# Patient Record
Sex: MALE | Race: BLACK OR AFRICAN AMERICAN | NOT HISPANIC OR LATINO | Employment: UNEMPLOYED | URBAN - METROPOLITAN AREA
[De-identification: names, ages, dates, MRNs, and addresses within clinical notes are randomized per-mention and may not be internally consistent; named-entity substitution may affect disease eponyms.]

---

## 2019-03-31 ENCOUNTER — NURSE TRIAGE (OUTPATIENT)
Dept: ADMINISTRATIVE | Facility: CLINIC | Age: 54
End: 2019-03-31

## 2019-03-31 NOTE — TELEPHONE ENCOUNTER
"    Reason for Disposition   [1] Vomiting AND [2] contains red blood or black ("coffee ground") material  (Exception: few red streaks in vomit that only happened once)    Protocols used: ST VOMITING-A-      "

## 2019-05-16 ENCOUNTER — OFFICE VISIT (OUTPATIENT)
Dept: FAMILY MEDICINE | Facility: HOSPITAL | Age: 54
End: 2019-05-16
Attending: FAMILY MEDICINE

## 2019-05-16 VITALS
DIASTOLIC BLOOD PRESSURE: 89 MMHG | WEIGHT: 198 LBS | HEIGHT: 71 IN | BODY MASS INDEX: 27.72 KG/M2 | HEART RATE: 81 BPM | SYSTOLIC BLOOD PRESSURE: 142 MMHG

## 2019-05-16 DIAGNOSIS — R11.12 PROJECTILE VOMITING WITH NAUSEA: Primary | ICD-10-CM

## 2019-05-16 DIAGNOSIS — R31.9 HEMATURIA, UNSPECIFIED TYPE: ICD-10-CM

## 2019-05-16 PROCEDURE — 99214 OFFICE O/P EST MOD 30 MIN: CPT | Performed by: STUDENT IN AN ORGANIZED HEALTH CARE EDUCATION/TRAINING PROGRAM

## 2019-05-16 RX ORDER — ONDANSETRON 4 MG/1
8 TABLET, FILM COATED ORAL EVERY 6 HOURS PRN
Qty: 40 TABLET | Refills: 0 | Status: SHIPPED | OUTPATIENT
Start: 2019-05-16 | End: 2019-05-26

## 2019-05-16 RX ORDER — PANTOPRAZOLE SODIUM 40 MG/1
TABLET, DELAYED RELEASE ORAL
Refills: 5 | COMMUNITY
Start: 2019-04-25 | End: 2022-06-06

## 2019-05-16 RX ORDER — LOSARTAN POTASSIUM AND HYDROCHLOROTHIAZIDE 25; 100 MG/1; MG/1
TABLET ORAL
Refills: 5 | Status: ON HOLD | COMMUNITY
Start: 2019-04-25 | End: 2022-12-05

## 2019-05-16 RX ORDER — GABAPENTIN 300 MG/1
300 CAPSULE ORAL 3 TIMES DAILY
Refills: 5 | COMMUNITY
Start: 2019-05-09 | End: 2023-02-01 | Stop reason: DRUGHIGH

## 2019-05-16 RX ORDER — AMLODIPINE BESYLATE 10 MG/1
TABLET ORAL
Refills: 5 | COMMUNITY
Start: 2019-04-25 | End: 2019-05-16

## 2019-05-16 NOTE — PROGRESS NOTES
"Subjective:       Patient ID: Mauro Benitez is a 53 y.o. male.    Chief Complaint: Establish Care    HPI   Mr. Barclay is a 54 yo man with a history of diabetes, hypertension, vertigo, and prostate cancer s/p total prostatectomy in 2015. He says for a few years now, he has had nausea, vomiting, and abdominal pain. He usually has about 2 episodes per year. However, this year has been worsening in frequency with at least 4 ER visits, not include visits to OSH. He denies any triggers for his nausea and vomiting; they occur whether or not he has eaten. He describes the emesis as foamy and brown, maybe dark red, not bright red or green. He describes the abdominal pain as cramping, squeezing pain in the right and left upper abdomen, "between his ribs." He has been on Protonix for over a month and reports that it helps significantly. However, he reports diarrhea since starting Protonix. Before starting Protonix, he was constipated. However, zofran does not help with his nausea. On rare occasions, he had blood in his urine and stool since his prostate surgery. He has decreased appetite with the nausea. His weight fluctuates up and down. He denies chest pain or shortness of breath. He reports back pain due to herniated discs in his lower back, confirmed by MRI in [place]. He had a colonoscopy as part of his prostate cancer workup, which he reports was negative. He has a past surgical history of prostate removal. 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       Review of Systems   Constitutional: Positive for appetite change. Negative for unexpected weight change.   HENT: Negative for congestion, rhinorrhea and trouble swallowing.    Eyes: Negative for pain and visual disturbance.   Respiratory: Negative for shortness of breath and wheezing.    Cardiovascular: Negative for chest pain and leg swelling.   Gastrointestinal: Positive for abdominal pain, blood in stool, diarrhea, nausea and vomiting. "   Genitourinary: Positive for hematuria. Negative for decreased urine volume and dysuria.   Musculoskeletal: Positive for back pain.   Neurological: Positive for light-headedness. Negative for syncope and weakness.       Objective:      Vitals:    05/16/19 0847   BP: (!) 142/89   Pulse: 81     Physical Exam   Constitutional: He is oriented to person, place, and time. He appears well-developed and well-nourished. No distress.   HENT:   Head: Normocephalic and atraumatic.   Eyes: EOM are normal.   Neck: Normal range of motion. Neck supple.   Cardiovascular: Normal rate, regular rhythm and normal heart sounds.   Pulmonary/Chest: Effort normal and breath sounds normal. No respiratory distress. He has no wheezes.   Abdominal: Soft. Bowel sounds are normal. He exhibits no distension and no mass. There is no tenderness.   Musculoskeletal: Normal range of motion.   Neurological: He is alert and oriented to person, place, and time.   Skin: Skin is warm and dry.   Psychiatric: He has a normal mood and affect.   Vitals reviewed.      Assessment:       1. Projectile vomiting with nausea    2. Hematuria, unspecified type        Plan:       Projectile vomiting with nausea  -     Hemoglobin A1c; Future; Expected date: 05/16/2019  -     Lipid panel; Future; Expected date: 05/16/2019  -     ondansetron (ZOFRAN-ODT) 4 MG TbDL; Take 2 tablets (8 mg total) by mouth every 6 (six) hours as needed.  Dispense: 40 tablet; Refill: 0  -     H. pylori antigen, stool; Future; Expected date: 05/16/2019    Hematuria, unspecified type  -     Urinalysis      Medical student note reviewed and edited

## 2019-05-16 NOTE — PROGRESS NOTES
I assume primary medical responsibility for this patient. I have reviewed the history, physical, and assessement & treatment plan with the resident and agree that the care is reasonable and necessary. This service has been performed by a resident without the presence of a teaching physician under the primary care exception. If necessary, an addendum of additional findings or evaluation beyond the resident documentation will be noted below.    Unclear source of N/V/Abd pain, possibly GERD vs. Functional dyspepsia vs. IBS, recent imaging wnl. Will work up in step-wise fashion & consider referral if no source/resolution. Also with occational hematuria since prostate surgery, will send UA, also send PSA w/ next labs     Sherrie Arora MD

## 2019-05-16 NOTE — PROGRESS NOTES
"Subjective:       Patient ID: Mauro Benitez is a 53 y.o. male.    Chief Complaint: Establish Care    Mr. Barclay is a 54 yo man with a history of diabetes, hypertension, vertigo, and prostate cancer s/p total prostatectomy in 2015. He says for a few years now, he has had nausea, vomiting, and abdominal pain. He usually has about 2 episodes per year. However, this year has been worsening in frequency with at least 4 ER visits, not include visits to OSH. He denies any triggers for his nausea and vomiting; they occur whether or not he has eaten. He describes the emesis as foamy and brown, maybe dark red, not bright red or green. He describes the abdominal pain as cramping, squeezing pain in the right and left upper abdomen, "between his ribs." He has been on Protonix for over a month and reports that it helps significantly. However, he reports diarrhea since starting Protonix. Before starting Protonix, he was constipated. However, zofran does not help with his nausea. On rare occasions, he had blood in his urine and stool since his prostate surgery. He has decreased appetite with the nausea. His weight fluctuates up and down. He denies chest pain or shortness of breath. He reports back pain due to herniated discs in his lower back, confirmed by MRI in [place]. He had a colonoscopy as part of his prostate cancer workup, which he reports was negative. He has a past surgical history of prostate removal. 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             Past Medical History:  Past Medical History:   Diagnosis Date    Diabetes mellitus     GERD (gastroesophageal reflux disease)     Hypertension      There is no problem list on file for this patient.      Past Surgical History:  History reviewed. No pertinent surgical history.    Allergies:  Review of patient's allergies indicates:   Allergen Reactions    Lisinopril Shortness Of Breath       Medications:  Prior to Admission medications  "   Medication Sig Start Date End Date Taking? Authorizing Provider   amLODIPine (NORVASC) 5 MG tablet Take 5 mg by mouth once daily.   Yes Historical Provider, MD   atorvastatin (LIPITOR) 20 MG tablet Take 20 mg by mouth once daily.   Yes Historical Provider, MD   carvedilol (COREG) 12.5 MG tablet Take 12.5 mg by mouth 2 (two) times daily with meals.   Yes Historical Provider, MD   metFORMIN (GLUCOPHAGE) 1000 MG tablet Take 1,000 mg by mouth 2 (two) times daily with meals.   Yes Historical Provider, MD   pantoprazole (PROTONIX) 40 MG tablet TAKE 1 TABLET(S) EVERY DAY BY ORAL ROUTE AS DIRECTED FOR 30 DAYS. 4/25/19  Yes Historical Provider, MD   gabapentin (NEURONTIN) 300 MG capsule Take 300 mg by mouth 3 (three) times daily. 5/9/19   Historical Provider, MD   losartan-hydrochlorothiazide 100-25 mg (HYZAAR) 100-25 mg per tablet TAKE 1 TABLET(S) EVERY DAY BY ORAL ROUTE IN THE MORNING. 4/25/19   Historical Provider, MD   promethazine (PHENERGAN) 12.5 MG Supp Place 1 suppository (12.5 mg total) rectally every 6 (six) hours as needed for Nausea. 4/7/19   ALIYAH Jaime   ranitidine (ZANTAC) 150 MG capsule Take 1 capsule (150 mg total) by mouth once daily. 3/31/19 3/30/20  Lois Nunez NP   amLODIPine (NORVASC) 10 MG tablet TAKE 1 TABLET(S) EVERY DAY BY ORAL ROUTE AT BEDTIME. 4/25/19 5/16/19  Historical Provider, MD   pantoprazole (PROTONIX) 20 MG tablet Take 1 tablet (20 mg total) by mouth once daily. 3/31/19 5/16/19  Josse Baer MD       Family History:  History reviewed. No pertinent family history.    Social History:  Social History     Tobacco Use    Smoking status: Current Every Day Smoker    Smokeless tobacco: Never Used   Substance Use Topics    Alcohol use: Never     Frequency: Never    Drug use: Yes     Types: Marijuana       Health Maintenance:   Patient's PCP is: Family Medicine   Immunizations:   Currently on File with [facility]:   There is no immunization history on file for this  patient.  TDap is up to date, Influenza is up to date  Cancer Screening:  Colonoscopy: is up to date. 2014  Review of Systems    Review of Systems   Constitutional: Positive for appetite change. Negative for unexpected weight change.   HENT: Negative for congestion, rhinorrhea and trouble swallowing.    Eyes: Negative for pain and visual disturbance.   Respiratory: Negative for shortness of breath and wheezing.    Cardiovascular: Negative for chest pain and leg swelling.   Gastrointestinal: Positive for abdominal pain, blood in stool, diarrhea, nausea and vomiting.   Genitourinary: Positive for hematuria. Negative for decreased urine volume and dysuria.   Musculoskeletal: Positive for back pain.   Neurological: Positive for light-headedness. Negative for syncope and weakness.       Objective:      Vitals:    05/16/19 0847   BP: (!) 142/89   Pulse: 81     Physical Exam    Physical Exam   Constitutional: He is oriented to person, place, and time. He appears well-developed and well-nourished. No distress.   HENT:   Head: Normocephalic and atraumatic.   Eyes: EOM are normal.   Neck: Normal range of motion. Neck supple.   Cardiovascular: Normal rate, regular rhythm and normal heart sounds.   Pulmonary/Chest: Effort normal and breath sounds normal. No respiratory distress. He has no wheezes.   Abdominal: Soft. Bowel sounds are normal. He exhibits no distension and no mass. There is no tenderness.   Musculoskeletal: Normal range of motion.   Neurological: He is alert and oriented to person, place, and time.   Skin: Skin is warm and dry.   Psychiatric: He has a normal mood and affect.   Vitals reviewed.      Assessment:       1. Projectile vomiting with nausea    2. Hematuria, unspecified type        Plan:       Projectile vomiting with nausea  -     Hemoglobin A1c; Future; Expected date: 05/16/2019  -     Lipid panel; Future; Expected date: 05/16/2019  -     ondansetron (ZOFRAN) 4 MG tablet; Take 2 tablets (8 mg total) by  mouth every 6 (six) hours as needed.  Dispense: 40 tablet; Refill: 0  -     H. pylori antigen, stool; Future; Expected date: 05/16/2019  - Counseled patient to stop smoking THC       Hematuria, unspecified type  -     Urinalysis      Follow up in about 1 month (around 6/13/2019).        Medical student note reviewed and edited   Carolina Barton MD   [employer]  PGY 1

## 2020-10-24 PROBLEM — E87.5 HYPERKALEMIA: Status: ACTIVE | Noted: 2020-10-24

## 2020-10-24 PROBLEM — E11.42 TYPE 2 DIABETES MELLITUS WITH DIABETIC POLYNEUROPATHY, WITHOUT LONG-TERM CURRENT USE OF INSULIN: Status: ACTIVE | Noted: 2020-10-24

## 2020-10-24 PROBLEM — N17.9 AKI (ACUTE KIDNEY INJURY): Status: ACTIVE | Noted: 2020-10-24

## 2020-10-24 PROBLEM — R07.9 CHEST PAIN: Status: ACTIVE | Noted: 2020-10-24

## 2020-10-24 PROBLEM — N28.9 RENAL INSUFFICIENCY: Status: ACTIVE | Noted: 2020-10-24

## 2020-10-25 PROBLEM — Z72.0 TOBACCO ABUSE: Status: ACTIVE | Noted: 2020-10-25

## 2020-10-25 PROBLEM — E87.5 HYPERKALEMIA: Status: RESOLVED | Noted: 2020-10-24 | Resolved: 2020-10-25

## 2020-10-25 PROBLEM — E78.2 MIXED HYPERLIPIDEMIA: Status: ACTIVE | Noted: 2020-10-25

## 2021-01-25 PROBLEM — F12.10 CANNABIS ABUSE: Status: ACTIVE | Noted: 2021-01-25

## 2021-01-25 PROBLEM — R11.2 NAUSEA AND VOMITING IN ADULT: Status: ACTIVE | Noted: 2021-01-25

## 2021-01-26 PROBLEM — R74.8 ELEVATED LIPASE: Status: ACTIVE | Noted: 2021-01-26

## 2021-08-06 ENCOUNTER — TELEPHONE (OUTPATIENT)
Dept: UROLOGY | Facility: CLINIC | Age: 56
End: 2021-08-06

## 2021-09-14 PROBLEM — U07.1 DIARRHEA DUE TO COVID-19: Status: ACTIVE | Noted: 2021-09-14

## 2021-09-14 PROBLEM — N17.9 ACUTE KIDNEY INJURY: Status: ACTIVE | Noted: 2021-09-14

## 2021-09-14 PROBLEM — A08.39 DIARRHEA DUE TO COVID-19: Status: ACTIVE | Noted: 2021-09-14

## 2021-09-16 DIAGNOSIS — U07.1 COVID-19 VIRUS DETECTED: ICD-10-CM

## 2021-09-16 PROBLEM — N17.9 ACUTE KIDNEY INJURY: Status: RESOLVED | Noted: 2021-09-14 | Resolved: 2021-09-16

## 2021-09-17 ENCOUNTER — INFUSION (OUTPATIENT)
Dept: INFECTIOUS DISEASES | Facility: HOSPITAL | Age: 56
End: 2021-09-17
Attending: NURSE PRACTITIONER
Payer: MEDICAID

## 2021-09-17 VITALS
DIASTOLIC BLOOD PRESSURE: 81 MMHG | OXYGEN SATURATION: 95 % | RESPIRATION RATE: 18 BRPM | WEIGHT: 190 LBS | TEMPERATURE: 99 F | HEIGHT: 71 IN | BODY MASS INDEX: 26.6 KG/M2 | SYSTOLIC BLOOD PRESSURE: 131 MMHG | HEART RATE: 79 BPM

## 2021-09-17 DIAGNOSIS — U07.1 COVID-19: Primary | ICD-10-CM

## 2021-09-17 PROCEDURE — 25000003 PHARM REV CODE 250: Performed by: INTERNAL MEDICINE

## 2021-09-17 PROCEDURE — 63600175 PHARM REV CODE 636 W HCPCS: Performed by: INTERNAL MEDICINE

## 2021-09-17 PROCEDURE — M0243 CASIRIVI AND IMDEVI INFUSION: HCPCS | Performed by: INTERNAL MEDICINE

## 2021-09-17 RX ORDER — EPINEPHRINE 0.3 MG/.3ML
0.3 INJECTION SUBCUTANEOUS
Status: DISCONTINUED | OUTPATIENT
Start: 2021-09-17 | End: 2022-06-06

## 2021-09-17 RX ORDER — ACETAMINOPHEN 325 MG/1
650 TABLET ORAL ONCE AS NEEDED
Status: DISCONTINUED | OUTPATIENT
Start: 2021-09-17 | End: 2022-07-18

## 2021-09-17 RX ORDER — ONDANSETRON 4 MG/1
4 TABLET, ORALLY DISINTEGRATING ORAL ONCE AS NEEDED
Status: DISCONTINUED | OUTPATIENT
Start: 2021-09-17 | End: 2022-07-18

## 2021-09-17 RX ORDER — ALBUTEROL SULFATE 90 UG/1
2 AEROSOL, METERED RESPIRATORY (INHALATION)
Status: DISCONTINUED | OUTPATIENT
Start: 2021-09-17 | End: 2022-07-18

## 2021-09-17 RX ORDER — DIPHENHYDRAMINE HYDROCHLORIDE 50 MG/ML
25 INJECTION INTRAMUSCULAR; INTRAVENOUS ONCE AS NEEDED
Status: DISCONTINUED | OUTPATIENT
Start: 2021-09-17 | End: 2022-06-06

## 2021-09-17 RX ORDER — SODIUM CHLORIDE 0.9 % (FLUSH) 0.9 %
10 SYRINGE (ML) INJECTION
Status: DISCONTINUED | OUTPATIENT
Start: 2021-09-17 | End: 2022-07-18

## 2021-09-17 RX ADMIN — CASIRIVIMAB AND IMDEVIMAB 600 MG: 600; 600 INJECTION, SOLUTION, CONCENTRATE INTRAVENOUS at 02:09

## 2021-09-19 ENCOUNTER — NURSE TRIAGE (OUTPATIENT)
Dept: ADMINISTRATIVE | Facility: CLINIC | Age: 56
End: 2021-09-19

## 2021-09-21 ENCOUNTER — TELEPHONE (OUTPATIENT)
Dept: UROLOGY | Facility: CLINIC | Age: 56
End: 2021-09-21

## 2021-09-28 ENCOUNTER — NURSE TRIAGE (OUTPATIENT)
Dept: ADMINISTRATIVE | Facility: CLINIC | Age: 56
End: 2021-09-28

## 2022-01-03 ENCOUNTER — TELEPHONE (OUTPATIENT)
Dept: RADIATION ONCOLOGY | Facility: CLINIC | Age: 57
End: 2022-01-03
Payer: MEDICAID

## 2022-01-03 NOTE — TELEPHONE ENCOUNTER
Attempted to reach patient regarding radiation oncology referral. Consultation completed 6 months ago with [facility] with previous radiation in another [place]. Records need to be obtained prior to processing referral for further opionion. Referring provider informed by oncology .

## 2022-01-04 ENCOUNTER — TELEPHONE (OUTPATIENT)
Dept: RADIATION ONCOLOGY | Facility: CLINIC | Age: 57
End: 2022-01-04
Payer: MEDICAID

## 2022-01-04 NOTE — TELEPHONE ENCOUNTER
Nurse navigator attempted to contact patient for the second time regarding radiation oncology referral. Voicemail left with contact information for return call. Further clarification necessary. Patient was evaluated by radiation oncology at [facility]- found in care everywhere. Patient also has not established with urology for surveillance. Appointments canceled and not rescheduled. Will attempt to contact urology for assistance rescheduling.  Contacted Mary Beth from Dr. Roberts's office for clarification on referral. Call back number provided. No previous radiation records available. Last PSA was from early 2021. Requested more information for appropriate scheduling.

## 2022-06-06 ENCOUNTER — OFFICE VISIT (OUTPATIENT)
Dept: UROLOGY | Facility: CLINIC | Age: 57
End: 2022-06-06
Payer: MEDICAID

## 2022-06-06 VITALS — WEIGHT: 201.63 LBS | BODY MASS INDEX: 28.23 KG/M2 | HEIGHT: 71 IN

## 2022-06-06 DIAGNOSIS — Z85.46 PERSONAL HISTORY OF MALIGNANT NEOPLASM OF PROSTATE: Primary | ICD-10-CM

## 2022-06-06 DIAGNOSIS — Z80.42 FAMILY HISTORY OF PROSTATE CANCER IN FATHER: ICD-10-CM

## 2022-06-06 LAB
BILIRUB UR QL STRIP: NEGATIVE
CLARITY UR REFRACT.AUTO: CLEAR
COLOR UR AUTO: ABNORMAL
GLUCOSE UR QL STRIP: ABNORMAL
HGB UR QL STRIP: NEGATIVE
KETONES UR QL STRIP: NEGATIVE
LEUKOCYTE ESTERASE UR QL STRIP: NEGATIVE
NITRITE UR QL STRIP: NEGATIVE
PH UR STRIP: 7 [PH] (ref 5–8)
PROT UR QL STRIP: NEGATIVE
SP GR UR STRIP: 1.01 (ref 1–1.03)
URN SPEC COLLECT METH UR: ABNORMAL

## 2022-06-06 PROCEDURE — 4010F ACE/ARB THERAPY RXD/TAKEN: CPT | Mod: CPTII,,, | Performed by: NURSE PRACTITIONER

## 2022-06-06 PROCEDURE — 1159F MED LIST DOCD IN RCRD: CPT | Mod: CPTII,,, | Performed by: NURSE PRACTITIONER

## 2022-06-06 PROCEDURE — 1160F PR REVIEW ALL MEDS BY PRESCRIBER/CLIN PHARMACIST DOCUMENTED: ICD-10-PCS | Mod: CPTII,,, | Performed by: NURSE PRACTITIONER

## 2022-06-06 PROCEDURE — 4010F PR ACE/ARB THEARPY RXD/TAKEN: ICD-10-PCS | Mod: CPTII,,, | Performed by: NURSE PRACTITIONER

## 2022-06-06 PROCEDURE — 99215 OFFICE O/P EST HI 40 MIN: CPT | Mod: PBBFAC,PO | Performed by: NURSE PRACTITIONER

## 2022-06-06 PROCEDURE — 99999 PR PBB SHADOW E&M-EST. PATIENT-LVL V: CPT | Mod: PBBFAC,,, | Performed by: NURSE PRACTITIONER

## 2022-06-06 PROCEDURE — 81003 URINALYSIS AUTO W/O SCOPE: CPT | Performed by: NURSE PRACTITIONER

## 2022-06-06 PROCEDURE — 1159F PR MEDICATION LIST DOCUMENTED IN MEDICAL RECORD: ICD-10-PCS | Mod: CPTII,,, | Performed by: NURSE PRACTITIONER

## 2022-06-06 PROCEDURE — 87086 URINE CULTURE/COLONY COUNT: CPT | Performed by: NURSE PRACTITIONER

## 2022-06-06 PROCEDURE — 3008F BODY MASS INDEX DOCD: CPT | Mod: CPTII,,, | Performed by: NURSE PRACTITIONER

## 2022-06-06 PROCEDURE — 3008F PR BODY MASS INDEX (BMI) DOCUMENTED: ICD-10-PCS | Mod: CPTII,,, | Performed by: NURSE PRACTITIONER

## 2022-06-06 PROCEDURE — 99999 PR PBB SHADOW E&M-EST. PATIENT-LVL V: ICD-10-PCS | Mod: PBBFAC,,, | Performed by: NURSE PRACTITIONER

## 2022-06-06 PROCEDURE — 1160F RVW MEDS BY RX/DR IN RCRD: CPT | Mod: CPTII,,, | Performed by: NURSE PRACTITIONER

## 2022-06-06 PROCEDURE — 99204 OFFICE O/P NEW MOD 45 MIN: CPT | Mod: S$PBB,,, | Performed by: NURSE PRACTITIONER

## 2022-06-06 PROCEDURE — 99204 PR OFFICE/OUTPT VISIT, NEW, LEVL IV, 45-59 MIN: ICD-10-PCS | Mod: S$PBB,,, | Performed by: NURSE PRACTITIONER

## 2022-06-06 RX ORDER — HYDROCHLOROTHIAZIDE 25 MG/1
25 TABLET ORAL DAILY
COMMUNITY
Start: 2022-01-21 | End: 2022-07-18

## 2022-06-06 RX ORDER — LORATADINE 10 MG/1
TABLET ORAL
COMMUNITY
Start: 2021-12-22 | End: 2024-01-29

## 2022-06-06 RX ORDER — LOSARTAN POTASSIUM 100 MG/1
100 TABLET ORAL DAILY
COMMUNITY
Start: 2022-01-21

## 2022-06-06 RX ORDER — PROMETHAZINE HYDROCHLORIDE 25 MG/1
TABLET ORAL
COMMUNITY
End: 2022-06-06

## 2022-06-06 RX ORDER — CYCLOBENZAPRINE HCL 5 MG
TABLET ORAL
COMMUNITY
End: 2023-02-10

## 2022-06-06 RX ORDER — OXYBUTYNIN CHLORIDE 10 MG/1
TABLET, EXTENDED RELEASE ORAL
COMMUNITY
Start: 2021-12-22 | End: 2023-03-29

## 2022-06-06 RX ORDER — AMITRIPTYLINE HYDROCHLORIDE 50 MG/1
TABLET, FILM COATED ORAL
COMMUNITY
End: 2022-06-06

## 2022-06-06 RX ORDER — ERGOCALCIFEROL 1.25 MG/1
CAPSULE ORAL
COMMUNITY
End: 2022-11-30

## 2022-06-06 RX ORDER — CYPROHEPTADINE HYDROCHLORIDE 4 MG/1
TABLET ORAL
COMMUNITY
Start: 2022-05-26 | End: 2023-06-09 | Stop reason: ALTCHOICE

## 2022-06-06 RX ORDER — INDOMETHACIN 25 MG/1
CAPSULE ORAL
COMMUNITY
End: 2022-11-30

## 2022-06-06 RX ORDER — OMEPRAZOLE 20 MG/1
CAPSULE, DELAYED RELEASE ORAL
COMMUNITY
Start: 2021-09-21 | End: 2024-01-29

## 2022-06-06 RX ORDER — FLUTICASONE PROPIONATE 50 MCG
SPRAY, SUSPENSION (ML) NASAL
COMMUNITY

## 2022-06-06 RX ORDER — ASPIRIN 81 MG/1
TABLET ORAL
COMMUNITY

## 2022-06-06 NOTE — PROGRESS NOTES
Subjective:       Patient ID: Mauro Benitez is a 56 y.o. male.    Chief Complaint: Elevated PSA     Patient is new to me. He is a 55 yo AAM who is here today for evaluation of elevated PSA. He has a hx of prostate cancer with prostatectomy in 2015 in [place] ([facility]) as reported by patient. He reports going to [facility] for evaluation, but was told there's nothing else they can do for him.   Family hx of prostate cancer (father).  Been in [place] since 2019.   Originally from [place].   Here today with his wife (Gretel).   Taking oxybutynin 10 mg BID since being in [place] for management of his urinary symptoms.   Other  This is a chronic (elevated PSA) problem. The current episode started more than 1 month ago. The problem occurs daily. Associated symptoms include urinary symptoms and vertigo. Pertinent negatives include no abdominal pain, anorexia, change in bowel habit, chills, fatigue, fever, headaches, nausea, swollen glands, vomiting or weakness. Nothing aggravates the symptoms. He has tried nothing for the symptoms.     Review of Systems   Constitutional: Negative for appetite change, chills, fatigue and fever.   Gastrointestinal: Negative for abdominal pain, anorexia, change in bowel habit, constipation, diarrhea, nausea, vomiting and change in bowel habit.   Genitourinary: Positive for enuresis and urgency (with leakage sometimes). Negative for decreased urine volume, difficulty urinating, discharge, dysuria, flank pain, frequency, hematuria, penile pain, penile swelling, scrotal swelling and testicular pain.        Nocturia x1-2   Neurological: Positive for vertigo. Negative for dizziness, weakness and headaches.   Psychiatric/Behavioral: Negative.          Objective:      Physical Exam  Vitals and nursing note reviewed.   Constitutional:       General: He is not in acute distress.     Appearance: He is well-developed. He is not ill-appearing.   HENT:      Head: Normocephalic and atraumatic.   Eyes:      Pupils:  Pupils are equal, round, and reactive to light.   Cardiovascular:      Rate and Rhythm: Normal rate.   Pulmonary:      Effort: Pulmonary effort is normal. No respiratory distress.   Abdominal:      Palpations: Abdomen is soft.      Tenderness: There is no abdominal tenderness.   Musculoskeletal:         General: Normal range of motion.      Cervical back: Normal range of motion.   Skin:     General: Skin is warm and dry.   Neurological:      Mental Status: He is alert and oriented to person, place, and time.      Coordination: Coordination normal.   Psychiatric:         Mood and Affect: Mood normal.         Behavior: Behavior normal.         Thought Content: Thought content normal.         Judgment: Judgment normal.         Assessment:       Problem List Items Addressed This Visit    None     Visit Diagnoses     Personal history of malignant neoplasm of prostate    -  Primary    Relevant Orders    PSA, Total and Free    Urine culture    Urinalysis    Family history of prostate cancer in father        Relevant Orders    PSA, Total and Free    Urine culture    Urinalysis          Plan:       Mauro was seen today for elevated psa .    Diagnoses and all orders for this visit:    Personal history of malignant neoplasm of prostate  -     PSA, Total and Free; Future  -     Urine culture  -     Urinalysis    Family history of prostate cancer in father  -     PSA, Total and Free; Future  -     Urine culture  -     Urinalysis    NOTE: If urine cx is normal change OAB medication. If PSA is elevated order Pylarify F-18 test for suspected reoccurrence to further evaluate.     Janusz Quiroz NP

## 2022-06-07 LAB — BACTERIA UR CULT: NORMAL

## 2022-06-09 DIAGNOSIS — Z85.46 PERSONAL HISTORY OF MALIGNANT NEOPLASM OF PROSTATE: Primary | ICD-10-CM

## 2022-06-09 DIAGNOSIS — R97.20 ELEVATED PSA, LESS THAN 10 NG/ML: ICD-10-CM

## 2022-06-10 ENCOUNTER — TELEPHONE (OUTPATIENT)
Dept: UROLOGY | Facility: CLINIC | Age: 57
End: 2022-06-10
Payer: MEDICAID

## 2022-06-10 NOTE — TELEPHONE ENCOUNTER
Pt has been informed and scheduled.    ----- Message from Janusz Quiroz NP sent at 6/9/2022  4:07 PM CDT -----  Please inform patient his PSA is elevated at 2.8 ng/mL (personal hx of prostate ca wit prostatectomy). Patient needs a Pylarify F-18 scan at [facility] scheduled. Order placed. Thanks.

## 2022-06-21 ENCOUNTER — HOSPITAL ENCOUNTER (OUTPATIENT)
Dept: RADIOLOGY | Facility: HOSPITAL | Age: 57
Discharge: HOME | End: 2022-06-21
Attending: NURSE PRACTITIONER
Payer: MEDICAID

## 2022-06-21 DIAGNOSIS — Z85.46 PERSONAL HISTORY OF MALIGNANT NEOPLASM OF PROSTATE: ICD-10-CM

## 2022-06-21 DIAGNOSIS — R97.20 ELEVATED PSA, LESS THAN 10 NG/ML: ICD-10-CM

## 2022-06-21 PROCEDURE — 78815 NM PET CT F 18 PYL PSMA, MIDTHIGH TO VERTEX: ICD-10-PCS | Mod: 26,PS,, | Performed by: RADIOLOGY

## 2022-06-21 PROCEDURE — 78815 PET IMAGE W/CT SKULL-THIGH: CPT | Mod: TC

## 2022-06-21 PROCEDURE — 78815 PET IMAGE W/CT SKULL-THIGH: CPT | Mod: 26,PS,, | Performed by: RADIOLOGY

## 2022-07-01 ENCOUNTER — TELEPHONE (OUTPATIENT)
Dept: UROLOGY | Facility: CLINIC | Age: 57
End: 2022-07-01
Payer: MEDICAID

## 2022-07-01 NOTE — TELEPHONE ENCOUNTER
Pt states he will come next week to sign release of info due to his work schedule. He also had radiation done [id number].      ----- Message from Mini Peña LPN sent at 6/30/2022  4:55 PM CDT -----  Regarding: result note    ----- Message -----  From: Janusz Quiroz NP  Sent: 6/29/2022   3:32 PM CDT  To: Mini Peña LPN    Please inform patient his PET CT showed that some lymph nodes showed concern for recurrent metastatic disease from his prostate cancer. Scan also reviewed with Dr. Edmond. Can you have patient come to clinic to sign a release of inform so we can get his medical and surgical hx from [facility] in [place]. Patient reports prostatectomy in 2015. Please find out if patient had radiation in the past for his prostate cancer. I need to know this before I place referral to Radiation Oncologist.

## 2022-07-06 ENCOUNTER — TELEPHONE (OUTPATIENT)
Dept: UROLOGY | Facility: CLINIC | Age: 57
End: 2022-07-06
Payer: MEDICAID

## 2022-07-06 NOTE — TELEPHONE ENCOUNTER
----- Message from Zari Francis sent at 7/6/2022  8:39 AM CDT -----  Regarding: updated info  Type:  Patient Returning Call    Who Called:patient family     Who Left Message for Patient:n/a    Does the patient know what this is regarding?:updated information     Address: [address] [place] [address]  Number: [phone]    Would the patient rather a call back or a response via [facility]? Call back if needed     Best Call Back Number:[phone]  Additional Information: n/a

## 2022-07-07 ENCOUNTER — TELEPHONE (OUTPATIENT)
Dept: ORTHOPEDICS | Facility: CLINIC | Age: 57
End: 2022-07-07
Payer: MEDICAID

## 2022-07-07 DIAGNOSIS — M25.531 RIGHT WRIST PAIN: Primary | ICD-10-CM

## 2022-07-07 NOTE — TELEPHONE ENCOUNTER
LVM that he has a new patient appointment with Elizabeth Gutierrez PA-C on 07/18/22 @ 1:00pm, but Xray's are needed prior to that visit.  I have scheduled the xray's on 07/14/22 @ 10:00 am.  I will mail out both appointment reminders to the address listed in chart.           ----- Message from Elizabeth uGtierrez PA-C sent at 7/7/2022  8:30 AM CDT -----  He has appt with me on Monday 7/18. He needs right wrist XRs prior to seeing me. They are ordered, please let him know to get them done prior to seeing me.     Thanks.

## 2022-07-07 NOTE — PROGRESS NOTES
Subjective:      Patient ID: Mauro Benitez is a 56 y.o. male.    Chief Complaint: Pain of the Right Wrist      HPI  (Jordanian)    He notes mass on right wrist x years that varies in size. He has intermittent pain in right wrist/thumb that is worse with using his hands. He is left hand dominant. He rates his pain as a 0 on a scale of 1-10. He's had no pain in last 2-3 weeks. No numbness or tingling. Pain is aching like a tooth ache. Pain is better with rest. 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     He is taking indocin. No OT, injections, or surgery on his wrist. Had some relief with OTC brace.     History of HTN, DM, and history of prostate CA with recent recurrence. Has f/u with Feli in August to discuss starting total androgen blockade.     PCP: Armando  Urology: Janusz Quiroz NP and Feli BECK- no problem with surgery from their standpoint per Janusz.     He is not vaccinated for covid. He had covid September 2021.       Past Medical History:   Diagnosis Date    Diabetes mellitus     GERD (gastroesophageal reflux disease)     Hypertension          Current Outpatient Medications:     amLODIPine (NORVASC) 5 MG tablet, Take 5 mg by mouth once daily., Disp: , Rfl:     aspirin (ECOTRIN) 81 MG EC tablet, Aspir-81 mg tablet,delayed release  Take 1 tablet every day by oral route in the morning for 30 days., Disp: , Rfl:     atorvastatin (LIPITOR) 20 MG tablet, Take 20 mg by mouth once daily., Disp: , Rfl:     carvedilol (COREG) 12.5 MG tablet, Take 12.5 mg by mouth 2 (two) times daily with meals., Disp: , Rfl:     cyclobenzaprine (FLEXERIL) 5 MG tablet, cyclobenzaprine 5 mg tablet  TAKE 1 TABLET BY MOUTH THREE TIMES DAILY FOR MUSCLE SPASMS FOR UP TO 10 DAYS, Disp: , Rfl:     cyproheptadine (PERIACTIN) 4 mg tablet, Take by mouth., Disp: , Rfl:     ergocalciferol (ERGOCALCIFEROL) 50,000 unit Cap, Vitamin D2 1,250 mcg (50,000 unit) capsule, Disp: , Rfl:     fluticasone propionate (FLONASE) 50 mcg/actuation  Telephone Encounter by Lizy Dubon at 3/2/2021 12:44 PM     Author: Lizy Dubon Service: -- Author Type: Financial Resource Guide    Filed: 3/2/2021 12:47 PM Encounter Date: 3/1/2021 Status: Signed    : Lizy Dubon (Financial Resource Guide)       PA Initiation  Medication: Praluent 75mg/ml  QTY: 2 pens for 28 days  Insurance Company: [employer]  Pharmacy Filing Rx: [email or web address] [id number]  Filling Pharmacy Phone:   Filling Pharmacy Fax: NA  Start Date: 3/2/21  Additional Information: plan requires Praluent. Faxed in with chart notes and labs                  nasal spray, fluticasone propionate 50 mcg/actuation nasal spray,suspension  SPRAY 2 SPRAYS EVERY DAY BY INTRANASAL ROUTE AS NEEDED., Disp: , Rfl:     gabapentin (NEURONTIN) 300 MG capsule, Take 300 mg by mouth 3 (three) times daily., Disp: , Rfl: 5    indomethacin (INDOCIN) 25 MG capsule, indomethacin 25 mg capsule  TAKE 1 CAPSULE BY MOUTH THREE TIMES A DAY WITH MEALS, Disp: , Rfl:     loratadine (CLARITIN) 10 mg tablet, loratadine 10 mg tablet, Disp: , Rfl:     losartan (COZAAR) 100 MG tablet, Take 100 mg by mouth once daily., Disp: , Rfl:     losartan-hydrochlorothiazide 100-25 mg (HYZAAR) 100-25 mg per tablet, TAKE 1 TABLET(S) EVERY DAY BY ORAL ROUTE IN THE MORNING., Disp: , Rfl: 5    metFORMIN (GLUCOPHAGE) 1000 MG tablet, Take 1,000 mg by mouth 2 (two) times daily with meals., Disp: , Rfl:     omeprazole (PRILOSEC) 20 MG capsule, omeprazole 20 mg capsule,delayed release  TAKE 1 TABLET BY MOUTH EVERY DAY, Disp: , Rfl:     oxybutynin (DITROPAN-XL) 10 MG 24 hr tablet, oxybutynin chloride ER 10 mg tablet,extended release 24 hr  TAKE 1 TABLET BY MOUTH DAILY, Disp: , Rfl:     sildenafiL (VIAGRA) 100 MG tablet, Take 100 mg by mouth daily as needed for Erectile Dysfunction., Disp: , Rfl:   No current facility-administered medications for this visit.    Review of patient's allergies indicates:   Allergen Reactions    Lisinopril Shortness Of Breath       Review of Systems   Constitutional: Negative for chills, fever, night sweats and weight gain.   Gastrointestinal: Negative for bowel incontinence, nausea and vomiting.   Genitourinary: Negative for bladder incontinence.   Neurological: Negative for disturbances in coordination and loss of balance.         Objective:        Wt 91.2 kg (201 lb 1.6 oz)   BMI 28.05 kg/m²     General    Vitals reviewed.  Constitutional: He is oriented to person, place, and time. He appears well-developed and well-nourished.   Pulmonary/Chest: Effort normal.   Abdominal: He exhibits  no distension.   Neurological: He is alert and oriented to person, place, and time.   Psychiatric: He has a normal mood and affect. His behavior is normal. Judgment and thought content normal.           Well developed, well nourished patient in no acute distress  Alert and oriented x 3  HEENT- Normal exam  Lungs- Clear to auscultation  Heart- Regular rate and rhythm  Abdomen- Soft nontender    RIGHT Hand/Wrist Examination:    Observation/Inspection:  Swelling  mass radial aspect of wrist consistent with ganglion  Deformity  none  Discoloration  none     Scars   none    Atrophy  none    HAND/WRIST EXAMINATION:  Finkelstein's Test   Neg  WHAT Test    Neg  Snuff box tenderness   Neg  Hook of Hamate Tenderness  Neg  CMC grind    Neg    Neurovascular Exam:  Digits WWP, brisk CR < 3s throughout  NVI motor/LTS to M/R/U nerves, radial pulse 2+  Tinel's Test - Carpal Tunnel  Neg  Tinel's Test - Cubital Tunnel  Neg  Phalen's Test    Neg  Median Nerve Compression Test Neg    ROM hand/wrist/elbow full, painless    He has tenderness over mass/ganglion. It is very close to radial artery.       LEFT Hand/Wrist Examination:    Observation/Inspection:  Swelling  none    Deformity  none  Discoloration  none     Scars   none    Atrophy  none    HAND/WRIST EXAMINATION:  Finkelstein's Test   Neg  WHAT Test    Neg  Snuff box tenderness   Neg  Hook of Hamate Tenderness  Neg  CMC grind    Neg    Neurovascular Exam:  Digits WWP, brisk CR < 3s throughout  NVI motor/LTS to M/R/U nerves, radial pulse 2+  Tinel's Test - Carpal Tunnel  Neg  Tinel's Test - Cubital Tunnel  Neg  Phalen's Test    Neg  Median Nerve Compression Test Neg    ROM hand/wrist/elbow full, painless      XRAY INTERPRETATION:   X-rays of right wrist dated 7/14/22 are personally reviewed and show no fracture or dislocation.         Assessment:       Encounter Diagnosis   Name Primary?    Ganglion cyst of volar aspect of right wrist           Plan:       Mauro was seen today  for pain.    Diagnoses and all orders for this visit:    Ganglion cyst of volar aspect of right wrist  -     Ambulatory referral/consult to Orthopedics      Mass on right wrist x years that varies in size. 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 No numbness or tingling.     XRs of right wrist are normal. Exam shows volar wrist ganglion near radial artery.     Treatment options reviewed with patient along with above right wrist xrays. Following plan made:     - I reviewed all options with the patient and the patient wants to proceed with surgery intervention, specifically removal of mass right wrist.   - I went over the procedure in detail, the risk and benefits of the procedure and all questions were answered. Informed consent was obtained and an H&P and consent was completed.  - He will need covid test preop.   - He is seeing urology for recurrence of prostate CA. Reviewed with Janusz Quiroz NP- okay for above surgery from her standpoint.   - Given gel wrist brace to use prn until surgery.   - Will review with Dr. Blankenship and his staff to call patient to schedule surgery if he agrees with above plan.    Follow up if symptoms worsen or fail to improve.

## 2022-07-11 ENCOUNTER — TELEPHONE (OUTPATIENT)
Dept: UROLOGY | Facility: CLINIC | Age: 57
End: 2022-07-11
Payer: MEDICAID

## 2022-07-11 NOTE — TELEPHONE ENCOUNTER
"----- Message from Janusz Quiroz NP sent at 7/11/2022 11:31 AM CDT -----  Regarding: appt; reoccurrence of prostate cancer  Please schedule patient an appt with Dr. Edmond for "reoccurrence of his prostate cancer and to get started on total androgen blockade". Please place that in appt visit note. Appt needed sooner than later. Thanks.  ----- Message -----  From: Edward Edmond MD  Sent: 7/8/2022  10:20 AM CDT  To: Janusz Quiroz NP    The patient will need to come in and get started on Total androgen blockade.  ----- Message -----  From: Janusz Quiroz NP  Sent: 7/7/2022   2:26 PM CDT  To: MD Luiz Cornejo Dr.. You reviewed this patient's PET CT last week for me and sent me recommendations about placing a referral to radiation oncologist if patient did not receive radiation in the past. I found out that patient received radiation in 2015 after his prostatectomy. Does that mean patient can no longer receive radiation therapy again?         "

## 2022-07-18 ENCOUNTER — OFFICE VISIT (OUTPATIENT)
Dept: ORTHOPEDICS | Facility: CLINIC | Age: 57
End: 2022-07-18
Payer: MEDICAID

## 2022-07-18 VITALS — BODY MASS INDEX: 28.05 KG/M2 | WEIGHT: 201.13 LBS

## 2022-07-18 DIAGNOSIS — M67.431 GANGLION CYST OF VOLAR ASPECT OF RIGHT WRIST: ICD-10-CM

## 2022-07-18 PROCEDURE — 99999 PR PBB SHADOW E&M-EST. PATIENT-LVL IV: CPT | Mod: PBBFAC,,, | Performed by: PHYSICIAN ASSISTANT

## 2022-07-18 PROCEDURE — 1160F RVW MEDS BY RX/DR IN RCRD: CPT | Mod: CPTII,,, | Performed by: PHYSICIAN ASSISTANT

## 2022-07-18 PROCEDURE — 99203 OFFICE O/P NEW LOW 30 MIN: CPT | Mod: S$PBB,,, | Performed by: PHYSICIAN ASSISTANT

## 2022-07-18 PROCEDURE — 1160F PR REVIEW ALL MEDS BY PRESCRIBER/CLIN PHARMACIST DOCUMENTED: ICD-10-PCS | Mod: CPTII,,, | Performed by: PHYSICIAN ASSISTANT

## 2022-07-18 PROCEDURE — 1159F PR MEDICATION LIST DOCUMENTED IN MEDICAL RECORD: ICD-10-PCS | Mod: CPTII,,, | Performed by: PHYSICIAN ASSISTANT

## 2022-07-18 PROCEDURE — 99203 PR OFFICE/OUTPT VISIT, NEW, LEVL III, 30-44 MIN: ICD-10-PCS | Mod: S$PBB,,, | Performed by: PHYSICIAN ASSISTANT

## 2022-07-18 PROCEDURE — 1159F MED LIST DOCD IN RCRD: CPT | Mod: CPTII,,, | Performed by: PHYSICIAN ASSISTANT

## 2022-07-18 PROCEDURE — 4010F PR ACE/ARB THEARPY RXD/TAKEN: ICD-10-PCS | Mod: CPTII,,, | Performed by: PHYSICIAN ASSISTANT

## 2022-07-18 PROCEDURE — 3008F BODY MASS INDEX DOCD: CPT | Mod: CPTII,,, | Performed by: PHYSICIAN ASSISTANT

## 2022-07-18 PROCEDURE — 4010F ACE/ARB THERAPY RXD/TAKEN: CPT | Mod: CPTII,,, | Performed by: PHYSICIAN ASSISTANT

## 2022-07-18 PROCEDURE — 99999 PR PBB SHADOW E&M-EST. PATIENT-LVL IV: ICD-10-PCS | Mod: PBBFAC,,, | Performed by: PHYSICIAN ASSISTANT

## 2022-07-18 PROCEDURE — 3008F PR BODY MASS INDEX (BMI) DOCUMENTED: ICD-10-PCS | Mod: CPTII,,, | Performed by: PHYSICIAN ASSISTANT

## 2022-07-18 PROCEDURE — 99214 OFFICE O/P EST MOD 30 MIN: CPT | Mod: PBBFAC,PN | Performed by: PHYSICIAN ASSISTANT

## 2022-07-19 ENCOUNTER — TELEPHONE (OUTPATIENT)
Dept: UROLOGY | Facility: CLINIC | Age: 57
End: 2022-07-19
Payer: MEDICAID

## 2022-07-19 NOTE — TELEPHONE ENCOUNTER
----- Message from Janusz Quiroz NP sent at 7/18/2022  3:00 PM CDT -----  Hey can you call patient when you get a chance and inform him that Dr. Edmond is in sx on Mondays. Thanks.   ----- Message -----  From: Elizabeth Gutierrez PA-C  Sent: 7/18/2022   1:35 PM CDT  To: Janusz Quiroz NP    Hi Janusz,     You have seen him for recurrent prostate CA and he has appt with Feli scheduled in August to discuss starting total androgen blockade.     We are planning on taking him to OR for [facility] procedure to remove a wrist ganglion. Is this okay from your standpoint?     Also, he needs to reschedule his appointment to a Monday if possible.     Thank you so much!    Elizabeth

## 2022-07-29 ENCOUNTER — TELEPHONE (OUTPATIENT)
Dept: ORTHOPEDICS | Facility: CLINIC | Age: 57
End: 2022-07-29
Payer: MEDICAID

## 2022-07-29 NOTE — TELEPHONE ENCOUNTER
----- Message from Sandra Juan MA sent at 7/25/2022  3:52 PM CDT -----    ----- Message -----  From: Salazar Blankenship Jr., MD  Sent: 7/24/2022   4:33 PM CDT  To: [facility] Staff    Please call patient to schedule surgery Thx  ----- Message -----  From: Elizabeth Gutierrez PA-C  Sent: 7/18/2022   3:53 PM CDT  To: Salazar Blankenship Jr., MD, #    Saw him today for right wrist volar ganglion.     Right wrist XRs normal.     He wants surgery to remove it.     He is seeing urology for recurrence of prostate CA- okay for hand surgery from their standpoint.     H&P done  Consent done  Will need covid test. Not vaccinated.     Please have staff call to schedule if you agree.     Thanks!

## 2022-09-16 ENCOUNTER — OFFICE VISIT (OUTPATIENT)
Dept: UROLOGY | Facility: CLINIC | Age: 57
End: 2022-09-16
Payer: MEDICAID

## 2022-09-16 VITALS
OXYGEN SATURATION: 96 % | SYSTOLIC BLOOD PRESSURE: 136 MMHG | HEIGHT: 71 IN | DIASTOLIC BLOOD PRESSURE: 84 MMHG | BODY MASS INDEX: 26.6 KG/M2 | WEIGHT: 190 LBS | HEART RATE: 84 BPM

## 2022-09-16 DIAGNOSIS — C61 PROSTATE CANCER METASTATIC TO INTRAPELVIC LYMPH NODE: Primary | ICD-10-CM

## 2022-09-16 DIAGNOSIS — R97.20 ELEVATED PSA, LESS THAN 10 NG/ML: ICD-10-CM

## 2022-09-16 DIAGNOSIS — Z85.46 PERSONAL HISTORY OF MALIGNANT NEOPLASM OF PROSTATE: ICD-10-CM

## 2022-09-16 DIAGNOSIS — Z80.42 FAMILY HISTORY OF PROSTATE CANCER IN FATHER: ICD-10-CM

## 2022-09-16 DIAGNOSIS — C77.5 PROSTATE CANCER METASTATIC TO INTRAPELVIC LYMPH NODE: Primary | ICD-10-CM

## 2022-09-16 PROCEDURE — 99999 PR PBB SHADOW E&M-EST. PATIENT-LVL IV: ICD-10-PCS | Mod: PBBFAC,,, | Performed by: UROLOGY

## 2022-09-16 PROCEDURE — 3079F DIAST BP 80-89 MM HG: CPT | Mod: CPTII,,, | Performed by: UROLOGY

## 2022-09-16 PROCEDURE — 1159F MED LIST DOCD IN RCRD: CPT | Mod: CPTII,,, | Performed by: UROLOGY

## 2022-09-16 PROCEDURE — 99999 PR PBB SHADOW E&M-EST. PATIENT-LVL IV: CPT | Mod: PBBFAC,,, | Performed by: UROLOGY

## 2022-09-16 PROCEDURE — 99214 OFFICE O/P EST MOD 30 MIN: CPT | Mod: PBBFAC,PO | Performed by: UROLOGY

## 2022-09-16 PROCEDURE — 3075F SYST BP GE 130 - 139MM HG: CPT | Mod: CPTII,,, | Performed by: UROLOGY

## 2022-09-16 PROCEDURE — 3079F PR MOST RECENT DIASTOLIC BLOOD PRESSURE 80-89 MM HG: ICD-10-PCS | Mod: CPTII,,, | Performed by: UROLOGY

## 2022-09-16 PROCEDURE — 4010F ACE/ARB THERAPY RXD/TAKEN: CPT | Mod: CPTII,,, | Performed by: UROLOGY

## 2022-09-16 PROCEDURE — 3075F PR MOST RECENT SYSTOLIC BLOOD PRESS GE 130-139MM HG: ICD-10-PCS | Mod: CPTII,,, | Performed by: UROLOGY

## 2022-09-16 PROCEDURE — 1159F PR MEDICATION LIST DOCUMENTED IN MEDICAL RECORD: ICD-10-PCS | Mod: CPTII,,, | Performed by: UROLOGY

## 2022-09-16 PROCEDURE — 99215 OFFICE O/P EST HI 40 MIN: CPT | Mod: S$PBB,,, | Performed by: UROLOGY

## 2022-09-16 PROCEDURE — 99215 PR OFFICE/OUTPT VISIT, EST, LEVL V, 40-54 MIN: ICD-10-PCS | Mod: S$PBB,,, | Performed by: UROLOGY

## 2022-09-16 PROCEDURE — 1160F PR REVIEW ALL MEDS BY PRESCRIBER/CLIN PHARMACIST DOCUMENTED: ICD-10-PCS | Mod: CPTII,,, | Performed by: UROLOGY

## 2022-09-16 PROCEDURE — 3008F BODY MASS INDEX DOCD: CPT | Mod: CPTII,,, | Performed by: UROLOGY

## 2022-09-16 PROCEDURE — 4010F PR ACE/ARB THEARPY RXD/TAKEN: ICD-10-PCS | Mod: CPTII,,, | Performed by: UROLOGY

## 2022-09-16 PROCEDURE — 1160F RVW MEDS BY RX/DR IN RCRD: CPT | Mod: CPTII,,, | Performed by: UROLOGY

## 2022-09-16 PROCEDURE — 3008F PR BODY MASS INDEX (BMI) DOCUMENTED: ICD-10-PCS | Mod: CPTII,,, | Performed by: UROLOGY

## 2022-09-16 RX ORDER — PROMETHAZINE HYDROCHLORIDE 25 MG/1
25 TABLET ORAL EVERY 6 HOURS PRN
COMMUNITY
Start: 2022-09-12 | End: 2022-11-30

## 2022-09-16 RX ORDER — PROCHLORPERAZINE MALEATE 10 MG
10 TABLET ORAL EVERY 8 HOURS PRN
COMMUNITY
Start: 2022-09-14 | End: 2022-11-30

## 2022-09-16 RX ORDER — METFORMIN HYDROCHLORIDE 500 MG/1
500 TABLET ORAL 2 TIMES DAILY WITH MEALS
COMMUNITY
End: 2023-06-09 | Stop reason: ALTCHOICE

## 2022-09-16 RX ORDER — DICYCLOMINE HYDROCHLORIDE 20 MG/1
20 TABLET ORAL 2 TIMES DAILY PRN
COMMUNITY
Start: 2022-09-15 | End: 2022-10-15

## 2022-09-16 RX ORDER — PROCHLORPERAZINE MALEATE 10 MG
TABLET ORAL
COMMUNITY
Start: 2022-09-15 | End: 2022-11-30

## 2022-09-16 RX ORDER — AMLODIPINE BESYLATE 10 MG/1
10 TABLET ORAL NIGHTLY
COMMUNITY
Start: 2022-07-18

## 2022-09-16 RX ORDER — BICALUTAMIDE 50 MG/1
50 TABLET, FILM COATED ORAL DAILY
Qty: 30 TABLET | Refills: 11 | Status: SHIPPED | OUTPATIENT
Start: 2022-09-16 | End: 2023-02-01

## 2022-09-16 RX ORDER — ONDANSETRON 8 MG/1
8 TABLET, ORALLY DISINTEGRATING ORAL EVERY 8 HOURS PRN
COMMUNITY
Start: 2022-09-13 | End: 2022-11-30

## 2022-09-16 NOTE — PATIENT INSTRUCTIONS
Start Casodex 50 mg daily  Follow-up in 2 weeks for Trelstar injection  Repeat PSA and testosterone level 12 weeks after injection prior to receiving repeat injection every 12 weeks.

## 2022-09-16 NOTE — PROGRESS NOTES
Mr Benitez   Subjective:       Patient ID: Mauro Benitez is a 56 y.o. male.    Chief Complaint: Prostate cancer mgmt     Mr. Benitez  is a 56-year-old gentleman with a history of prostate adenocarcinoma.  He was treated in [facility] in 2015.  The patient ended up requiring radiation treatment postoperatively.  He had been doing well until recently when his PSA was found to have risen.  Most recent PSA was 2.8 this was in June of 2022.  The patient underwent a nuclear medicine PET-CT scan and was found have metastatic disease to the left external iliac nodes, a para aortic node and a presacral node.  The patient presents today to initiate hormonal blockade for prostate cancer.  Patient presents with his wife to discuss results.  Original requested records from [facility] in [place] as well as path report have not arrived in clinic at this time.  Patient also with symptoms of diabetic gastroparesis.  He is going to see a gastroenterologist within the next few weeks to be evaluated for this.  Patient has been having emesis up to 20 times a day with mostly mucus coming out and unable to tolerate food.  The patient is in the we can fatigue state due to poor nutrition.  Was recommended for the patient to try to start with and ensure type food made for diabetics and to follow-up with a gastroenterologist as soon as possible.    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 The current episode started more than 1 month ago. The problem occurs constantly. The problem has been waxing and waning. Associated symptoms include fatigue and weakness. Pertinent negatives include no abdominal pain, anorexia, arthralgias, change in bowel habit, chest pain, chills, congestion, coughing, diaphoresis, fever, headaches, joint swelling, myalgias, nausea, neck pain, numbness, rash, sore throat, swollen glands, urinary symptoms, vertigo, visual change or vomiting. Nothing  aggravates the symptoms. He has tried nothing for the symptoms.   Review of Systems   Constitutional:  Positive for fatigue. Negative for activity change, appetite change, chills, diaphoresis, fever and unexpected weight change.   HENT:  Negative for congestion, hearing loss, sinus pressure, sore throat and trouble swallowing.    Eyes:  Negative for photophobia, pain, discharge and visual disturbance.   Respiratory:  Negative for apnea, cough and shortness of breath.    Cardiovascular:  Negative for chest pain, palpitations and leg swelling.   Gastrointestinal:  Negative for abdominal distention, abdominal pain, anal bleeding, anorexia, blood in stool, change in bowel habit, constipation, diarrhea, nausea, rectal pain and vomiting.   Endocrine: Negative for cold intolerance, heat intolerance, polydipsia, polyphagia and polyuria.   Genitourinary:  Negative for decreased urine volume, difficulty urinating, dysuria, enuresis, flank pain, frequency, genital sores, hematuria, penile discharge, penile pain, penile swelling, scrotal swelling, testicular pain and urgency.   Musculoskeletal:  Negative for arthralgias, back pain, joint swelling, myalgias and neck pain.   Skin:  Negative for color change, pallor, rash and wound.   Allergic/Immunologic: Negative for environmental allergies, food allergies and immunocompromised state.   Neurological:  Positive for weakness. Negative for dizziness, vertigo, seizures, numbness and headaches.   Hematological:  Negative for adenopathy. Does not bruise/bleed easily.   Psychiatric/Behavioral: Negative.       Objective:      Physical Exam  Vitals and nursing note reviewed.   Constitutional:       General: He is not in acute distress.     Appearance: Normal appearance. He is well-developed. He is not ill-appearing or diaphoretic.   HENT:      Head: Normocephalic.      Right Ear: External ear normal.      Left Ear: External ear normal.      Nose: Nose normal. No congestion or rhinorrhea.       Mouth/Throat:      Pharynx: No oropharyngeal exudate or posterior oropharyngeal erythema.   Eyes:      Extraocular Movements: Extraocular movements intact.      Conjunctiva/sclera: Conjunctivae normal.      Pupils: Pupils are equal, round, and reactive to light.   Cardiovascular:      Rate and Rhythm: Normal rate and regular rhythm.      Pulses: Normal pulses.      Heart sounds: Normal heart sounds.   Pulmonary:      Effort: Pulmonary effort is normal.      Breath sounds: Normal breath sounds.   Abdominal:      General: Bowel sounds are normal.      Palpations: Abdomen is soft.      Tenderness: There is no right CVA tenderness or left CVA tenderness.   Genitourinary:     Penis: Normal.       Testes: Normal.   Musculoskeletal:         General: Normal range of motion.      Cervical back: Normal range of motion and neck supple.   Skin:     General: Skin is warm and dry.   Neurological:      Mental Status: He is alert and oriented to person, place, and time.      Deep Tendon Reflexes: Reflexes are normal and symmetric.   Psychiatric:         Behavior: Behavior normal.         Thought Content: Thought content normal.         Judgment: Judgment normal.       Assessment:       1. Prostate cancer metastatic to intrapelvic lymph node    2. Elevated PSA, less than 10 ng/ml    3. Family history of prostate cancer in father    4. Personal history of malignant neoplasm of prostate          Plan:       Patient Instructions   Start Casodex 50 mg daily  Follow-up in 2 weeks for Trelstar injection  Repeat PSA and testosterone level 12 weeks after injection prior to receiving repeat injection every 12 weeks.

## 2022-10-03 ENCOUNTER — OFFICE VISIT (OUTPATIENT)
Dept: UROLOGY | Facility: CLINIC | Age: 57
End: 2022-10-03
Payer: MEDICAID

## 2022-10-03 VITALS
BODY MASS INDEX: 28.09 KG/M2 | TEMPERATURE: 98 F | SYSTOLIC BLOOD PRESSURE: 114 MMHG | RESPIRATION RATE: 16 BRPM | HEART RATE: 85 BPM | HEIGHT: 71 IN | OXYGEN SATURATION: 97 % | WEIGHT: 200.63 LBS | DIASTOLIC BLOOD PRESSURE: 68 MMHG

## 2022-10-03 DIAGNOSIS — Z53.20 PROCEDURE NOT CARRIED OUT BECAUSE OF PATIENT'S DECISION: Primary | ICD-10-CM

## 2022-10-03 DIAGNOSIS — C61 PROSTATE CANCER METASTATIC TO INTRAPELVIC LYMPH NODE: ICD-10-CM

## 2022-10-03 DIAGNOSIS — Z80.42 FAMILY HISTORY OF PROSTATE CANCER IN FATHER: ICD-10-CM

## 2022-10-03 DIAGNOSIS — C77.5 PROSTATE CANCER METASTATIC TO INTRAPELVIC LYMPH NODE: ICD-10-CM

## 2022-10-03 DIAGNOSIS — R97.20 ELEVATED PSA, LESS THAN 10 NG/ML: ICD-10-CM

## 2022-10-03 DIAGNOSIS — Z85.46 PERSONAL HISTORY OF MALIGNANT NEOPLASM OF PROSTATE: Primary | ICD-10-CM

## 2022-10-03 PROCEDURE — 99999 PR PBB SHADOW E&M-EST. PATIENT-LVL III: ICD-10-PCS | Mod: PBBFAC,,,

## 2022-10-03 PROCEDURE — 4010F ACE/ARB THERAPY RXD/TAKEN: CPT | Mod: CPTII,,, | Performed by: UROLOGY

## 2022-10-03 PROCEDURE — 3078F PR MOST RECENT DIASTOLIC BLOOD PRESSURE < 80 MM HG: ICD-10-PCS | Mod: CPTII,,, | Performed by: UROLOGY

## 2022-10-03 PROCEDURE — 3074F SYST BP LT 130 MM HG: CPT | Mod: CPTII,,, | Performed by: UROLOGY

## 2022-10-03 PROCEDURE — 3008F PR BODY MASS INDEX (BMI) DOCUMENTED: ICD-10-PCS | Mod: CPTII,,, | Performed by: UROLOGY

## 2022-10-03 PROCEDURE — 99499 NO LOS: ICD-10-PCS | Mod: S$PBB,,, | Performed by: UROLOGY

## 2022-10-03 PROCEDURE — 99213 OFFICE O/P EST LOW 20 MIN: CPT | Mod: PBBFAC,PO

## 2022-10-03 PROCEDURE — 4010F PR ACE/ARB THEARPY RXD/TAKEN: ICD-10-PCS | Mod: CPTII,,, | Performed by: UROLOGY

## 2022-10-03 PROCEDURE — 99499 UNLISTED E&M SERVICE: CPT | Mod: S$PBB,,, | Performed by: UROLOGY

## 2022-10-03 PROCEDURE — 99999 PR PBB SHADOW E&M-EST. PATIENT-LVL III: CPT | Mod: PBBFAC,,,

## 2022-10-03 PROCEDURE — 3008F BODY MASS INDEX DOCD: CPT | Mod: CPTII,,, | Performed by: UROLOGY

## 2022-10-03 PROCEDURE — 3078F DIAST BP <80 MM HG: CPT | Mod: CPTII,,, | Performed by: UROLOGY

## 2022-10-03 PROCEDURE — 3074F PR MOST RECENT SYSTOLIC BLOOD PRESSURE < 130 MM HG: ICD-10-PCS | Mod: CPTII,,, | Performed by: UROLOGY

## 2022-10-03 PROCEDURE — 96402 CHEMO HORMON ANTINEOPL SQ/IM: CPT | Mod: PBBFAC,PO

## 2022-10-03 RX ADMIN — TRIPTORELIN PAMOATE 11.25 MG: KIT at 09:10

## 2022-10-04 ENCOUNTER — TELEPHONE (OUTPATIENT)
Dept: UROLOGY | Facility: CLINIC | Age: 57
End: 2022-10-04
Payer: MEDICAID

## 2022-10-04 NOTE — TELEPHONE ENCOUNTER
----- Message from Edward Edmond MD sent at 10/3/2022 11:50 AM CDT -----  Regarding: RE: casodex  Patient can stop Casodex.  Will monitor response to injection alone and add Casodex later if he does not respond appropriately.  ----- Message -----  From: Mini Peña LPN  Sent: 10/3/2022   9:23 AM CDT  To: Edward Edmond MD  Subject: casodex                                          Pt has been on casodex for 2 weeks and has received trelstar today. Does he continue casodex or discontinue since he is receiving trelstar?

## 2022-10-31 ENCOUNTER — TELEPHONE (OUTPATIENT)
Dept: GASTROENTEROLOGY | Facility: CLINIC | Age: 57
End: 2022-10-31

## 2022-10-31 ENCOUNTER — OFFICE VISIT (OUTPATIENT)
Dept: GASTROENTEROLOGY | Facility: CLINIC | Age: 57
End: 2022-10-31
Payer: MEDICAID

## 2022-10-31 VITALS
SYSTOLIC BLOOD PRESSURE: 147 MMHG | HEART RATE: 80 BPM | BODY MASS INDEX: 28.96 KG/M2 | DIASTOLIC BLOOD PRESSURE: 93 MMHG | WEIGHT: 206.88 LBS | HEIGHT: 71 IN

## 2022-10-31 DIAGNOSIS — R10.84 GENERALIZED ABDOMINAL PAIN: ICD-10-CM

## 2022-10-31 DIAGNOSIS — R11.2 NAUSEA AND VOMITING, UNSPECIFIED VOMITING TYPE: Primary | ICD-10-CM

## 2022-10-31 DIAGNOSIS — K59.04 CHRONIC IDIOPATHIC CONSTIPATION: ICD-10-CM

## 2022-10-31 PROBLEM — Q82.8: Status: ACTIVE | Noted: 2019-05-09

## 2022-10-31 PROBLEM — E11.42 DIABETIC PERIPHERAL NEUROPATHY: Status: ACTIVE | Noted: 2019-08-21

## 2022-10-31 PROBLEM — G47.30 SLEEP APNEA: Status: ACTIVE | Noted: 2022-04-27

## 2022-10-31 PROBLEM — M67.431 GANGLION OF RIGHT WRIST: Status: ACTIVE | Noted: 2022-04-27

## 2022-10-31 PROBLEM — Z85.46 HISTORY OF MALIGNANT NEOPLASM OF PROSTATE: Status: ACTIVE | Noted: 2019-04-25

## 2022-10-31 PROBLEM — N52.9 PRIMARY ERECTILE DYSFUNCTION: Status: ACTIVE | Noted: 2022-09-28

## 2022-10-31 PROBLEM — E55.9 VITAMIN D DEFICIENCY: Status: ACTIVE | Noted: 2022-09-28

## 2022-10-31 PROBLEM — E27.8 MASS OF BOTH ADRENAL GLANDS: Status: ACTIVE | Noted: 2022-09-28

## 2022-10-31 PROBLEM — J30.2 SEASONAL ALLERGIC RHINITIS: Status: ACTIVE | Noted: 2021-12-21

## 2022-10-31 PROCEDURE — 4010F ACE/ARB THERAPY RXD/TAKEN: CPT | Mod: CPTII,,, | Performed by: NURSE PRACTITIONER

## 2022-10-31 PROCEDURE — 99215 OFFICE O/P EST HI 40 MIN: CPT | Mod: PBBFAC,PO | Performed by: NURSE PRACTITIONER

## 2022-10-31 PROCEDURE — 1159F PR MEDICATION LIST DOCUMENTED IN MEDICAL RECORD: ICD-10-PCS | Mod: CPTII,,, | Performed by: NURSE PRACTITIONER

## 2022-10-31 PROCEDURE — 99999 PR PBB SHADOW E&M-EST. PATIENT-LVL V: ICD-10-PCS | Mod: PBBFAC,,, | Performed by: NURSE PRACTITIONER

## 2022-10-31 PROCEDURE — 1160F PR REVIEW ALL MEDS BY PRESCRIBER/CLIN PHARMACIST DOCUMENTED: ICD-10-PCS | Mod: CPTII,,, | Performed by: NURSE PRACTITIONER

## 2022-10-31 PROCEDURE — 3080F PR MOST RECENT DIASTOLIC BLOOD PRESSURE >= 90 MM HG: ICD-10-PCS | Mod: CPTII,,, | Performed by: NURSE PRACTITIONER

## 2022-10-31 PROCEDURE — 99999 PR PBB SHADOW E&M-EST. PATIENT-LVL V: CPT | Mod: PBBFAC,,, | Performed by: NURSE PRACTITIONER

## 2022-10-31 PROCEDURE — 3080F DIAST BP >= 90 MM HG: CPT | Mod: CPTII,,, | Performed by: NURSE PRACTITIONER

## 2022-10-31 PROCEDURE — 3077F PR MOST RECENT SYSTOLIC BLOOD PRESSURE >= 140 MM HG: ICD-10-PCS | Mod: CPTII,,, | Performed by: NURSE PRACTITIONER

## 2022-10-31 PROCEDURE — 3077F SYST BP >= 140 MM HG: CPT | Mod: CPTII,,, | Performed by: NURSE PRACTITIONER

## 2022-10-31 PROCEDURE — 99203 PR OFFICE/OUTPT VISIT, NEW, LEVL III, 30-44 MIN: ICD-10-PCS | Mod: S$PBB,,, | Performed by: NURSE PRACTITIONER

## 2022-10-31 PROCEDURE — 1160F RVW MEDS BY RX/DR IN RCRD: CPT | Mod: CPTII,,, | Performed by: NURSE PRACTITIONER

## 2022-10-31 PROCEDURE — 1159F MED LIST DOCD IN RCRD: CPT | Mod: CPTII,,, | Performed by: NURSE PRACTITIONER

## 2022-10-31 PROCEDURE — 4010F PR ACE/ARB THEARPY RXD/TAKEN: ICD-10-PCS | Mod: CPTII,,, | Performed by: NURSE PRACTITIONER

## 2022-10-31 PROCEDURE — 99203 OFFICE O/P NEW LOW 30 MIN: CPT | Mod: S$PBB,,, | Performed by: NURSE PRACTITIONER

## 2022-10-31 RX ORDER — POLYETHYLENE GLYCOL 3350 17 G/17G
17 POWDER, FOR SOLUTION ORAL DAILY
Qty: 510 G | Refills: 11 | Status: SHIPPED | OUTPATIENT
Start: 2022-10-31 | End: 2023-02-01

## 2022-10-31 NOTE — TELEPHONE ENCOUNTER
----- Message from NATALYA Cali sent at 10/31/2022  3:07 PM CDT -----  X-ray of abdomen showed a moderate amount of stool throughout your colon. I would like for you to buy a small box of dulcolax tablets and bottle of liquid Jay's Milk of Magnesia (do not use the pills).  When you have time to stay home near the toilet take 2 Dulcolax tablets. Then in 1 hour drink 60 mL of milk of magnesia. Drink another 60 mL 2-3 hours later.     After that is complete, start Miralax 1 capful in 6-8 ounces of water or juice daily (I will send prescription to your pharmacy) and an over the counter fiber supplement (such as Fiber gummy or Metamucil capsules once daily).

## 2022-10-31 NOTE — PROGRESS NOTES
Subjective:       Patient ID: Mauro Benitez is a 56 y.o. male.    Chief Complaint: Abdominal Pain    55 y/o male with hx of hypertension, type 2 diabetes, cannabinoid hyperemesis syndrome, and prostate cancer referred by PCP for abdominal pain, nausea, and vomiting. Reports no marijuana use in over 2 months.     Abdominal Pain  This is a recurrent problem. The current episode started more than 1 month ago. The problem occurs intermittently. The problem has been waxing and waning. The pain is located in the generalized abdominal region. The quality of the pain is aching and cramping. Associated symptoms include nausea and vomiting. Pertinent negatives include no belching, constipation, diarrhea, fever, hematochezia, melena or weight loss. Nothing aggravates the pain. The pain is relieved by Nothing. He has tried antacids and proton pump inhibitors for the symptoms. The treatment provided mild relief. Prior diagnostic workup includes CT scan. There is no history of gallstones or GERD. Patient's medical history does not include kidney stones.     Past Medical History:   Diagnosis Date    Diabetes mellitus     GERD (gastroesophageal reflux disease)     Hypertension        History reviewed. No pertinent surgical history.    History reviewed. No pertinent family history.    Social History     Socioeconomic History    Marital status: Single   Tobacco Use    Smoking status: Every Day     Types: Cigars    Smokeless tobacco: Never    Tobacco comments:     2-3 cigars/day   Substance and Sexual Activity    Alcohol use: Never    Drug use: Yes     Types: Marijuana    Sexual activity: Yes     Partners: Male       Review of Systems   Constitutional:  Negative for appetite change, fever and weight loss.   HENT:  Negative for trouble swallowing.    Respiratory:  Negative for shortness of breath.    Cardiovascular:  Negative for chest pain.   Gastrointestinal:  Positive for abdominal pain, nausea and vomiting. Negative for  "constipation, diarrhea, hematochezia and melena.   Musculoskeletal:  Negative for back pain.   Neurological:  Negative for dizziness and weakness.   Hematological:  Negative for adenopathy. Does not bruise/bleed easily.   Psychiatric/Behavioral:  Negative for dysphoric mood.        Objective:     Vitals:    10/31/22 1308   BP: (!) 147/93   BP Location: Right arm   Patient Position: Sitting   BP Method: Large (Automatic)   Pulse: 80   Weight: 93.8 kg (206 lb 14.4 oz)   Height: 5' 11" (1.803 m)          Physical Exam  Constitutional:       General: He is not in acute distress.     Appearance: Normal appearance. He is not ill-appearing.   HENT:      Head: Normocephalic.   Eyes:      Pupils: Pupils are equal, round, and reactive to light.   Pulmonary:      Effort: Pulmonary effort is normal. No respiratory distress.   Musculoskeletal:         General: Normal range of motion.      Cervical back: Normal range of motion.   Skin:     General: Skin is warm and dry.   Neurological:      Mental Status: He is alert and oriented to person, place, and time.   Psychiatric:         Mood and Affect: Mood normal.         Behavior: Behavior normal.             Assessment:         ICD-10-CM ICD-9-CM   1. Nausea and vomiting, unspecified vomiting type  R11.2 787.01   2. Generalized abdominal pain  R10.84 789.07       Plan:       Nausea and vomiting, unspecified vomiting type  -     NM Gastric Emptying; Future; Expected date: 10/31/2022  -     Case Request Endoscopy: EGD (ESOPHAGOGASTRODUODENOSCOPY)    Generalized abdominal pain  -     Ambulatory referral/consult to Gastroenterology  -     X-Ray Abdomen Flat And Erect; Future; Expected date: 10/31/2022    Follow up if symptoms worsen or fail to improve.     Patient's Medications   New Prescriptions    No medications on file   Previous Medications    AMLODIPINE (NORVASC) 10 MG TABLET    Take 10 mg by mouth every evening.    ASPIRIN (ECOTRIN) 81 MG EC TABLET    Aspir-81 mg tablet,delayed " release   Take 1 tablet every day by oral route in the morning for 30 days.    ATORVASTATIN (LIPITOR) 20 MG TABLET    Take 20 mg by mouth once daily.    BICALUTAMIDE (CASODEX) 50 MG TAB    Take 1 tablet (50 mg total) by mouth once daily.    BLOOD SUGAR DIAGNOSTIC (ONETOUCH VERIO TEST STRIPS MISC)    OneTouch Verio test strips    CARVEDILOL (COREG) 12.5 MG TABLET    Take 12.5 mg by mouth 2 (two) times daily with meals.    CYCLOBENZAPRINE (FLEXERIL) 5 MG TABLET    cyclobenzaprine 5 mg tablet   TAKE 1 TABLET BY MOUTH THREE TIMES DAILY FOR MUSCLE SPASMS FOR UP TO 10 DAYS    CYPROHEPTADINE (PERIACTIN) 4 MG TABLET    Take by mouth.    ERGOCALCIFEROL (ERGOCALCIFEROL) 50,000 UNIT CAP    Vitamin D2 1,250 mcg (50,000 unit) capsule    FLUTICASONE PROPIONATE (FLONASE) 50 MCG/ACTUATION NASAL SPRAY    fluticasone propionate 50 mcg/actuation nasal spray,suspension   SPRAY 2 SPRAYS EVERY DAY BY INTRANASAL ROUTE AS NEEDED.    GABAPENTIN (NEURONTIN) 300 MG CAPSULE    Take 300 mg by mouth 3 (three) times daily.    INDOMETHACIN (INDOCIN) 25 MG CAPSULE    indomethacin 25 mg capsule   TAKE 1 CAPSULE BY MOUTH THREE TIMES A DAY WITH MEALS    LORATADINE (CLARITIN) 10 MG TABLET    loratadine 10 mg tablet    LOSARTAN (COZAAR) 100 MG TABLET    Take 100 mg by mouth once daily.    LOSARTAN-HYDROCHLOROTHIAZIDE 100-25 MG (HYZAAR) 100-25 MG PER TABLET    TAKE 1 TABLET(S) EVERY DAY BY ORAL ROUTE IN THE MORNING.    METFORMIN (GLUCOPHAGE) 500 MG TABLET    metformin 500 mg tablet    OMEPRAZOLE (PRILOSEC) 20 MG CAPSULE    omeprazole 20 mg capsule,delayed release   TAKE 1 TABLET BY MOUTH EVERY DAY    ONDANSETRON (ZOFRAN-ODT) 8 MG TBDL    8 mg every 8 (eight) hours as needed.    OXYBUTYNIN (DITROPAN-XL) 10 MG 24 HR TABLET    oxybutynin chloride ER 10 mg tablet,extended release 24 hr   TAKE 1 TABLET BY MOUTH DAILY    PROCHLORPERAZINE (COMPAZINE) 10 MG TABLET    Take 10 mg by mouth every 8 (eight) hours as needed.    PROCHLORPERAZINE (COMPAZINE) 10 MG  TABLET    Take by mouth.    PROMETHAZINE (PHENERGAN) 25 MG TABLET    Take 25 mg by mouth every 6 (six) hours as needed.    SILDENAFIL (VIAGRA) 100 MG TABLET    Take 100 mg by mouth daily as needed for Erectile Dysfunction.   Modified Medications    No medications on file   Discontinued Medications    AMLODIPINE (NORVASC) 5 MG TABLET    Take 5 mg by mouth once daily.    METFORMIN (GLUCOPHAGE) 1000 MG TABLET    Take 1,000 mg by mouth 2 (two) times daily with meals.

## 2022-10-31 NOTE — PATIENT INSTRUCTIONS
EGD Prep Instructions    [facility] [address] [place]  [address]    You are scheduled for an EGD with Dr. Valera on 12/5/2022 at [facility].  You will enter through the [facility] entrance and check in at Same Day Surgery.    Start a CLEAR LIQUID DIET at 5 pm ONE DAY BEFORE YOUR PROCEDURE.  This means no solid food after 5 pm.   CLEAR LIQUID DIET:   - Avoid Red, Orange, Purple, and/or Blue food coloring   - NO DAIRY   - You can have:  Coffee with sugar (no creamer), tea, water, soda, apple or white grape juice, chicken or beef broth/bouillon (no meat, noodles, or veggies), green/yellow popsicles, green/yellow Jell-O, lemonade.       Nothing to eat or drink after midnight before the procedure.  You MAY brush your teeth.    You MAY take your blood pressure, heart, and seizure medication on the morning of the procedure, with a SIP of water.  Hold ALL other medications until after the procedure.    You must have someone with you to DRIVE YOU HOME since you will be receiving IV sedation for the procedure.    If you are on blood thinners THAT YOU HAVE BEEN INSTRUCTED TO HOLD BY YOUR DOCTOR FOR THIS PROCEDURE, then do NOT take this the morning of your EGD.  Do NOT stop these medications on your own, they must be approved to be held by your doctor.  Your EGD can NOT be done if you are on these medications.  Examples of blood thinners include: Coumadin, Aggrenox, Plavix, Pradaxa, Reapro, Pletal, Xarelto, Ticagrelor, Brilinta, Eliquis, and high dose aspirin (325 mg).  You do not have to stop baby aspirin 81 mg.    You will receive a call a few days before your EGD to tell you the time to arrive.  If you have not received a call by the day before your procedure, call the Pre-op Coordinator at [phone].

## 2022-12-09 ENCOUNTER — TELEPHONE (OUTPATIENT)
Dept: GASTROENTEROLOGY | Facility: CLINIC | Age: 57
End: 2022-12-09
Payer: MEDICAID

## 2022-12-22 ENCOUNTER — TELEPHONE (OUTPATIENT)
Dept: GASTROENTEROLOGY | Facility: CLINIC | Age: 57
End: 2022-12-22
Payer: MEDICAID

## 2022-12-22 NOTE — TELEPHONE ENCOUNTER
----- Message from Beronica Valera MD sent at 12/22/2022 10:57 AM CST -----  HP (-), cont PPI, RTC as needed

## 2023-01-04 ENCOUNTER — OFFICE VISIT (OUTPATIENT)
Dept: UROLOGY | Facility: CLINIC | Age: 58
End: 2023-01-04
Payer: MEDICAID

## 2023-01-04 VITALS
WEIGHT: 201.81 LBS | HEIGHT: 71 IN | DIASTOLIC BLOOD PRESSURE: 70 MMHG | SYSTOLIC BLOOD PRESSURE: 120 MMHG | BODY MASS INDEX: 28.25 KG/M2 | HEART RATE: 68 BPM

## 2023-01-04 DIAGNOSIS — Z85.46 PERSONAL HISTORY OF MALIGNANT NEOPLASM OF PROSTATE: ICD-10-CM

## 2023-01-04 DIAGNOSIS — C61 PROSTATE CANCER METASTATIC TO INTRAPELVIC LYMPH NODE: Primary | ICD-10-CM

## 2023-01-04 DIAGNOSIS — C77.5 PROSTATE CANCER METASTATIC TO INTRAPELVIC LYMPH NODE: Primary | ICD-10-CM

## 2023-01-04 DIAGNOSIS — R97.20 ELEVATED PSA, LESS THAN 10 NG/ML: ICD-10-CM

## 2023-01-04 PROCEDURE — 99999 PR PBB SHADOW E&M-EST. PATIENT-LVL IV: ICD-10-PCS | Mod: PBBFAC,,, | Performed by: UROLOGY

## 2023-01-04 PROCEDURE — 99999 PR PBB SHADOW E&M-EST. PATIENT-LVL IV: CPT | Mod: PBBFAC,,, | Performed by: UROLOGY

## 2023-01-04 PROCEDURE — 99214 OFFICE O/P EST MOD 30 MIN: CPT | Mod: PBBFAC,PO | Performed by: UROLOGY

## 2023-01-04 PROCEDURE — 1159F PR MEDICATION LIST DOCUMENTED IN MEDICAL RECORD: ICD-10-PCS | Mod: CPTII,,, | Performed by: UROLOGY

## 2023-01-04 PROCEDURE — 3078F DIAST BP <80 MM HG: CPT | Mod: CPTII,,, | Performed by: UROLOGY

## 2023-01-04 PROCEDURE — 96402 CHEMO HORMON ANTINEOPL SQ/IM: CPT | Mod: PBBFAC,PO

## 2023-01-04 PROCEDURE — 3074F PR MOST RECENT SYSTOLIC BLOOD PRESSURE < 130 MM HG: ICD-10-PCS | Mod: CPTII,,, | Performed by: UROLOGY

## 2023-01-04 PROCEDURE — 99215 PR OFFICE/OUTPT VISIT, EST, LEVL V, 40-54 MIN: ICD-10-PCS | Mod: S$PBB,,, | Performed by: UROLOGY

## 2023-01-04 PROCEDURE — 1159F MED LIST DOCD IN RCRD: CPT | Mod: CPTII,,, | Performed by: UROLOGY

## 2023-01-04 PROCEDURE — 1160F RVW MEDS BY RX/DR IN RCRD: CPT | Mod: CPTII,,, | Performed by: UROLOGY

## 2023-01-04 PROCEDURE — 3078F PR MOST RECENT DIASTOLIC BLOOD PRESSURE < 80 MM HG: ICD-10-PCS | Mod: CPTII,,, | Performed by: UROLOGY

## 2023-01-04 PROCEDURE — 99215 OFFICE O/P EST HI 40 MIN: CPT | Mod: S$PBB,,, | Performed by: UROLOGY

## 2023-01-04 PROCEDURE — 3008F BODY MASS INDEX DOCD: CPT | Mod: CPTII,,, | Performed by: UROLOGY

## 2023-01-04 PROCEDURE — 1160F PR REVIEW ALL MEDS BY PRESCRIBER/CLIN PHARMACIST DOCUMENTED: ICD-10-PCS | Mod: CPTII,,, | Performed by: UROLOGY

## 2023-01-04 PROCEDURE — 3008F PR BODY MASS INDEX (BMI) DOCUMENTED: ICD-10-PCS | Mod: CPTII,,, | Performed by: UROLOGY

## 2023-01-04 PROCEDURE — 3074F SYST BP LT 130 MM HG: CPT | Mod: CPTII,,, | Performed by: UROLOGY

## 2023-01-04 RX ORDER — PROCHLORPERAZINE MALEATE 10 MG
10 TABLET ORAL EVERY 8 HOURS PRN
COMMUNITY
Start: 2023-01-03 | End: 2023-02-01

## 2023-01-04 RX ORDER — GABAPENTIN 400 MG/1
400 CAPSULE ORAL 3 TIMES DAILY
COMMUNITY
Start: 2022-10-26 | End: 2023-09-08 | Stop reason: ALTCHOICE

## 2023-01-04 RX ORDER — VENLAFAXINE 25 MG/1
25 TABLET ORAL 2 TIMES DAILY
Qty: 60 TABLET | Refills: 11 | Status: SHIPPED | OUTPATIENT
Start: 2023-01-04 | End: 2023-02-10

## 2023-01-04 RX ADMIN — TRIPTORELIN PAMOATE 11.25 MG: KIT at 08:01

## 2023-01-04 NOTE — PROGRESS NOTES
Subjective:       Patient ID: Mauro Benitez is a 57 y.o. male.    Chief Complaint: Prostate cancer - trelstar    To Mauro is a 57-year-old gentleman who is status post radical retropubic prostatectomy in 2015 at [facility] in [place].  The patient had moved to [place] and was seeing Dr. Roberts for medical follow-up and was found have an elevated PSA.  He was referred for evaluation and nurse practitioner Richie ordered a PSMA Pylarify 18 scan which showed positive lymph nodes in the pelvis and periaortic area.  The patient was started on Casodex 50 mg daily for 2 weeks.  The patient then underwent Trelstar injection.  He is responded real well and is here for 12 week follow-up with injection.  His PSA dropped from 2.8-0.02.  The patient is having symptoms of hot flashes which are fairly intense but no other complaints.    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 The current episode started more than 1 year ago. The problem occurs constantly. The problem has been rapidly improving. Associated symptoms include fatigue and weakness. Pertinent negatives include no abdominal pain, anorexia, arthralgias, change in bowel habit, chest pain, chills, congestion, coughing, diaphoresis, fever, headaches, joint swelling, myalgias, nausea, neck pain, numbness, rash, sore throat, swollen glands, urinary symptoms, vertigo, visual change or vomiting. Associated symptoms comments: Hot flashes. Exacerbated by: Trelstar injection. Treatments tried: Trelstar injection. The treatment provided significant relief.   Review of Systems   Constitutional:  Positive for fatigue. Negative for activity change, appetite change, chills, diaphoresis, fever and unexpected weight change.   HENT:  Negative for congestion, hearing loss, sinus pressure, sore throat and trouble swallowing.    Eyes:  Negative for photophobia, pain, discharge and visual disturbance.    Respiratory:  Negative for apnea, cough and shortness of breath.    Cardiovascular:  Negative for chest pain, palpitations and leg swelling.   Gastrointestinal:  Negative for abdominal distention, abdominal pain, anal bleeding, anorexia, blood in stool, change in bowel habit, constipation, diarrhea, nausea, rectal pain and vomiting.   Endocrine: Negative for cold intolerance, heat intolerance, polydipsia, polyphagia and polyuria.   Genitourinary:  Negative for decreased urine volume, difficulty urinating, dysuria, enuresis, flank pain, frequency, genital sores, hematuria, penile discharge, penile pain, penile swelling, scrotal swelling, testicular pain and urgency.   Musculoskeletal:  Negative for arthralgias, back pain, joint swelling, myalgias and neck pain.   Skin:  Negative for color change, pallor, rash and wound.   Allergic/Immunologic: Negative for environmental allergies, food allergies and immunocompromised state.   Neurological:  Positive for weakness. Negative for dizziness, vertigo, seizures, numbness and headaches.   Hematological:  Negative for adenopathy. Does not bruise/bleed easily.   Psychiatric/Behavioral: Negative.       Objective:      Physical Exam  Vitals and nursing note reviewed.   Constitutional:       General: He is not in acute distress.     Appearance: Normal appearance. He is well-developed. He is not ill-appearing, toxic-appearing or diaphoretic.   HENT:      Head: Normocephalic.      Right Ear: External ear normal.      Left Ear: External ear normal.      Nose: Nose normal.      Mouth/Throat:      Mouth: Mucous membranes are moist.      Pharynx: Oropharynx is clear. No oropharyngeal exudate or posterior oropharyngeal erythema.   Eyes:      General: No scleral icterus.        Right eye: No discharge.         Left eye: No discharge.      Extraocular Movements: Extraocular movements intact.      Conjunctiva/sclera: Conjunctivae normal.      Pupils: Pupils are equal, round, and reactive  to light.   Cardiovascular:      Rate and Rhythm: Normal rate and regular rhythm.      Heart sounds: Normal heart sounds.   Pulmonary:      Effort: Pulmonary effort is normal.      Breath sounds: Normal breath sounds.   Abdominal:      General: Bowel sounds are normal.      Palpations: Abdomen is soft.      Tenderness: There is no right CVA tenderness or left CVA tenderness.   Genitourinary:     Penis: Normal.       Testes: Normal.   Musculoskeletal:         General: Normal range of motion.      Cervical back: Normal range of motion and neck supple.   Skin:     General: Skin is warm and dry.      Capillary Refill: Capillary refill takes less than 2 seconds.   Neurological:      Mental Status: He is alert and oriented to person, place, and time.      Deep Tendon Reflexes: Reflexes are normal and symmetric.   Psychiatric:         Behavior: Behavior normal.         Thought Content: Thought content normal.         Judgment: Judgment normal.       Assessment:       1. Prostate cancer metastatic to intrapelvic lymph node    2. Elevated PSA, less than 10 ng/ml    3. Personal history of malignant neoplasm of prostate          Plan:       Patient Instructions   Trelstar injection today  Repeat labs in 3 months  Follow-up 3 months for repeat injection  Refer to Dr. Mata with Radiation Oncology for evaluation to determine if patient is a candidate for radiation to his lymph nodes.  At Effexor 25 mg twice daily to help with symptoms of hot flashes

## 2023-01-04 NOTE — PATIENT INSTRUCTIONS
Trelstar injection today  Repeat labs in 3 months  Follow-up 3 months for repeat injection  Refer to Dr. Mata with Radiation Oncology for evaluation to determine if patient is a candidate for radiation to his lymph nodes.  At Effexor 25 mg twice daily to help with symptoms of hot flashes

## 2023-01-23 ENCOUNTER — OFFICE VISIT (OUTPATIENT)
Dept: RADIATION ONCOLOGY | Facility: CLINIC | Age: 58
End: 2023-01-23
Payer: MEDICAID

## 2023-01-23 VITALS
HEART RATE: 70 BPM | WEIGHT: 212.38 LBS | RESPIRATION RATE: 20 BRPM | BODY MASS INDEX: 29.73 KG/M2 | DIASTOLIC BLOOD PRESSURE: 83 MMHG | SYSTOLIC BLOOD PRESSURE: 136 MMHG | HEIGHT: 71 IN | OXYGEN SATURATION: 99 % | TEMPERATURE: 98 F

## 2023-01-23 DIAGNOSIS — R97.20 ELEVATED PSA, LESS THAN 10 NG/ML: ICD-10-CM

## 2023-01-23 DIAGNOSIS — C77.5 PROSTATE CANCER METASTATIC TO INTRAPELVIC LYMPH NODE: ICD-10-CM

## 2023-01-23 DIAGNOSIS — Z85.46 PERSONAL HISTORY OF MALIGNANT NEOPLASM OF PROSTATE: Primary | ICD-10-CM

## 2023-01-23 DIAGNOSIS — C61 PROSTATE CANCER METASTATIC TO INTRAPELVIC LYMPH NODE: ICD-10-CM

## 2023-01-23 PROCEDURE — 99205 OFFICE O/P NEW HI 60 MIN: CPT | Mod: S$PBB,,, | Performed by: STUDENT IN AN ORGANIZED HEALTH CARE EDUCATION/TRAINING PROGRAM

## 2023-01-23 PROCEDURE — 1160F PR REVIEW ALL MEDS BY PRESCRIBER/CLIN PHARMACIST DOCUMENTED: ICD-10-PCS | Mod: CPTII,,, | Performed by: STUDENT IN AN ORGANIZED HEALTH CARE EDUCATION/TRAINING PROGRAM

## 2023-01-23 PROCEDURE — 99205 PR OFFICE/OUTPT VISIT, NEW, LEVL V, 60-74 MIN: ICD-10-PCS | Mod: S$PBB,,, | Performed by: STUDENT IN AN ORGANIZED HEALTH CARE EDUCATION/TRAINING PROGRAM

## 2023-01-23 PROCEDURE — 3075F PR MOST RECENT SYSTOLIC BLOOD PRESS GE 130-139MM HG: ICD-10-PCS | Mod: CPTII,,, | Performed by: STUDENT IN AN ORGANIZED HEALTH CARE EDUCATION/TRAINING PROGRAM

## 2023-01-23 PROCEDURE — 99999 PR PBB SHADOW E&M-EST. PATIENT-LVL V: ICD-10-PCS | Mod: PBBFAC,,, | Performed by: STUDENT IN AN ORGANIZED HEALTH CARE EDUCATION/TRAINING PROGRAM

## 2023-01-23 PROCEDURE — 3079F PR MOST RECENT DIASTOLIC BLOOD PRESSURE 80-89 MM HG: ICD-10-PCS | Mod: CPTII,,, | Performed by: STUDENT IN AN ORGANIZED HEALTH CARE EDUCATION/TRAINING PROGRAM

## 2023-01-23 PROCEDURE — 3008F PR BODY MASS INDEX (BMI) DOCUMENTED: ICD-10-PCS | Mod: CPTII,,, | Performed by: STUDENT IN AN ORGANIZED HEALTH CARE EDUCATION/TRAINING PROGRAM

## 2023-01-23 PROCEDURE — 99215 OFFICE O/P EST HI 40 MIN: CPT | Mod: PBBFAC | Performed by: STUDENT IN AN ORGANIZED HEALTH CARE EDUCATION/TRAINING PROGRAM

## 2023-01-23 PROCEDURE — 3008F BODY MASS INDEX DOCD: CPT | Mod: CPTII,,, | Performed by: STUDENT IN AN ORGANIZED HEALTH CARE EDUCATION/TRAINING PROGRAM

## 2023-01-23 PROCEDURE — 99999 PR PBB SHADOW E&M-EST. PATIENT-LVL V: CPT | Mod: PBBFAC,,, | Performed by: STUDENT IN AN ORGANIZED HEALTH CARE EDUCATION/TRAINING PROGRAM

## 2023-01-23 PROCEDURE — 3075F SYST BP GE 130 - 139MM HG: CPT | Mod: CPTII,,, | Performed by: STUDENT IN AN ORGANIZED HEALTH CARE EDUCATION/TRAINING PROGRAM

## 2023-01-23 PROCEDURE — 1159F MED LIST DOCD IN RCRD: CPT | Mod: CPTII,,, | Performed by: STUDENT IN AN ORGANIZED HEALTH CARE EDUCATION/TRAINING PROGRAM

## 2023-01-23 PROCEDURE — 3079F DIAST BP 80-89 MM HG: CPT | Mod: CPTII,,, | Performed by: STUDENT IN AN ORGANIZED HEALTH CARE EDUCATION/TRAINING PROGRAM

## 2023-01-23 PROCEDURE — 1159F PR MEDICATION LIST DOCUMENTED IN MEDICAL RECORD: ICD-10-PCS | Mod: CPTII,,, | Performed by: STUDENT IN AN ORGANIZED HEALTH CARE EDUCATION/TRAINING PROGRAM

## 2023-01-23 PROCEDURE — 1160F RVW MEDS BY RX/DR IN RCRD: CPT | Mod: CPTII,,, | Performed by: STUDENT IN AN ORGANIZED HEALTH CARE EDUCATION/TRAINING PROGRAM

## 2023-01-23 NOTE — PROGRESS NOTES
"Radiation Oncology Consult Note         Date of Service: 01/23/2023    Chief Complaint: prostate cancer     Reason for visit: consideration for radiation to the pelvis     Referring Physician: Dr Edmond (urology)     Implantable devices: denies    Therapy to Date:  salvage/adjuvant EBRT (likely prostate bed alone, no records available yet)   Dr. Natanael Mosher MD  [phone]  [facility]  [address], [place] [address]    Diagnosis/Assessment:   Mauro Benitez is a 57 y.o. man with history of prostate cancer s/p RP and salvage/adjuvant EBRT (likely prostate bed alone, no records available yet), with biochemical peristence, who established care in [facility] with Dr Edmond now with pelvic node metastases on PET PSMA up to above the common iliac vessels, below the renal pelvises, now on ADT    PMH of T2DM, urinary incontinence on oxybutynin    ECOG 1    Plan   We discussed treatment options per NCCN guidelines v2023:  - ADT +/- salvage EBRT       - referral to [facility] for intensified ADT  - referral to genetics (young age)  - we will present at next week's tumor board  - provided Kegel exercise pamphlets  - Epic- 26 survey filled  - follow-up with Dr Edmond  - audio follow-up in 3 weeks    HPI:   Mauro Benitez is a 57 y.o. man with diagnosis of prostate cancer.    Oncologic history:  6/6/2022 PSA 2.8    6/6/2022 established care with Dr Edmond and Janusz Quiroz NP  "hx of prostate cancer with prostatectomy in 2015 in [place] ([facility]) as reported by patient. He reports going to [facility] for evaluation, but was told there's nothing else they can do for him"      6/21/2022 PET PSMA showed tracer-avid left para-aortic, presacral/left common iliac, and left external iliac chain nodes         12/5/2022 upper EGD Stomach, antrum, biopsy: Mild chronic gastritis    12/9/2022 PSA 0.02    1/4/2023 last visit with Dr Edmond    Subjective:   In clinic the patient is accompanied by his " girlfriend Gretel     The patient reports feeling OK overall. He reports urinary incontinence and takes oxybutynin which causes dry mouth  The patient also reports ED  The patient denies other major complaints    AUA score 18 (4,4,3,2,2,3) mostly dissatisfied  ROSEMARY score 18 (2,5,5,5,1)      The patient denies any history of implantable cardiac devices, or connective tissue disease.    Social history  He lives with Gretel in [facility]. They both have children from prior relationships.  He has a son age 31 and a daughter age 35  He works at [employer] x 3 years.  Active smoker  Cell [phone]      Family history  Father  of prostate cancer    Current Outpatient Medications on File Prior to Visit   Medication Sig Dispense Refill    amLODIPine (NORVASC) 10 MG tablet Take 10 mg by mouth every evening.      aspirin (ECOTRIN) 81 MG EC tablet Aspir-81 mg tablet,delayed release   Take 1 tablet every day by oral route in the morning for 30 days.      atorvastatin (LIPITOR) 20 MG tablet Take 20 mg by mouth once daily.      carvedilol (COREG) 12.5 MG tablet Take 12.5 mg by mouth 2 (two) times daily with meals.      cyproheptadine (PERIACTIN) 4 mg tablet Take by mouth.      gabapentin (NEURONTIN) 400 MG capsule Take 400 mg by mouth 3 (three) times daily.      loratadine (CLARITIN) 10 mg tablet loratadine 10 mg tablet      losartan (COZAAR) 100 MG tablet Take 100 mg by mouth once daily.      metFORMIN (GLUCOPHAGE) 500 MG tablet Take 500 mg by mouth 2 (two) times daily with meals.      oxybutynin (DITROPAN-XL) 10 MG 24 hr tablet oxybutynin chloride ER 10 mg tablet,extended release 24 hr   TAKE 1 TABLET BY MOUTH DAILY      venlafaxine (EFFEXOR) 25 MG Tab Take 1 tablet (25 mg total) by mouth 2 (two) times daily. 60 tablet 11    acetaminophen (TYLENOL) 500 MG tablet Take 1,500 mg by mouth daily as needed for Pain.      bicalutamide (CASODEX) 50 MG Tab Take 1 tablet (50 mg total) by mouth once daily. 30  tablet 11    blood sugar diagnostic (ONETOUCH VERIO TEST STRIPS MISC) OneTouch Verio test strips      cyclobenzaprine (FLEXERIL) 5 MG tablet cyclobenzaprine 5 mg tablet   TAKE 1 TABLET BY MOUTH THREE TIMES DAILY FOR MUSCLE SPASMS FOR UP TO 10 DAYS      fluticasone propionate (FLONASE) 50 mcg/actuation nasal spray fluticasone propionate 50 mcg/actuation nasal spray,suspension   SPRAY 2 SPRAYS EVERY DAY BY INTRANASAL ROUTE AS NEEDED.      gabapentin (NEURONTIN) 300 MG capsule Take 300 mg by mouth 3 (three) times daily.  5    omeprazole (PRILOSEC) 20 MG capsule omeprazole 20 mg capsule,delayed release   TAKE 1 TABLET BY MOUTH EVERY DAY      polyethylene glycol (GLYCOLAX) 17 gram/dose powder Take 17 g by mouth once daily. (Patient not taking: Reported on 1/23/2023) 510 g 11    prochlorperazine (COMPAZINE) 10 MG tablet Take 10 mg by mouth every 8 (eight) hours as needed.      sildenafiL (VIAGRA) 100 MG tablet Take 100 mg by mouth daily as needed for Erectile Dysfunction.       Current Facility-Administered Medications on File Prior to Visit   Medication Dose Route Frequency Provider Last Rate Last Admin    triptorelin pamoate SusR 11.25 mg  11.25 mg Intramuscular 1 time in Clinic/HOD Edward Edmond MD             Review of patient's allergies indicates:   Allergen Reactions    Lisinopril Shortness Of Breath           Past Surgical History:   Procedure Laterality Date    ESOPHAGOGASTRODUODENOSCOPY N/A 12/5/2022    Procedure: EGD (ESOPHAGOGASTRODUODENOSCOPY);  Surgeon: Beronica Valera MD;  [facility];  Service: Endoscopy;  Laterality: N/A;    PROSTATECTOMY         Past Medical History:   Diagnosis Date    Diabetes mellitus     GERD (gastroesophageal reflux disease)     Hypertension     Prostate cancer      Review of Systems   Constitutional:  Positive for unexpected weight change (gain). Negative for fatigue and fever.   HENT:  Positive for nasal congestion and hearing loss. Negative for sinus  pressure/congestion, sore throat and tinnitus.    Eyes:  Negative for visual disturbance.   Respiratory:  Positive for cough. Negative for shortness of breath and wheezing.    Cardiovascular:  Negative for chest pain, palpitations and leg swelling.   Gastrointestinal:  Positive for abdominal pain, nausea and vomiting. Negative for blood in stool, constipation, diarrhea and reflux.   Genitourinary:  Positive for erectile dysfunction, frequency and urgency. Negative for dysuria and hematuria.   Musculoskeletal:  Positive for back pain. Negative for arthralgias and neck pain.   Integumentary:  Negative for rash.   Neurological:  Positive for dizziness and memory loss. Negative for seizures, speech difficulty, weakness, numbness and headaches.   Psychiatric/Behavioral:  Negative for dysphoric mood. The patient is not nervous/anxious.        Objective:      Physical Exam  Vitals reviewed.     Constitutional:       Appearance: Normal appearance.   HENT:      Head: Normocephalic and atraumatic.   Eyes:      Conjunctiva/sclera: Conjunctivae normal.   Pulmonary:      Effort: Pulmonary effort is normal.   Abdominal:      General: There is no distension.   Genitourinary:     Comments: LETITIA deferred  Musculoskeletal:         General: Normal range of motion.  Neurological:      General: No focal deficit present.      Mental Status: Alert and oriented  Psychiatric:         Mood and Affect: Mood normal.         Behavior: Behavior normal.      Imaging: I have personally reviewed the patient's available images and reports and summarized pertinent findings above in HPI.      Laboratory: I have personally reviewed the patient's available laboratory values and summarized pertinent findings above in HPI.      I spent approximately 60 minutes reviewing the available records and evaluating the patient, out of which over 50% of the time was spent face to face with the patient in counseling and coordinating this patient's care.     Thank you  for the opportunity to care for this patient. Please do not hesitate to contact me with any questions.     Wally Kerns MD/PhD

## 2023-01-24 ENCOUNTER — PATIENT MESSAGE (OUTPATIENT)
Dept: HEMATOLOGY/ONCOLOGY | Facility: CLINIC | Age: 58
End: 2023-01-24
Payer: MEDICAID

## 2023-01-24 ENCOUNTER — TELEPHONE (OUTPATIENT)
Dept: HEMATOLOGY/ONCOLOGY | Facility: CLINIC | Age: 58
End: 2023-01-24
Payer: MEDICAID

## 2023-01-24 NOTE — TELEPHONE ENCOUNTER
Attempted to contact patient regarding referral to medical oncology, no answer and no voicemail set up.

## 2023-01-24 NOTE — TELEPHONE ENCOUNTER
----- Message from Wally Kerns MD sent at 1/23/2023  2:20 PM CST -----  Mert Ann would it be possible to have patient [facility] by [facility] for [facility]T?  Thanks  Wally

## 2023-01-26 ENCOUNTER — TELEPHONE (OUTPATIENT)
Dept: HEMATOLOGY/ONCOLOGY | Facility: CLINIC | Age: 58
End: 2023-01-26
Payer: MEDICAID

## 2023-01-26 NOTE — TELEPHONE ENCOUNTER
Oncology nurse navigator contacted patient for scheduling medical oncology referral placed by Dr. Kerns. Patient agreed to appointment on 2/1-8am. Date, time, and location discussed with patient. All questions/concerns addressed. Patient verbalized understanding. Nurse navigator contact information reviewed for further assistance.

## 2023-01-31 ENCOUNTER — TUMOR BOARD CONFERENCE (OUTPATIENT)
Dept: UROLOGY | Facility: HOSPITAL | Age: 58
End: 2023-01-31
Payer: MEDICAID

## 2023-01-31 PROBLEM — C61 PROSTATE CANCER: Status: ACTIVE | Noted: 2019-04-25

## 2023-01-31 NOTE — PROGRESS NOTES
MEDICAL ONCOLOGY - NEW PATIENT VISIT    Best Contact Phone Number(s): [phone] (home)      Cancer/Stage/TNM:    Cancer Staging   Prostate cancer  Staging form: Prostate, AJCC 8th Edition  - Clinical: Stage IVB (cTX, cN1, cM1a, PSA: 2.8, Grade Group: 3) - Signed by Edward Zuleta MD on 2/1/2023       Reason for visit: Mr. Benitez is a 57 year old man with history of prostate cancer sp RALP with positive margins 11/2/2015. He was subsequently found to have PSA relapse up to 2.8 06/2022 with maren relapse by PSMA imaging and subsequently started hormonal therapy 10/2022. He presents to medical oncology clinic for initial consultation. Recent medical course notable for recurrent nausea and vomiting thought due to gastroparesis.    History has been obtained by chart review and discussion with the patient.    HPI:     Patient has long standing overflow incontinence since his surgery in 2015. He also has ongoing and recurrent episodes of abdominal pain, nausea, and vomiting which occur irregularly every few months oftentimes requiring ED visits. Flares last up to a week or so. Feels well in between episodes. Had a recent URTI with ongoing congestion and productive cough. No fevers.  Saw urgent care last Friday.  Otherwise feels generally well. No significant pain. No CP, SOB or cough.  Reports rare episodes of typical angina - develops chest discomfort with exertion at work which recovers with rest. No prior cardiac workup. Also reports signficant depression and distress primarily focused on his erectile dysfunction which has been present since prostatectomy. No prior effect from sildenafil and intracorpreal injections.    Oncology History   Prostate cancer   10/12/2015 Imaging Significant Findings    CT a/p: No evidence of distant or maren metastatic disease    Bone scan: No evidence of metastatic disease. 1cm rounded area of uptake along left superomedial orbit/sphenoid thought possibly calcified menigioma.      11/2/2015 Surgery    Radical prostatectomy at [facility] in [place]. Pathology with prostate adenocarcinoma up to Jagdeep 4+3 with associated PNI. Positive margin. 0/4 LN involved. Staged [id number]     2/23/2016 Imaging Significant Findings    Nuclear bone scan: No evidence of metastatic disease. Note made of uptake in left supraorbital region thought c/w left frontal sinusitis along with uptake in mandible and maxilla c/w periosteal disease.     4/25/2019 Initial Diagnosis    Prostate cancer     4/27/2021 Imaging Significant Findings    Bone scan:  There is abnormal increased radiotracer activity identified involving the maxilla and multiple paranasal sinuses likely related to periodontal disease and sinus disease. Recommend clinical correlation and further evaluation with maxillofacial CT as warranted.     6/6/2022 Tumor Markers    PSA 2.8     6/21/2022 Imaging Significant Findings    PSMA PET-CT: In this patient with prostate malignancy status post prostatectomy, there are tracer-avid left para-aortic, presacral/left common iliac, and left external iliac chain nodes as described above, concerning for recurrent metastatic disease.     9/16/2022 -  Hormone Therapy    Start bicalutamide 50mg po daily     10/3/2022 -  Hormone Therapy    Trelstar 11.25mg IM x1     12/19/2022 Tumor Markers    PSA 0.02     1/4/2023 -  Hormone Therapy    Trelstart 11.25mg IM x1 with Dr. Edmond     2/1/2023 Cancer Staged    Staging form: Prostate, AJCC 8th Edition  - Clinical: Stage IVB (cTX, cN1, cM1a, PSA: 2.8, Grade Group: 3)       2/1/2023 -  Chemotherapy    Treatment Summary   Plan Name: OP ABIRATERONE DAILY PREDNISONE BID  Treatment Goal: Curative  Status: Active  Start Date: 2/1/2023  End Date: 2/1/2023  Provider: Edward Zuleta MD  Chemotherapy: abiraterone (ZYTIGA) 250 mg Tab, 250 mg (100 % of original dose 250 mg), Oral, Daily, 1 of 1 cycle, Start date: 2/1/2023, End date: --  Dose modification: 250 mg  (original dose 250 mg, Cycle 1)                Past Medical History:   Diagnosis Date    Diabetes mellitus     GERD (gastroesophageal reflux disease)     Hypertension     Prostate cancer         Past Surgical History:   Procedure Laterality Date    ESOPHAGOGASTRODUODENOSCOPY N/A 12/5/2022    Procedure: EGD (ESOPHAGOGASTRODUODENOSCOPY);  Surgeon: Beronica Valera MD;  Location: [facility];  Service: Endoscopy;  Laterality: N/A;    PROSTATECTOMY          Family History   Problem Relation Age of Onset    Prostate cancer Father     Pancreatic cancer Neg Hx     Breast cancer Neg Hx         Social History     Tobacco Use    Smoking status: Every Day     Types: Cigars    Smokeless tobacco: Never    Tobacco comments:     2-3 cigars/day   Substance Use Topics    Alcohol use: Yes     Comment: Occaisionally throughout the week        I have reviewed and updated the patient's past medical, surgical, family and social histories.    Review of patient's allergies indicates:   Allergen Reactions    Lisinopril Shortness Of Breath        Current Outpatient Medications   Medication Sig Dispense Refill    abiraterone (ZYTIGA) 250 mg Tab Take 1 tablet (250 mg total) by mouth once daily Take as directed with a low fat meal. 30 tablet 11    acetaminophen (TYLENOL) 500 MG tablet Take 1,500 mg by mouth daily as needed for Pain.      amLODIPine (NORVASC) 10 MG tablet Take 10 mg by mouth every evening.      aspirin (ECOTRIN) 81 MG EC tablet Aspir-81 mg tablet,delayed release   Take 1 tablet every day by oral route in the morning for 30 days.      atorvastatin (LIPITOR) 20 MG tablet Take 20 mg by mouth once daily.      blood sugar diagnostic (ONETOUCH VERIO TEST STRIPS MISC) OneTouch Verio test strips      calcium carbonate (OS-CALOS) 500 mg calcium (1,250 mg) tablet Take 2 tablets (1,000 mg total) by mouth once daily. 60 tablet 11    carvedilol (COREG) 12.5 MG tablet Take 12.5 mg by mouth 2 (two) times daily with meals.      cholecalciferol,  "vitamin D3, (VITAMIN D3) 25 mcg (1,000 unit) capsule Take 1 capsule (1,000 Units total) by mouth once daily. 30 capsule 11    cyclobenzaprine (FLEXERIL) 5 MG tablet cyclobenzaprine 5 mg tablet   TAKE 1 TABLET BY MOUTH THREE TIMES DAILY FOR MUSCLE SPASMS FOR UP TO 10 DAYS      cyproheptadine (PERIACTIN) 4 mg tablet Take by mouth.      fluticasone propionate (FLONASE) 50 mcg/actuation nasal spray fluticasone propionate 50 mcg/actuation nasal spray,suspension   SPRAY 2 SPRAYS EVERY DAY BY INTRANASAL ROUTE AS NEEDED.      gabapentin (NEURONTIN) 400 MG capsule Take 400 mg by mouth 3 (three) times daily.      loratadine (CLARITIN) 10 mg tablet loratadine 10 mg tablet      losartan (COZAAR) 100 MG tablet Take 100 mg by mouth once daily.      metFORMIN (GLUCOPHAGE) 500 MG tablet Take 500 mg by mouth 2 (two) times daily with meals.      omeprazole (PRILOSEC) 20 MG capsule omeprazole 20 mg capsule,delayed release   TAKE 1 TABLET BY MOUTH EVERY DAY      oxybutynin (DITROPAN-XL) 10 MG 24 hr tablet oxybutynin chloride ER 10 mg tablet,extended release 24 hr   TAKE 1 TABLET BY MOUTH DAILY      predniSONE (DELTASONE) 5 MG tablet Take 1 tablet (5 mg total) by mouth once daily. Take as directed with water on an empty stomach. 30 tablet 11    sildenafiL (VIAGRA) 100 MG tablet Take 100 mg by mouth daily as needed for Erectile Dysfunction.      venlafaxine (EFFEXOR) 25 MG Tab Take 1 tablet (25 mg total) by mouth 2 (two) times daily. 60 tablet 11     Current Facility-Administered Medications   Medication Dose Route Frequency Provider Last Rate Last Admin    triptorelin pamoate SusR 11.25 mg  11.25 mg Intramuscular 1 time in Clinic/HOD Edward Edmond MD            Physical Exam:   /77 (BP Location: Left arm, Patient Position: Sitting, BP Method: Medium (Automatic))   Pulse 82   Temp 98 °F (36.7 °C) (Oral)   Resp 18   Ht 5' 11" (1.803 m)   Wt 97 kg (213 lb 13.5 oz)   SpO2 99%   BMI 29.83 kg/m²      ECOG Performance status: " 0            Physical Exam  Constitutional:       General: He is not in acute distress.     Appearance: Normal appearance.   HENT:      Head: Normocephalic.      Mouth/Throat:      Mouth: Mucous membranes are moist.      Pharynx: Oropharynx is clear. No oropharyngeal exudate or posterior oropharyngeal erythema.   Eyes:      General: No scleral icterus.     Extraocular Movements: Extraocular movements intact.      Conjunctiva/sclera: Conjunctivae normal.      Pupils: Pupils are equal, round, and reactive to light.   Cardiovascular:      Rate and Rhythm: Normal rate and regular rhythm.      Heart sounds: No murmur heard.  Pulmonary:      Effort: Pulmonary effort is normal. No respiratory distress.      Breath sounds: Normal breath sounds. No wheezing.   Abdominal:      General: There is no distension.      Palpations: Abdomen is soft.   Musculoskeletal:         General: No swelling. Normal range of motion.      Cervical back: Normal range of motion and neck supple.      Right lower leg: No edema.      Left lower leg: No edema.   Lymphadenopathy:      Cervical: No cervical adenopathy.   Skin:     General: Skin is warm and dry.      Coloration: Skin is not jaundiced.   Neurological:      General: No focal deficit present.      Mental Status: He is alert and oriented to person, place, and time.      Motor: No weakness.   Psychiatric:         Mood and Affect: Mood normal.         Behavior: Behavior normal.         Thought Content: Thought content normal.         Labs:   Lab Results   Component Value Date     09/19/2021    K 3.9 09/19/2021    CREATININE 1.13 09/19/2021    WBC 7.76 09/19/2021    HGB 12.5 (L) 09/19/2021     09/19/2021    AST 67 (H) 09/19/2021    ALT 26 09/19/2021    ALKPHOS 76 09/19/2021    BILITOT 1.6 (H) 09/19/2021      Lab Results   Component Value Date    PSATOTAL 0.02 12/19/2022         Imaging:         I have personally reviewed the imaging including PSMA PET CT 6/21/22    Path:   Reviewed  pathology as documented in oncology history      Assessment and Plan:   1. Prostate cancer  Overview:  Intermediate risk Jagdeep 4+3 disease sp R1 prostatectomy 2015 with adjuvant RT presumably to prostate bed. Now with PSA recurrence and PSMA imaging with regional maren metastases and limited intraabdominal maren mets. Plan to start intensified hormonal therapy with ADT + abiraterone with reconsideration of RT.    Assessment & Plan:  - SP Trelstar 11.25mg IM 1/4/23; next 3/29/23 - receiving through Dr. Edmond's office  - Start abiraterone 250mg po daily with low fat meal and prednisone 5mg po daily; tentative start 2/15/23  - Safety labs today and q2 weeks after starting AAP (first 3/1/23)  - FU Dr. Zuleta 3/15/23    - Start Ca/D; schedule bone mineral density test  - Referral to cancer genetics    - Follow up with radiation oncology in 3-6 months    Orders:  -     Ambulatory referral/consult to Oncology  -     Ambulatory Referral/Consult to Hematology Oncology Behavioral Health; Future; Expected date: 02/08/2023  -     calcium carbonate (OS-CALOS) 500 mg calcium (1,250 mg) tablet; Take 2 tablets (1,000 mg total) by mouth once daily.  Dispense: 60 tablet; Refill: 11  -     cholecalciferol, vitamin D3, (VITAMIN D3) 25 mcg (1,000 unit) capsule; Take 1 capsule (1,000 Units total) by mouth once daily.  Dispense: 30 capsule; Refill: 11  -     DXA Bone Density with Vertebral Fracture; Future; Expected date: 02/01/2023  -     Ambulatory referral/consult to Genetics; Future; Expected date: 02/08/2023  -     Physician communication order; Standing  -     Physician communication order; Standing  -     Physician communication order; Standing  -     predniSONE (DELTASONE) 5 MG tablet; Take 1 tablet (5 mg total) by mouth once daily. Take as directed with water on an empty stomach.  Dispense: 30 tablet; Refill: 11  -     abiraterone (ZYTIGA) 250 mg Tab; Take 1 tablet (250 mg total) by mouth once daily Take as directed with a low  fat meal.  Dispense: 30 tablet; Refill: 11  -     CBC Oncology; Standing  -     Comprehensive Metabolic Panel; Standing  -     Prostate Specific Antigen, Diagnostic; Standing    2. Type 2 diabetes mellitus with diabetic polyneuropathy, without long-term current use of insulin  Overview:  Home medications include metformin. No insulin use. Complicated by peripheral neuropathy and potentially gastroparesis.    Assessment & Plan:  - Most recent A1c 6.1 in 09/2021  - Managed by PCP Dr. Roberts      3. Hypertension, unspecified type  Overview:  Home medications include carvedilol, amlodipine, and losartan.     Assessment & Plan:  - Con't home medications      4. Diabetic peripheral neuropathy  Overview:  Home medications include gabapentin    Assessment & Plan:  - Continue home medication      5. Gastroesophageal reflux disease, unspecified whether esophagitis present  Overview:  Home medications include omeprazole    Assessment & Plan:  - Con't home medications      6. Androgen deprivation therapy  -     DXA Bone Density with Vertebral Fracture; Future; Expected date: 02/01/2023    7. Chest pain, unspecified type  Assessment & Plan:  Sounds consistent with typical angina. No known prior cardiac history.    - Continue ASA and statin  - Referral to cardiology    Orders:  -     Ambulatory referral/consult to Cardiology; Future; Expected date: 02/08/2023    8. Gastroparesis due to DM  Overview:  Episodes of intractable N/V and abdominal pain every few months thought due to gastroparesis +/- cannibas hyperemesis.    Assessment & Plan:  - Currently stable      9. Depression, unspecified depression type  Assessment & Plan:  - Referral to oncology psychology  - Currently on venlafaxine               Follow up:   Route Chart for Scheduling    Med Onc Chart Routing      Follow up with physician . ZAINAB Zuleta 3/15/23   Follow up with DONAVAN    Infusion scheduling note    Injection scheduling note    Labs CBC, CMP and PSA   Lab  interval: every 2 weeks  Starting 3/1/23   Imaging DXA scan      Pharmacy appointment    Other referrals        Treatment Plan Information   OP ABIRATERONE DAILY PREDNISONE BID   Edward Zuleta MD   Upcoming Treatment Dates - OP ABIRATERONE DAILY PREDNISONE BID    No upcoming days in selected categories.        The above information has been reviewed with the patient and all questions have been answered to their apparent satisfaction.  They understand that they can call the clinic with any questions.    Edward Zuleta MD  Hematology/Oncology  [facility]

## 2023-01-31 NOTE — PROGRESS NOTES
Presenting Hospital / Clinic: [facility]  Virtual Tumor Board Conference: In person  Presenter: Chriss Palomares MD  Date Presented to Tumor Board: 01/31/23  Specialties Present: Medical Oncology; Radiation Oncology  Presentation at Cancer Conference: Prospective  Cancer Type: Prostate cancer  Recommended Plan Note: Do not need repeat PSMA. Needs EBRT with boost to 3 nodes with continued ADT    [facility]      GENITOURINARY MULTIDISCIPLINARY TUMOR BOARD  PATIENT REVIEW FORM     CLINIC #: [id number]  DATE: 01/31/2023    TUMOR SITE:   Prostate cancer    ATTENDING:   Merlyn Kerns ()    PATIENT SUMMARY:   Mauro Benitez is a 57 y.o. male with a history of prostate cancer s/p RRP in 2015 with adjuvant XRT. Lost to f/u. Biochemical recurrence. PSMA revealed paraortic node, presacral, and iliac chain node positive. Trelstar started. PSA 0.02.    DISCUSSION:  Based on para-aortic node, treating as M1 disease. No need for PSMA. ADT and EBRT    PERFORMANCE STATUS:  ECOG 1    Estimated GFR/CKD Stage: 60 (Cr 1.2)    Clinical/Pathologic Stage (TNM): [id number]   FACULTY IN ATTENDANCE:    Urologic Oncology: Kanu Bruce MD; Edmar Prado MD, Sebastian Zapien MD    Radiation Oncology: Jagdish Mata MD; Wally Kerns MD    Hematology/Oncology: Edward Zuleta MD      CONSULT NEEDED:     [] Urologic Oncology    [] Hem/Onc    [] Rad/Onc   []     [] Physical/Occupational Therapy    [] Psychology  [] Other: ___________________    [x] Treatment Guidelines (NCCN and AUA) reviewed and care planned is consistent with guidelines.    PRESENTATION AT CANCER CONFERENCE:         [x] Prospective    [] Retrospective     [] Follow-Up          [] Eligible for clinical trial    TUMOR BOARD RECOMMENDATIONS/PLAN/CONSENSUS:     Case discussed among group. Pathology and radiologic images were reviewed (if applicable).    EBRT with boost to positive nodes. Continue ADT. No need for repeat PSMA at this time.

## 2023-02-01 ENCOUNTER — OFFICE VISIT (OUTPATIENT)
Dept: HEMATOLOGY/ONCOLOGY | Facility: CLINIC | Age: 58
End: 2023-02-01
Payer: MEDICAID

## 2023-02-01 ENCOUNTER — LAB VISIT (OUTPATIENT)
Dept: LAB | Facility: HOSPITAL | Age: 58
End: 2023-02-01
Attending: HOSPITALIST
Payer: MEDICAID

## 2023-02-01 VITALS
HEIGHT: 71 IN | HEART RATE: 82 BPM | OXYGEN SATURATION: 99 % | TEMPERATURE: 98 F | DIASTOLIC BLOOD PRESSURE: 77 MMHG | RESPIRATION RATE: 18 BRPM | WEIGHT: 213.88 LBS | BODY MASS INDEX: 29.94 KG/M2 | SYSTOLIC BLOOD PRESSURE: 139 MMHG

## 2023-02-01 DIAGNOSIS — Z79.818 ANDROGEN DEPRIVATION THERAPY: ICD-10-CM

## 2023-02-01 DIAGNOSIS — I10 HYPERTENSION, UNSPECIFIED TYPE: ICD-10-CM

## 2023-02-01 DIAGNOSIS — E11.43 GASTROPARESIS DUE TO DM: ICD-10-CM

## 2023-02-01 DIAGNOSIS — F32.A DEPRESSION, UNSPECIFIED DEPRESSION TYPE: ICD-10-CM

## 2023-02-01 DIAGNOSIS — E11.42 TYPE 2 DIABETES MELLITUS WITH DIABETIC POLYNEUROPATHY, WITHOUT LONG-TERM CURRENT USE OF INSULIN: ICD-10-CM

## 2023-02-01 DIAGNOSIS — R07.9 CHEST PAIN, UNSPECIFIED TYPE: ICD-10-CM

## 2023-02-01 DIAGNOSIS — K21.9 GASTROESOPHAGEAL REFLUX DISEASE, UNSPECIFIED WHETHER ESOPHAGITIS PRESENT: ICD-10-CM

## 2023-02-01 DIAGNOSIS — C61 PROSTATE CANCER: Primary | ICD-10-CM

## 2023-02-01 DIAGNOSIS — C61 PROSTATE CANCER: ICD-10-CM

## 2023-02-01 DIAGNOSIS — K31.84 GASTROPARESIS DUE TO DM: ICD-10-CM

## 2023-02-01 DIAGNOSIS — E11.42 DIABETIC PERIPHERAL NEUROPATHY: ICD-10-CM

## 2023-02-01 PROBLEM — R10.84 GENERALIZED ABDOMINAL PAIN: Status: RESOLVED | Noted: 2022-10-31 | Resolved: 2023-02-01

## 2023-02-01 LAB
ALBUMIN SERPL BCP-MCNC: 3.7 G/DL (ref 3.5–5.2)
ALP SERPL-CCNC: 103 U/L (ref 55–135)
ALT SERPL W/O P-5'-P-CCNC: 26 U/L (ref 10–44)
ANION GAP SERPL CALC-SCNC: 7 MMOL/L (ref 8–16)
AST SERPL-CCNC: 18 U/L (ref 10–40)
BILIRUB SERPL-MCNC: 0.3 MG/DL (ref 0.1–1)
BUN SERPL-MCNC: 18 MG/DL (ref 6–20)
CALCIUM SERPL-MCNC: 9.4 MG/DL (ref 8.7–10.5)
CHLORIDE SERPL-SCNC: 108 MMOL/L (ref 95–110)
CO2 SERPL-SCNC: 22 MMOL/L (ref 23–29)
COMPLEXED PSA SERPL-MCNC: <0.01 NG/ML (ref 0–4)
CREAT SERPL-MCNC: 1.3 MG/DL (ref 0.5–1.4)
ERYTHROCYTE [DISTWIDTH] IN BLOOD BY AUTOMATED COUNT: 13.6 % (ref 11.5–14.5)
EST. GFR  (NO RACE VARIABLE): >60 ML/MIN/1.73 M^2
GLUCOSE SERPL-MCNC: 177 MG/DL (ref 70–110)
HCT VFR BLD AUTO: 36.1 % (ref 40–54)
HGB BLD-MCNC: 10.7 G/DL (ref 14–18)
IMM GRANULOCYTES # BLD AUTO: 0.02 K/UL (ref 0–0.04)
MCH RBC QN AUTO: 29.2 PG (ref 27–31)
MCHC RBC AUTO-ENTMCNC: 29.6 G/DL (ref 32–36)
MCV RBC AUTO: 98 FL (ref 82–98)
NEUTROPHILS # BLD AUTO: 4.5 K/UL (ref 1.8–7.7)
PLATELET # BLD AUTO: 382 K/UL (ref 150–450)
PMV BLD AUTO: 9.7 FL (ref 9.2–12.9)
POTASSIUM SERPL-SCNC: 5.1 MMOL/L (ref 3.5–5.1)
PROT SERPL-MCNC: 7.4 G/DL (ref 6–8.4)
RBC # BLD AUTO: 3.67 M/UL (ref 4.6–6.2)
SODIUM SERPL-SCNC: 137 MMOL/L (ref 136–145)
WBC # BLD AUTO: 7.48 K/UL (ref 3.9–12.7)

## 2023-02-01 PROCEDURE — 3075F SYST BP GE 130 - 139MM HG: CPT | Mod: CPTII,,, | Performed by: HOSPITALIST

## 2023-02-01 PROCEDURE — 3078F PR MOST RECENT DIASTOLIC BLOOD PRESSURE < 80 MM HG: ICD-10-PCS | Mod: CPTII,,, | Performed by: HOSPITALIST

## 2023-02-01 PROCEDURE — 3075F PR MOST RECENT SYSTOLIC BLOOD PRESS GE 130-139MM HG: ICD-10-PCS | Mod: CPTII,,, | Performed by: HOSPITALIST

## 2023-02-01 PROCEDURE — 80053 COMPREHEN METABOLIC PANEL: CPT | Performed by: HOSPITALIST

## 2023-02-01 PROCEDURE — 36415 COLL VENOUS BLD VENIPUNCTURE: CPT | Performed by: HOSPITALIST

## 2023-02-01 PROCEDURE — 3008F PR BODY MASS INDEX (BMI) DOCUMENTED: ICD-10-PCS | Mod: CPTII,,, | Performed by: HOSPITALIST

## 2023-02-01 PROCEDURE — 3008F BODY MASS INDEX DOCD: CPT | Mod: CPTII,,, | Performed by: HOSPITALIST

## 2023-02-01 PROCEDURE — 84153 ASSAY OF PSA TOTAL: CPT | Performed by: HOSPITALIST

## 2023-02-01 PROCEDURE — 99999 PR PBB SHADOW E&M-EST. PATIENT-LVL V: ICD-10-PCS | Mod: PBBFAC,,, | Performed by: HOSPITALIST

## 2023-02-01 PROCEDURE — 99999 PR PBB SHADOW E&M-EST. PATIENT-LVL V: CPT | Mod: PBBFAC,,, | Performed by: HOSPITALIST

## 2023-02-01 PROCEDURE — 99205 OFFICE O/P NEW HI 60 MIN: CPT | Mod: S$PBB,,, | Performed by: HOSPITALIST

## 2023-02-01 PROCEDURE — 3078F DIAST BP <80 MM HG: CPT | Mod: CPTII,,, | Performed by: HOSPITALIST

## 2023-02-01 PROCEDURE — 85027 COMPLETE CBC AUTOMATED: CPT | Performed by: HOSPITALIST

## 2023-02-01 PROCEDURE — 99205 PR OFFICE/OUTPT VISIT, NEW, LEVL V, 60-74 MIN: ICD-10-PCS | Mod: S$PBB,,, | Performed by: HOSPITALIST

## 2023-02-01 PROCEDURE — 99215 OFFICE O/P EST HI 40 MIN: CPT | Mod: PBBFAC | Performed by: HOSPITALIST

## 2023-02-01 RX ORDER — CALCIUM CARBONATE 500(1250)
2 TABLET ORAL DAILY
Qty: 60 TABLET | Refills: 11 | Status: SHIPPED | OUTPATIENT
Start: 2023-02-01 | End: 2023-09-08 | Stop reason: SDUPTHER

## 2023-02-01 RX ORDER — ABIRATERONE ACETATE 250 MG/1
250 TABLET ORAL DAILY
Qty: 30 TABLET | Refills: 11 | Status: SHIPPED | OUTPATIENT
Start: 2023-02-01 | End: 2024-02-19 | Stop reason: SDUPTHER

## 2023-02-01 RX ORDER — PREDNISONE 5 MG/1
5 TABLET ORAL DAILY
Qty: 30 TABLET | Refills: 11 | Status: SHIPPED | OUTPATIENT
Start: 2023-02-01 | End: 2024-02-19 | Stop reason: SDUPTHER

## 2023-02-01 RX ORDER — VIT C/E/ZN/COPPR/LUTEIN/ZEAXAN 250MG-90MG
1000 CAPSULE ORAL DAILY
Qty: 30 CAPSULE | Refills: 11 | Status: SHIPPED | OUTPATIENT
Start: 2023-02-01

## 2023-02-01 NOTE — PATIENT INSTRUCTIONS
Based on PSA levels and the PSMA-PET CT scan you have recurrent prostate cancer that has spread ('metastasized') to the lymph nodes in your pelvis and lower abdomen. Traditionally management for metastatic prostate cancer like this is indefinite hormone therapy. Because your metastatic nodes may be radiated we can hopefully deliver a full dose of radiation to the involved areas combined with 2-3 years of hormone therapy and consider coming off treatment in a few years.    I recommend continuing hormone therapy with ADT shots every three months. Additionally recommend starting a new prostate cancer pill called abiraterone with prednisone.    - Next hormone shot ~ 3/29/23 with Dr. Edmond. If problematic we could try to do the shot here as well  - Start abiraterone 250mg by mouth once daily with a low fat meal and prednisone 5mg daily  - Our specialty pharmacy will call you to help arrange delivery in the next few weeks; plan to start on 2/15/23  - You will need labwork every 2 weeks after starting abiraterone for a few months, and will need to see me every 4 weeks after starting    - Start calcium and vitamin D supplementation. RX called in to your local pharmacy  - Schedule bone mineral density test to look for osteoporosis    - Referral to cancer genetics to screen for high risk gene    - Referral to oncology psychology  - Referral to cardiology regarding intermittent chest discomfort

## 2023-02-01 NOTE — ASSESSMENT & PLAN NOTE
Sounds consistent with typical angina. No known prior cardiac history.    - Continue ASA and statin  - Referral to cardiology

## 2023-02-01 NOTE — ASSESSMENT & PLAN NOTE
- SP Trelstar 11.25mg IM 1/4/23; next 3/29/23 - receiving through Dr. Edmond's office  - Start abiraterone 250mg po daily with low fat meal and prednisone 5mg po daily; tentative start 2/15/23  - Safety labs today and q2 weeks after starting AAP (first 3/1/23)  - FU Dr. Zuleta 3/15/23    - Start Ca/D; schedule bone mineral density test  - Referral to cancer genetics    - Follow up with radiation oncology in 3-6 months

## 2023-02-01 NOTE — ASSESSMENT & PLAN NOTE
History     Chief Complaint   Patient presents with     Leg Pain     HPI  Vanesa Fleming is a 78 year old female patient from a nursing home, history of dementia brought into the ER for evaluation of left hip pain.  Nursing home staff noticed that the patient was uncomfortable bearing weight on her left hip area.  No report of any trauma or fall or injury.  No report of any fever or chills.  No urinary symptoms.  Denies any chest pain or shortness of breath.  Denies any other concern or complaints.    Allergies:  No Known Allergies    Problem List:    Patient Active Problem List    Diagnosis Date Noted     ACP (advance care planning) 04/05/2017     Priority: Medium     Advance Care Planning 4/5/2017: Receipt of ACP document:  Received: Health Care Directive which was witnessed or notarized on [id number].  Document not previously scanned.  Reviewed for validity as medical order and sent to be scanned.  Code Status reflects choices in most recent ACP document..  Confirmed/documented designated decision maker(s).  Added by Eli Jaimes         Benign essential hypertension 01/24/2017     Priority: Medium     Mild recurrent major depression (H) 01/24/2017     Priority: Medium     Mild cognitive impairment with memory loss 01/24/2017     Priority: Medium     Elevated LDL cholesterol level 01/24/2017     Priority: Medium     CVA (cerebral vascular accident) (H) 01/24/2017     Priority: Medium     TIA (transient ischemic attack) 01/23/2017     Priority: Medium        Past Medical History:    Past Medical History:   Diagnosis Date     Anxiety state      Diverticulosis of colon      Essential hypertension      Gout      Hypothyroidism      Malignant neoplasm of breast (female) (H)      Obesity      Other and unspecified hyperlipidemia      Other chronic nonalcoholic liver disease        Past Surgical History:    Past Surgical History:   Procedure Laterality Date     COLONOSCOPY      2/28/05     LAPAROSCOPY DIAGNOSTIC  - Referral to oncology psychology  - Currently on venlafaxine   (GYN)      with tubal banding     OTHER SURGICAL HISTORY      02,220011,BIOPSY BREAST,Left breast biopsy for a 3 centimeter left breast cancer     OTHER SURGICAL HISTORY      02,RAY651,LYMPH NODE DISSECTION,Left lumpectomy with axillary node dissection     PHACOEMULSIFICATION WITH STANDARD INTRAOCULAR LENS IMPLANT Right 2018    Procedure: PHACOEMULSIFICATION WITH STANDARD INTRAOCULAR LENS IMPLANT;  CATARACT EXTRACTION RIGHT EYE WITH IMPLANT;  Surgeon: Andrews Oglesby MD;  Location: [facility]     PHACOEMULSIFICATION WITH STANDARD INTRAOCULAR LENS IMPLANT Left 2018    Procedure: PHACOEMULSIFICATION WITH STANDARD INTRAOCULAR LENS IMPLANT;  CATARACT EXTRACTION LEFT EYE WITH IMPLANT;  Surgeon: Andrews Oglesby MD;  Location: [facility]       Family History:    Family History   Problem Relation Age of Onset     Heart Disease Mother         Heart Disease, age 63 of myocardial infarction,     Diabetes Mother         Diabetes     Hypertension Mother         Hypertension     Thyroid Disease Mother         Thyroid Disease     Other - See Comments Father         Alzheimer's     Substance Abuse Maternal Uncle         Alcohol/Drug,Chemical dependency     Other - See Comments Son         Ankylosing spondylitis     Colon Cancer Brother         Cancer-colon,Colon cancer age 58     Prostate Cancer Brother         Cancer-prostate     Hypertension Brother         Hypertension     Hypertension Sister         Hypertension     Seizure Disorder Sister         Seizures     Other - See Comments Sister         Psychiatric illness,depression     Other - See Comments Maternal Aunt         Psychiatric illness       Social History:  Marital Status:   [5]  Social History     Tobacco Use     Smoking status: Former     Smokeless tobacco: Never   Substance Use Topics     Alcohol use: Not Currently     Drug use: Unknown     Types: Other     Comment: Drug use: Not Asked        Medications:    acetaminophen (TYLENOL) 325 MG  tablet  ALLOPURINOL PO  aspirin 81 MG EC tablet  atorvastatin (LIPITOR) 80 MG tablet  BUSPIRONE HCL PO  donepezil (ARICEPT) 5 MG tablet  furosemide (LASIX) 40 MG tablet  LEVOTHYROXINE SODIUM PO  MECLIZINE HCL PO  memantine (NAMENDA) 5 MG tablet  METOPROLOL SUCCINATE ER PO  Multiple Vitamins-Minerals (PRESERVISION AREDS PO)  multivitamin, therapeutic (THERA-VIT) TABS tablet  SERTRALINE HCL PO  traZODone (DESYREL) 50 MG tablet  naproxen sodium (ANAPROX) 220 MG tablet          Review of Systems   All other systems reviewed and are negative.      Physical Exam   BP: 140/97  Pulse: 63  Temp: 98.1  F (36.7  C)  Resp: 16  SpO2: 96 %      Physical Exam  Constitutional:       General: She is not in acute distress.     Appearance: Normal appearance. She is well-developed. She is not ill-appearing, toxic-appearing or diaphoretic.   HENT:      Head: Normocephalic and atraumatic.      Nose: Nose normal. No congestion or rhinorrhea.      Mouth/Throat:      Pharynx: No oropharyngeal exudate or posterior oropharyngeal erythema.   Eyes:      General: No scleral icterus.        Right eye: No discharge.         Left eye: No discharge.      Extraocular Movements: Extraocular movements intact.      Conjunctiva/sclera: Conjunctivae normal.      Pupils: Pupils are equal, round, and reactive to light.   Neck:      Vascular: No carotid bruit.   Cardiovascular:      Rate and Rhythm: Normal rate and regular rhythm.      Heart sounds: Normal heart sounds.   Pulmonary:      Effort: No respiratory distress.      Breath sounds: Normal breath sounds. No stridor. No wheezing, rhonchi or rales.   Chest:      Chest wall: No tenderness.   Abdominal:      General: Bowel sounds are normal. There is no distension.      Palpations: Abdomen is soft. There is no mass.      Tenderness: There is no abdominal tenderness. There is no right CVA tenderness, left CVA tenderness, guarding or rebound.      Hernia: No hernia is present.   Musculoskeletal:          General: Tenderness present. No swelling, deformity or signs of injury. Normal range of motion.      Cervical back: Normal range of motion and neck supple. No rigidity or tenderness.      Right lower leg: No edema.      Left lower leg: No edema.      Comments: Mild tenderness on palpation of the symphysis pubis area.  No erythema or swelling or deformity.  Normal range of motion.  CMS intact   Lymphadenopathy:      Cervical: No cervical adenopathy.   Skin:     General: Skin is warm and dry.      Coloration: Skin is not jaundiced or pale.      Findings: No bruising, erythema, lesion or rash.   Neurological:      General: No focal deficit present.      Mental Status: She is alert and oriented to person, place, and time. Mental status is at baseline.      Cranial Nerves: No cranial nerve deficit.      Sensory: No sensory deficit.      Motor: No weakness.      Coordination: Coordination normal.      Gait: Gait normal.      Deep Tendon Reflexes: Reflexes normal.         ED Course          Patient was seen and examined shortly after arrival.  Stable.  Lab and imaging reviewed.  X-ray shows pelvic fracture.  CT was recommended.  Shows a pelvic fracture and no other acute abnormalities was identified.  I did consulted with orthopedic on-call Dr. Locke and he recommended pain control, weightbearing as tolerated and follow-up as an outpatient within 3 weeks.  Patient is feeling comfortable and refused any pain medication at this point.  Her daughter is at the bedside stated that they will take her home to stay with one of them and arrange for further care.  Advised to  Rest and stay well-hydrated  Tylenol as needed for pain and discomfort  Weightbearing as tolerated  Follow-up with orthopedic in 3 weeks  Close follow-up with PCP  Come back for any concern or any worsening symptoms  Patient and her family agrees with the plan.  Stable for discharge.       Procedures              Critical Care time:  none               Results  for orders placed or performed during the hospital encounter of 01/22/23 (from the past 24 hour(s))   Basic metabolic panel   Result Value Ref Range    Sodium 138 136 - 145 mmol/L    Potassium 4.0 3.4 - 5.3 mmol/L    Chloride 100 98 - 107 mmol/L    Carbon Dioxide (CO2) 30 (H) 22 - 29 mmol/L    Anion Gap 8 7 - 15 mmol/L    Urea Nitrogen 18.4 8.0 - 23.0 mg/dL    Creatinine 1.07 (H) 0.51 - 0.95 mg/dL    Calcium 9.6 8.8 - 10.2 mg/dL    Glucose 90 70 - 99 mg/dL    GFR Estimate 53 (L) >60 mL/min/1.73m2   CBC with platelets differential    Narrative    The following orders were created for panel order CBC with platelets differential.  Procedure                               Abnormality         Status                     ---------                               -----------         ------                     CBC with platelets and d...[[id number]]                      Final result                 Please view results for these tests on the individual orders.   CRP inflammation   Result Value Ref Range    CRP Inflammation 10.53 (H) <5.00 mg/L   Erythrocyte sedimentation rate auto   Result Value Ref Range    Erythrocyte Sedimentation Rate 12 0 - 30 mm/hr   CBC with platelets and differential   Result Value Ref Range    WBC Count 9.5 4.0 - 11.0 10e3/uL    RBC Count 4.09 3.80 - 5.20 10e6/uL    Hemoglobin 12.8 11.7 - 15.7 g/dL    Hematocrit 38.2 35.0 - 47.0 %    MCV 93 78 - 100 fL    MCH 31.3 26.5 - 33.0 pg    MCHC 33.5 31.5 - 36.5 g/dL    RDW 14.3 10.0 - 15.0 %    Platelet Count 174 150 - 450 10e3/uL    % Neutrophils 79 %    % Lymphocytes 13 %    % Monocytes 8 %    % Eosinophils 0 %    % Basophils 0 %    % Immature Granulocytes 0 %    NRBCs per 100 WBC 0 <1 /100    Absolute Neutrophils 7.4 1.6 - 8.3 10e3/uL    Absolute Lymphocytes 1.3 0.8 - 5.3 10e3/uL    Absolute Monocytes 0.7 0.0 - 1.3 10e3/uL    Absolute Eosinophils 0.0 0.0 - 0.7 10e3/uL    Absolute Basophils 0.0 0.0 - 0.2 10e3/uL    Absolute Immature Granulocytes 0.0 <=0.4  10e3/uL    Absolute NRBCs 0.0 10e3/uL   Extra Tube    Narrative    The following orders were created for panel order Extra Tube.  Procedure                               Abnormality         Status                     ---------                               -----------         ------                     Extra Blue Top Tube[[id number]]                              Final result               Extra Red Top Tube[[id number]]                               Final result               Extra Heparinized Syringe[[id number]]                        Final result                 Please view results for these tests on the individual orders.   Extra Blue Top Tube   Result Value Ref Range    Hold Specimen JIC    Extra Red Top Tube   Result Value Ref Range    Hold Specimen JIC    Extra Heparinized Syringe   Result Value Ref Range    Hold Specimen JIC    XR Pelvis including Hip Left 2-3 Views    Narrative    XR PELVIS AND HIP LEFT 2 VIEWS    HISTORY: 78 years Female left hip pain    COMPARISON: 10/14/2021    TECHNIQUE: Pelvis and left hip 3 views total    FINDINGS: There is osteopenia. There are mildly displaced fractures of  the superior and inferior left pubic rami. There is advanced left hip  osteophytic change.    The sacroiliac joints and pubic symphysis are congruent      Impression    IMPRESSION: Mildly displaced superior and inferior left pubic rami  fractures are present.    JEFF NANCE MD         SYSTEM ID:  [id number]   CT Pelvis Bone wo Contrast    Narrative    CT PELVIS BONE WO CONTRAST    HISTORY: 78 years Female history of dementia brought into the ER for  evaluation of left hip pain. ?Nursing home staff noticed that the  patient was uncomfortable bearing weight on her left hip area. ?No  report of any trauma or fall or injury. ?    COMPARISON: None    TECHNIQUE: Axial CT imaging of the pelvis was performed without  contrast. Coronal and sagittal reconstructions were obtained.    FINDINGS: There is osteopenia. There  are acute appearing minimally  displaced fractures of the superior and inferior left pubic rami.    The pubic symphysis and sacroiliac articulations are congruent.    Both hip joints are congruent. There is advanced bilateral hip  osteoarthritic change. There is prolific marginal osteophytic change  at the femoral head neck junctions. There is no evidence of proximal  femur fracture.      Impression    IMPRESSION: There are acute minimally displaced left-sided superior  and inferior pubic rami fractures there is no evidence of left hip  fracture otherwise.    JEFF NANCE MD         SYSTEM ID:  [id number]       Medications - No data to display    Assessments & Plan (with Medical Decision Making)     I have reviewed the nursing notes.    I have reviewed the findings, diagnosis, plan and need for follow up with the patient.      New Prescriptions    No medications on file       Final diagnoses:   Closed nondisplaced fracture of pelvis, unspecified part of pelvis, initial encounter (H)       1/22/2023   [facility]     Marjorie Ambriz MD  01/22/23 1429

## 2023-02-03 ENCOUNTER — SPECIALTY PHARMACY (OUTPATIENT)
Dept: PHARMACY | Facility: CLINIC | Age: 58
End: 2023-02-03
Payer: MEDICAID

## 2023-02-03 NOTE — TELEPHONE ENCOUNTER
Eliza, this is Dougie Padilla with [facility].  We are working on your prescription that your doctor has sent us. We will be working with your insurance to get this approved for you. We will be calling you along the way with updates on your medication.  If you have any questions, you can reach us at ([phone])    Welcome call outcome: No answer/Unable to leave voicemail    VMBox full. Sent an SMS notification instead.     Zytiga - No PA required.   Test claim: $0 copay    Prednisone - No PA required.  Test claim: $0 copay.     Dose appropriate. Releasing script to [facility] for initial consult.     Tentative start date: 02/15/23

## 2023-02-06 ENCOUNTER — SPECIALTY PHARMACY (OUTPATIENT)
Dept: PHARMACY | Facility: CLINIC | Age: 58
End: 2023-02-06
Payer: MEDICAID

## 2023-02-06 NOTE — TELEPHONE ENCOUNTER
Incoming call: Notified pt that we are waiting for a dose clarification. Will call pt back for initial consult once dose is confirmed.

## 2023-02-06 NOTE — TELEPHONE ENCOUNTER
Messaged [facility] to confirm Prednisone dose. BID vs Daily.   Per [facility] notes, tentative start dates is 02/15/23. Pending f/up accordingly.

## 2023-02-10 NOTE — TELEPHONE ENCOUNTER
Specialty Pharmacy - Initial Clinical Assessment    Specialty Medication Orders Linked to Encounter      Flowsheet Row Most Recent Value   Medication #1 predniSONE (DELTASONE) 5 MG tablet (Order#[id number], Rx#[id number])   Medication #2 abiraterone (ZYTIGA) 250 mg Tab (Order#[id number], Rx#[id number])          Patient Diagnosis   C61 - Prostate cancer    Subjective    Mauro Benitez is a 57 y.o. male, who is followed by the specialty pharmacy service for management and education.    Recent Encounters       Date Type Provider Description    02/06/2023 Specialty Pharmacy Dougie Padilla PharmD Initial Clinical Assessment    02/03/2023 Specialty Pharmacy Dougie Padilla PharmD Referral Authorization          Clinical call attempts since last clinical assessment   2/6/2023  7:05 PM - Specialty Pharmacy - Clinical Assessment by Dougie Padilla PharmD     Current Outpatient Medications   Medication Sig    abiraterone (ZYTIGA) 250 mg Tab Take 1 tablet (250 mg total) by mouth once daily Take as directed with a low fat meal.    acetaminophen (TYLENOL) 500 MG tablet Take 1,500 mg by mouth daily as needed for Pain.    amLODIPine (NORVASC) 10 MG tablet Take 10 mg by mouth every evening.    aspirin (ECOTRIN) 81 MG EC tablet Aspir-81 mg tablet,delayed release   Take 1 tablet every day by oral route in the morning for 30 days.    atorvastatin (LIPITOR) 20 MG tablet Take 20 mg by mouth once daily.    blood sugar diagnostic (ONETOUCH VERIO TEST STRIPS MISC) OneTouch Verio test strips    calcium carbonate (OS-CALOS) 500 mg calcium (1,250 mg) tablet Take 2 tablets (1,000 mg total) by mouth once daily.    carvedilol (COREG) 12.5 MG tablet Take 12.5 mg by mouth 2 (two) times daily with meals.    cholecalciferol, vitamin D3, (VITAMIN D3) 25 mcg (1,000 unit) capsule Take 1 capsule (1,000 Units total) by mouth once daily.    cyproheptadine (PERIACTIN) 4 mg tablet Take by mouth.    fluticasone propionate (FLONASE) 50  mcg/actuation nasal spray fluticasone propionate 50 mcg/actuation nasal spray,suspension   SPRAY 2 SPRAYS EVERY DAY BY INTRANASAL ROUTE AS NEEDED.    gabapentin (NEURONTIN) 400 MG capsule Take 400 mg by mouth 3 (three) times daily.    loratadine (CLARITIN) 10 mg tablet loratadine 10 mg tablet    losartan (COZAAR) 100 MG tablet Take 100 mg by mouth once daily.    metFORMIN (GLUCOPHAGE) 500 MG tablet Take 500 mg by mouth 2 (two) times daily with meals.    omeprazole (PRILOSEC) 20 MG capsule omeprazole 20 mg capsule,delayed release   TAKE 1 TABLET BY MOUTH EVERY DAY    oxybutynin (DITROPAN-XL) 10 MG 24 hr tablet oxybutynin chloride ER 10 mg tablet,extended release 24 hr   TAKE 1 TABLET BY MOUTH DAILY    predniSONE (DELTASONE) 5 MG tablet Take 1 tablet (5 mg total) by mouth once daily. Take as directed with water on an empty stomach.    sildenafiL (VIAGRA) 100 MG tablet Take 100 mg by mouth daily as needed for Erectile Dysfunction.   Last reviewed on 2/10/2023  2:42 PM by Dougie Padilla PharmD    Review of patient's allergies indicates:   Allergen Reactions    Lisinopril Shortness Of Breath   Last reviewed on  2/1/2023 8:03 AM by Jana Maki    Drug Interactions    Drug interactions evaluated: yes  Clinically relevant drug interactions identified: no  Provided the patient with educational material regarding drug interactions: not applicable         Adverse Effects    *All other systems reviewed and are negative       Assessment Questions - Documented Responses      Flowsheet Row Most Recent Value   Assessment    Medication Reconciliation completed for patient Yes   During the past 4 weeks, has patient missed any activities due to condition or medication? No   During the past 4 weeks, did patient have any of the following urgent care visits? None   Goals of Therapy Status Discussed (new start)   Status of the patients ability to self-administer: Is Able   All education points have been covered with patient?  "Yes, supplemental printed education provided   Welcome packet contents reviewed and discussed with patient? Yes   Assesment completed? Yes   Plan Therapy being initiated   Do you need to open a clinical intervention (i-vent)? No   Do you want to schedule first shipment? Yes   Medication #1 Assessment Info    Patient status New medication, New to [facility]   Is this medication appropriate for the patient? Yes   Is this medication effective? Not yet started   Medication #2 Assessment Info    Patient status New medication, New to [facility]   Is this medication appropriate for the patient? Yes   Is this medication effective? Not yet started          Refill Questions - Documented Responses      Flowsheet Row Most Recent Value   Patient Availability and HIPAA Verification    Does patient want to proceed with activity? Yes   HIPAA/medical authority confirmed? Yes   Relationship to patient of person spoken to? Self   Refill Screening Questions    When does the patient need to receive the medication? 02/15/23   Refill Delivery Questions    How will the patient receive the medication? [facility]   When does the patient need to receive the medication? 02/15/23   Shipping Address Home   Address in [facility] confirmed and updated if neccessary? Yes   Expected Copay ($) 0   Is the patient able to afford the medication copay? Yes   Payment Method zero copay   Days supply of Refill 30   Supplies needed? --  [Hydrocortisone cream]   Refill activity completed? Yes   Refill activity plan Refill scheduled   Shipment/Pickup Date: 02/13/23            Objective    He has a past medical history of Diabetes mellitus, GERD (gastroesophageal reflux disease), Hypertension, and Prostate cancer.    Tried/failed medications: None    BP Readings from Last 4 Encounters:   02/01/23 139/77   01/23/23 136/83   01/04/23 120/70   12/05/22 (!) 153/89     Ht Readings from Last 4 Encounters:   02/01/23 5' 11" (1.803 m)   01/23/23 5' 11" (1.803 m)   01/04/23 5' 11" " "(1.803 m)   12/05/22 5' 11" (1.803 m)     Wt Readings from Last 4 Encounters:   02/01/23 97 kg (213 lb 13.5 oz)   01/23/23 96.3 kg (212 lb 6.4 oz)   01/04/23 91.5 kg (201 lb 12.8 oz)   12/05/22 94.7 kg (208 lb 14.2 oz)     Recent Labs   Lab Result Units 02/01/23  0925   RBC M/uL 3.67 L   Hemoglobin g/dL 10.7 L   Hematocrit % 36.1 L   WBC K/uL 7.48   Gran # (ANC) K/uL 4.5   Platelets K/uL 382   Sodium mmol/L 137   Potassium mmol/L 5.1   Chloride mmol/L 108   Glucose mg/dL 177 H   BUN mg/dL 18   Creatinine mg/dL 1.3   Calcium mg/dL 9.4   Total Protein g/dL 7.4   Albumin g/dL 3.7   Total Bilirubin mg/dL 0.3   Alkaline Phosphatase U/L 103   AST U/L 18   ALT U/L 26     The goals of cancer treatment include:  Achieving remission of cancer, if possible  Reducing tumor size and spread of cancer, if remission is not possible  Minimizing pain and symptoms of the cancer  Preventing infection and other complications of treatment  Promoting adequate nutrition  Encouraging proper hydration  Improving or maintaining quality of life  Maintaining optimal therapy adherence  Minimizing and managing side effects    Goals of Therapy Status: Discussed (new start)    Assessment/Plan  Patient plans to start therapy on 02/15/23    Interventions added this encounter   Open: [facility] Provider Intervention - Drug therapy adherence: abiraterone (ZYTIGA) 250 mg Tab     Indication, dosage, appropriateness, effectiveness, safety and convenience of his specialty medication(s) were reviewed today.     Patient Education   Patient received education on the following:   Expectations and possible outcomes of therapy  Proper use, timely administration, and missed dose management  Duration of therapy  Side effects, including prevention, minimization, and management  Contraindications and safety precautions  New or changed medications, including prescribe and over the counter medications and supplements  Reviews recommended vaccinations, as " appropriate  Storage, safe handling, and disposal        Tasks added this encounter   No tasks added.   Tasks due within next 3 months   2/3/2023 - Clinical - Initial Assessment     Dougie Padilla, PharmD  [facility]  [address] [place] [id number]  Phone: [phone]  Fax: [phone]

## 2023-02-15 ENCOUNTER — TELEPHONE (OUTPATIENT)
Dept: HEMATOLOGY/ONCOLOGY | Facility: CLINIC | Age: 58
End: 2023-02-15
Payer: MEDICAID

## 2023-02-15 NOTE — TELEPHONE ENCOUNTER
----- Message from Gillian Kim sent at 2/15/2023  8:28 AM CST -----  Consult/Advisory    Name Of Caller: self      Contact Preference?: [phone]      Nature of Call? Was advised to call when he began taking his medication

## 2023-02-15 NOTE — TELEPHONE ENCOUNTER
----- Message from Elaina Quezada sent at 2/15/2023  8:24 AM CST -----  Regarding: Consult/Advisory      Name Of Caller:  Mauro      Contact Preference?:   [phone]        Nature of Call?  Calling to let the doctor know that he has started taking the following medication:  abiraterone (ZYTIGA) 250 mg Tab

## 2023-02-15 NOTE — TELEPHONE ENCOUNTER
----- Message from Gillian Kim sent at 2/15/2023  8:28 AM CST -----  Consult/Advisory    Name Of Caller: self      Contact Preference?: [phone]      Nature of Call? Was advised to call when he began taking his medication

## 2023-02-15 NOTE — TELEPHONE ENCOUNTER
I attempted to call patient, no answer. Left him a message on her voicemail that I would make sure Dr. Zuleta gets the message.

## 2023-03-02 ENCOUNTER — TELEPHONE (OUTPATIENT)
Dept: HEMATOLOGY/ONCOLOGY | Facility: CLINIC | Age: 58
End: 2023-03-02
Payer: MEDICAID

## 2023-03-02 DIAGNOSIS — E87.5 HYPERKALEMIA: Primary | ICD-10-CM

## 2023-03-02 NOTE — TELEPHONE ENCOUNTER
----- Message from Edward Zuleta MD sent at 3/2/2023  7:40 AM CST -----  Potassium incidentally up to 5.8. Glucose also high at 241. Labs otherwise OK. Can we have him repeat CMP today? Thanks

## 2023-03-02 NOTE — TELEPHONE ENCOUNTER
Spoke with patient.  Scheduled him for repeat CMP today, as his K and glucose were elevated yesterday.  He will proceed to lab now before he heads into work.

## 2023-03-03 ENCOUNTER — TELEPHONE (OUTPATIENT)
Dept: INFUSION THERAPY | Facility: HOSPITAL | Age: 58
End: 2023-03-03
Payer: MEDICAID

## 2023-03-10 ENCOUNTER — SPECIALTY PHARMACY (OUTPATIENT)
Dept: PHARMACY | Facility: CLINIC | Age: 58
End: 2023-03-10
Payer: MEDICAID

## 2023-03-10 NOTE — TELEPHONE ENCOUNTER
Specialty Pharmacy - Refill Coordination    Specialty Medication Orders Linked to Encounter      Flowsheet Row Most Recent Value   Medication #1 predniSONE (DELTASONE) 5 MG tablet (Order#[id number], Rx#[id number])   Medication #2 abiraterone (ZYTIGA) 250 mg Tab (Order#[id number], Rx#[id number])            Refill Questions - Documented Responses      Flowsheet Row Most Recent Value   Patient Availability and HIPAA Verification    Does patient want to proceed with activity? Yes   HIPAA/medical authority confirmed? Yes   Relationship to patient of person spoken to? Self   Refill Screening Questions    Changes to allergies? No   Changes to medications? No   New conditions since last clinic visit? No   Unplanned office visit, urgent care, ED, or hospital admission in the last 4 weeks? No   How does patient/caregiver feel medication is working? Good   Financial problems or insurance changes? No   How many doses of your specialty medications were missed in the last 4 weeks? 0   Would patient like to speak to a pharmacist? No   When does the patient need to receive the medication? 03/17/23   Refill Delivery Questions    How will the patient receive the medication? [facility]   When does the patient need to receive the medication? 03/17/23   Shipping Address Home   Address [facility] confirmed and updated if neccessary? Yes   Expected Copay ($) 0   Is the patient able to afford the medication copay? Yes   Payment Method zero copay   Days supply of Refill 30   Supplies needed? No supplies needed   Refill activity completed? Yes   Refill activity plan Refill scheduled   Shipment/Pickup Date: 03/13/23            Current Outpatient Medications   Medication Sig    abiraterone (ZYTIGA) 250 mg Tab Take 1 tablet (250 mg total) by mouth once daily Take as directed with a low fat meal.    acetaminophen (TYLENOL) 500 MG tablet Take 1,500 mg by mouth daily as needed for Pain.    amLODIPine (NORVASC) 10 MG tablet Take 10 mg by mouth  every evening.    aspirin (ECOTRIN) 81 MG EC tablet Aspir-81 mg tablet,delayed release   Take 1 tablet every day by oral route in the morning for 30 days.    atorvastatin (LIPITOR) 20 MG tablet Take 20 mg by mouth once daily.    blood sugar diagnostic (ONETOUCH VERIO TEST STRIPS MISC) OneTouch Verio test strips    calcium carbonate (OS-CALOS) 500 mg calcium (1,250 mg) tablet Take 2 tablets (1,000 mg total) by mouth once daily.    carvedilol (COREG) 12.5 MG tablet Take 12.5 mg by mouth 2 (two) times daily with meals.    cholecalciferol, vitamin D3, (VITAMIN D3) 25 mcg (1,000 unit) capsule Take 1 capsule (1,000 Units total) by mouth once daily.    cyproheptadine (PERIACTIN) 4 mg tablet Take by mouth.    fluticasone propionate (FLONASE) 50 mcg/actuation nasal spray fluticasone propionate 50 mcg/actuation nasal spray,suspension   SPRAY 2 SPRAYS EVERY DAY BY INTRANASAL ROUTE AS NEEDED.    gabapentin (NEURONTIN) 400 MG capsule Take 400 mg by mouth 3 (three) times daily.    loratadine (CLARITIN) 10 mg tablet loratadine 10 mg tablet    losartan (COZAAR) 100 MG tablet Take 100 mg by mouth once daily.    metFORMIN (GLUCOPHAGE) 500 MG tablet Take 500 mg by mouth 2 (two) times daily with meals.    omeprazole (PRILOSEC) 20 MG capsule omeprazole 20 mg capsule,delayed release   TAKE 1 TABLET BY MOUTH EVERY DAY    oxybutynin (DITROPAN-XL) 10 MG 24 hr tablet oxybutynin chloride ER 10 mg tablet,extended release 24 hr   TAKE 1 TABLET BY MOUTH DAILY    predniSONE (DELTASONE) 5 MG tablet Take 1 tablet (5 mg total) by mouth once daily. Take as directed with water on an empty stomach.    sildenafiL (VIAGRA) 100 MG tablet Take 100 mg by mouth daily as needed for Erectile Dysfunction.   Last reviewed on 2/10/2023  2:42 PM by Dougie Padilla PharmD    Review of patient's allergies indicates:   Allergen Reactions    Lisinopril Shortness Of Breath    Last reviewed on  2/1/2023 8:03 AM by Jana Mckenzie added this encounter    4/9/2023 - Refill Call (Auto Added)   Tasks due within next 3 months   No tasks due.     Cristy Brown, PharmD  [facility]  [facility] [facility] [address]  Phone: [phone]  Fax: [phone]

## 2023-03-14 ENCOUNTER — HOSPITAL ENCOUNTER (OUTPATIENT)
Dept: RADIOLOGY | Facility: CLINIC | Age: 58
Discharge: HOME | End: 2023-03-14
Attending: HOSPITALIST
Payer: MEDICAID

## 2023-03-14 DIAGNOSIS — Z79.818 ANDROGEN DEPRIVATION THERAPY: ICD-10-CM

## 2023-03-14 DIAGNOSIS — C61 PROSTATE CANCER: ICD-10-CM

## 2023-03-14 PROCEDURE — 77085 DXA BONE DENSITY AXL VRT FX: CPT | Mod: TC

## 2023-03-14 PROCEDURE — 77085 DEXA BONE DENSITY WITH VERTEBRAL FRACTURE ASSESSMENT: ICD-10-PCS | Mod: 26,,, | Performed by: INTERNAL MEDICINE

## 2023-03-14 PROCEDURE — 77085 DXA BONE DENSITY AXL VRT FX: CPT | Mod: 26,,, | Performed by: INTERNAL MEDICINE

## 2023-03-15 ENCOUNTER — TELEPHONE (OUTPATIENT)
Dept: HEMATOLOGY/ONCOLOGY | Facility: CLINIC | Age: 58
End: 2023-03-15
Payer: MEDICAID

## 2023-03-16 ENCOUNTER — LAB VISIT (OUTPATIENT)
Dept: LAB | Facility: HOSPITAL | Age: 58
End: 2023-03-16
Attending: HOSPITALIST
Payer: MEDICAID

## 2023-03-16 ENCOUNTER — OFFICE VISIT (OUTPATIENT)
Dept: HEMATOLOGY/ONCOLOGY | Facility: CLINIC | Age: 58
End: 2023-03-16
Payer: MEDICAID

## 2023-03-16 VITALS
BODY MASS INDEX: 31.7 KG/M2 | SYSTOLIC BLOOD PRESSURE: 134 MMHG | OXYGEN SATURATION: 98 % | RESPIRATION RATE: 18 BRPM | TEMPERATURE: 98 F | DIASTOLIC BLOOD PRESSURE: 78 MMHG | HEIGHT: 71 IN | WEIGHT: 226.44 LBS | HEART RATE: 81 BPM

## 2023-03-16 DIAGNOSIS — C61 PROSTATE CANCER: Primary | ICD-10-CM

## 2023-03-16 DIAGNOSIS — I10 HYPERTENSION, UNSPECIFIED TYPE: ICD-10-CM

## 2023-03-16 DIAGNOSIS — K31.84 GASTROPARESIS DUE TO DM: ICD-10-CM

## 2023-03-16 DIAGNOSIS — E11.43 GASTROPARESIS DUE TO DM: ICD-10-CM

## 2023-03-16 DIAGNOSIS — E11.42 TYPE 2 DIABETES MELLITUS WITH DIABETIC POLYNEUROPATHY, WITHOUT LONG-TERM CURRENT USE OF INSULIN: ICD-10-CM

## 2023-03-16 DIAGNOSIS — F32.A DEPRESSION, UNSPECIFIED DEPRESSION TYPE: ICD-10-CM

## 2023-03-16 DIAGNOSIS — E11.42 DIABETIC PERIPHERAL NEUROPATHY: ICD-10-CM

## 2023-03-16 DIAGNOSIS — C61 PROSTATE CANCER: ICD-10-CM

## 2023-03-16 PROBLEM — A08.39 DIARRHEA DUE TO COVID-19: Status: RESOLVED | Noted: 2021-09-14 | Resolved: 2023-03-16

## 2023-03-16 PROBLEM — J30.2 SEASONAL ALLERGIC RHINITIS: Status: RESOLVED | Noted: 2021-12-21 | Resolved: 2023-03-16

## 2023-03-16 PROBLEM — N17.9 AKI (ACUTE KIDNEY INJURY): Status: RESOLVED | Noted: 2020-10-24 | Resolved: 2023-03-16

## 2023-03-16 PROBLEM — Q82.8: Status: RESOLVED | Noted: 2019-05-09 | Resolved: 2023-03-16

## 2023-03-16 PROBLEM — U07.1 DIARRHEA DUE TO COVID-19: Status: RESOLVED | Noted: 2021-09-14 | Resolved: 2023-03-16

## 2023-03-16 LAB
ALBUMIN SERPL BCP-MCNC: 3.6 G/DL (ref 3.5–5.2)
ALP SERPL-CCNC: 105 U/L (ref 55–135)
ALT SERPL W/O P-5'-P-CCNC: 26 U/L (ref 10–44)
ANION GAP SERPL CALC-SCNC: 10 MMOL/L (ref 8–16)
AST SERPL-CCNC: 19 U/L (ref 10–40)
BILIRUB SERPL-MCNC: 0.5 MG/DL (ref 0.1–1)
BUN SERPL-MCNC: 18 MG/DL (ref 6–20)
CALCIUM SERPL-MCNC: 9.3 MG/DL (ref 8.7–10.5)
CHLORIDE SERPL-SCNC: 103 MMOL/L (ref 95–110)
CO2 SERPL-SCNC: 22 MMOL/L (ref 23–29)
COMPLEXED PSA SERPL-MCNC: <0.01 NG/ML (ref 0–4)
CREAT SERPL-MCNC: 1.2 MG/DL (ref 0.5–1.4)
ERYTHROCYTE [DISTWIDTH] IN BLOOD BY AUTOMATED COUNT: 13.1 % (ref 11.5–14.5)
EST. GFR  (NO RACE VARIABLE): >60 ML/MIN/1.73 M^2
GLUCOSE SERPL-MCNC: 240 MG/DL (ref 70–110)
HCT VFR BLD AUTO: 34.1 % (ref 40–54)
HGB BLD-MCNC: 10.4 G/DL (ref 14–18)
IMM GRANULOCYTES # BLD AUTO: 0.03 K/UL (ref 0–0.04)
MCH RBC QN AUTO: 29.5 PG (ref 27–31)
MCHC RBC AUTO-ENTMCNC: 30.5 G/DL (ref 32–36)
MCV RBC AUTO: 97 FL (ref 82–98)
NEUTROPHILS # BLD AUTO: 5.7 K/UL (ref 1.8–7.7)
PLATELET # BLD AUTO: 253 K/UL (ref 150–450)
PMV BLD AUTO: 9.7 FL (ref 9.2–12.9)
POTASSIUM SERPL-SCNC: 5.2 MMOL/L (ref 3.5–5.1)
PROT SERPL-MCNC: 7 G/DL (ref 6–8.4)
RBC # BLD AUTO: 3.52 M/UL (ref 4.6–6.2)
SODIUM SERPL-SCNC: 135 MMOL/L (ref 136–145)
WBC # BLD AUTO: 7.75 K/UL (ref 3.9–12.7)

## 2023-03-16 PROCEDURE — 99215 OFFICE O/P EST HI 40 MIN: CPT | Mod: S$PBB,,, | Performed by: HOSPITALIST

## 2023-03-16 PROCEDURE — 84153 ASSAY OF PSA TOTAL: CPT | Performed by: HOSPITALIST

## 2023-03-16 PROCEDURE — 3075F PR MOST RECENT SYSTOLIC BLOOD PRESS GE 130-139MM HG: ICD-10-PCS | Mod: CPTII,,, | Performed by: HOSPITALIST

## 2023-03-16 PROCEDURE — 3008F PR BODY MASS INDEX (BMI) DOCUMENTED: ICD-10-PCS | Mod: CPTII,,, | Performed by: HOSPITALIST

## 2023-03-16 PROCEDURE — 3075F SYST BP GE 130 - 139MM HG: CPT | Mod: CPTII,,, | Performed by: HOSPITALIST

## 2023-03-16 PROCEDURE — 3078F DIAST BP <80 MM HG: CPT | Mod: CPTII,,, | Performed by: HOSPITALIST

## 2023-03-16 PROCEDURE — 99214 OFFICE O/P EST MOD 30 MIN: CPT | Mod: PBBFAC | Performed by: HOSPITALIST

## 2023-03-16 PROCEDURE — 99999 PR PBB SHADOW E&M-EST. PATIENT-LVL IV: ICD-10-PCS | Mod: PBBFAC,,, | Performed by: HOSPITALIST

## 2023-03-16 PROCEDURE — 3078F PR MOST RECENT DIASTOLIC BLOOD PRESSURE < 80 MM HG: ICD-10-PCS | Mod: CPTII,,, | Performed by: HOSPITALIST

## 2023-03-16 PROCEDURE — 36415 COLL VENOUS BLD VENIPUNCTURE: CPT | Performed by: HOSPITALIST

## 2023-03-16 PROCEDURE — 80053 COMPREHEN METABOLIC PANEL: CPT | Performed by: HOSPITALIST

## 2023-03-16 PROCEDURE — 99999 PR PBB SHADOW E&M-EST. PATIENT-LVL IV: CPT | Mod: PBBFAC,,, | Performed by: HOSPITALIST

## 2023-03-16 PROCEDURE — 3008F BODY MASS INDEX DOCD: CPT | Mod: CPTII,,, | Performed by: HOSPITALIST

## 2023-03-16 PROCEDURE — 85027 COMPLETE CBC AUTOMATED: CPT | Performed by: HOSPITALIST

## 2023-03-16 PROCEDURE — 99215 PR OFFICE/OUTPT VISIT, EST, LEVL V, 40-54 MIN: ICD-10-PCS | Mod: S$PBB,,, | Performed by: HOSPITALIST

## 2023-03-16 NOTE — PATIENT INSTRUCTIONS
You are tolerating homronal therapy with ADT and abiraterone/prednisone well with expected side effects of hot flashes and weight gain. Be sure to get 30 minutes of cardiovascular exercise 5 times a week ideally.    If hot flashes get worse, we could try to start desvenlafaxine to reduce and severity.     Blood sugars are high since starting treatment. Be sure to monitor closely and follow up with your PCP.     Will sign you up for home blood pressure monitoring. [facility]bar on first floor can get you set up today.    Repeat labs in 2 weeks.    FU with Dr. Zuleta in 4 weeks

## 2023-03-16 NOTE — ASSESSMENT & PLAN NOTE
- Next ADT shot 3/29/23 with Dr. Edmond  - Con't abiraterone 250mg daily with low fat meal / prednisone 5mg daily  - Con't labs q2 weeks following abiraterone start x 2 months; then monthly  - Con't vitamin D/ Calcium - using over the counter  - FU DEXA scan results  - Radiation oncology trying to obtain prior RT course

## 2023-03-16 NOTE — PROGRESS NOTES
MEDICAL ONCOLOGY FOLLOW-UP VISIT.     Best Contact Phone Number(s): [phone] (home)      Cancer/Stage/TNM:    Cancer Staging   Prostate cancer  Staging form: Prostate, AJCC 8th Edition  - Clinical: Stage IVB (cTX, cN1, cM1a, PSA: 2.8, Grade Group: 3) - Signed by Edward Zuleta MD on 2/1/2023       Reason for visit: Mauro Benitez is a 57 y.o. male history of prostate cancer sp RALP with positive margins 11/2/2015. He was subsequently found to have BCR with PSA 2.8 06/2022 with maren relapse on PSMA imaging and subsequently started hormonal therapy 10/2022.  Medical course notable for recurrent nausea and vomiting thought due to gastroparesis. He presents to medical oncology clinic having started intensified ADT with abiraterone 250/5 and additional consideration of  RT therapy.      Interval History:     Overall feeling OK. Started abiraterone 2/15/23. Notes frequent hot flashes 10-15. Not currently interested in pharamcotherapy. No other signficant concerns. Energy is good. No significant pain. Appetite is good. Weight up ~ 13 pounds. Has increased urinary overflow incontinence. Asking about increasing oxybutynin dose.    No N/V. No abdominal pain. No heart racing or palpitations. No leg swelling. No other concerns.        Oncology History   Prostate cancer   10/12/2015 Imaging Significant Findings    CT a/p: No evidence of distant or maren metastatic disease    Bone scan: No evidence of metastatic disease. 1cm rounded area of uptake along left superomedial orbit/sphenoid thought possibly calcified menigioma.     11/2/2015 Surgery    Radical prostatectomy at [facility] in [place]. Pathology with prostate adenocarcinoma up to [place] 4+3 with associated PNI. Positive margin. 0/4 LN involved. Staged pT3aN0     2/23/2016 Imaging Significant Findings    Nuclear bone scan: No evidence of metastatic disease. Note made of uptake in left supraorbital region thought c/w left frontal sinusitis along  "with uptake in mandible and maxilla c/w periosteal disease.     4/25/2019 Initial Diagnosis    Prostate cancer     4/27/2021 Imaging Significant Findings    Bone scan:  There is abnormal increased radiotracer activity identified involving the maxilla and multiple paranasal sinuses likely related to periodontal disease and sinus disease. Recommend clinical correlation and further evaluation with maxillofacial CT as warranted.     6/6/2022 Tumor Markers    PSA 2.8     6/21/2022 Imaging Significant Findings    PSMA PET-CT: In this patient with prostate malignancy status post prostatectomy, there are tracer-avid left para-aortic, presacral/left common iliac, and left external iliac chain nodes as described above, concerning for recurrent metastatic disease.     9/16/2022 -  Hormone Therapy    Start bicalutamide 50mg po daily     10/3/2022 -  Hormone Therapy    Trelstar 11.25mg IM x1     12/19/2022 Tumor Markers    PSA 0.02     1/4/2023 -  Hormone Therapy    Trelstart 11.25mg IM x1 with Dr. Edmond     2/1/2023 Cancer Staged    Staging form: Prostate, AJCC 8th Edition  - Clinical: Stage IVB (cTX, cN1, cM1a, PSA: 2.8, Grade Group: 3)       2/1/2023 -  Chemotherapy    Treatment Summary   Plan Name: OP ABIRATERONE DAILY PREDNISONE BID  Treatment Goal: Curative  Status: Active  Start Date: 2/1/2023  End Date: 2/1/2023  Provider: Edward Zuleta MD  Chemotherapy: abiraterone (ZYTIGA) 250 mg Tab, 250 mg (100 % of original dose 250 mg), Oral, Daily, 1 of 1 cycle, Start date: 2/1/2023, End date: --  Dose modification: 250 mg (original dose 250 mg, Cycle 1)              Physical Exam:   /78 (BP Location: Left arm, Patient Position: Sitting, BP Method: Medium (Automatic))   Pulse 81   Temp 98.3 °F (36.8 °C) (Oral)   Resp 18   Ht 5' 11" (1.803 m)   Wt 102.7 kg (226 lb 6.6 oz)   SpO2 98%   BMI 31.58 kg/m²      ECOG Performance Status: (foot note - ECOG PS provided by Eastern Cooperative Oncology Group) 1 - Symptomatic " but completely ambulatory    Physical Exam  Constitutional:       General: He is not in acute distress.     Appearance: Normal appearance.   HENT:      Head: Normocephalic.      Nose: Nose normal.      Mouth/Throat:      Mouth: Mucous membranes are moist.      Pharynx: Oropharynx is clear.   Eyes:      Extraocular Movements: Extraocular movements intact.      Conjunctiva/sclera: Conjunctivae normal.      Pupils: Pupils are equal, round, and reactive to light.   Cardiovascular:      Rate and Rhythm: Normal rate and regular rhythm.   Pulmonary:      Effort: Pulmonary effort is normal. No respiratory distress.      Breath sounds: Normal breath sounds.   Abdominal:      General: There is no distension.      Palpations: Abdomen is soft.   Musculoskeletal:         General: No swelling. Normal range of motion.      Cervical back: Normal range of motion and neck supple.      Right lower leg: No edema.      Left lower leg: No edema.   Lymphadenopathy:      Cervical: No cervical adenopathy.   Skin:     General: Skin is warm and dry.      Coloration: Skin is not jaundiced.      Findings: No lesion.   Neurological:      General: No focal deficit present.      Mental Status: He is alert and oriented to person, place, and time.      Motor: No weakness.   Psychiatric:         Mood and Affect: Mood normal.         Behavior: Behavior normal.         Thought Content: Thought content normal.         Labs:   Recent Results (from the past 48 hour(s))   CBC Oncology    Collection Time: 03/16/23 11:43 AM   Result Value Ref Range    WBC 7.75 3.90 - 12.70 K/uL    RBC 3.52 (L) 4.60 - 6.20 M/uL    Hemoglobin 10.4 (L) 14.0 - 18.0 g/dL    Hematocrit 34.1 (L) 40.0 - 54.0 %    MCV 97 82 - 98 fL    MCH 29.5 27.0 - 31.0 pg    MCHC 30.5 (L) 32.0 - 36.0 g/dL    RDW 13.1 11.5 - 14.5 %    Platelets 253 150 - 450 K/uL    MPV 9.7 9.2 - 12.9 fL    Gran # (ANC) 5.7 1.8 - 7.7 K/uL    Immature Grans (Abs) 0.03 0.00 - 0.04 K/uL   Comprehensive Metabolic Panel     Collection Time: 03/16/23 11:43 AM   Result Value Ref Range    Sodium 135 (L) 136 - 145 mmol/L    Potassium 5.2 (H) 3.5 - 5.1 mmol/L    Chloride 103 95 - 110 mmol/L    CO2 22 (L) 23 - 29 mmol/L    Glucose 240 (H) 70 - 110 mg/dL    BUN 18 6 - 20 mg/dL    Creatinine 1.2 0.5 - 1.4 mg/dL    Calcium 9.3 8.7 - 10.5 mg/dL    Total Protein 7.0 6.0 - 8.4 g/dL    Albumin 3.6 3.5 - 5.2 g/dL    Total Bilirubin 0.5 0.1 - 1.0 mg/dL    Alkaline Phosphatase 105 55 - 135 U/L    AST 19 10 - 40 U/L    ALT 26 10 - 44 U/L    Anion Gap 10 8 - 16 mmol/L    eGFR >60.0 >60 mL/min/1.73 m^2   Prostate Specific Antigen, Diagnostic    Collection Time: 03/16/23 11:43 AM   Result Value Ref Range    PSA Diagnostic <0.01 0.00 - 4.00 ng/mL          Imaging:     NM Gastric Emptying  Narrative: EXAMINATION:  NM GASTRIC EMPTYING    CLINICAL HISTORY:  evaluate for gastroparesis;  Nausea with vomiting, unspecified    TECHNIQUE:  1 mCi of technetium labeled sulfur colloid was administered an oral medial.  Anterior and posterior imaging was acquired in order to calculate a geometric mean.    FINDINGS:  There is 96% emptying after 87 minutes.  Impression: Normal gastric emptying time.    Electronically signed by: Anshul Ayon MD  Date:    12/01/2022  Time:    12:16            Diagnoses:       1. Prostate cancer    2. Type 2 diabetes mellitus with diabetic polyneuropathy, without long-term current use of insulin    3. Hypertension, unspecified type    4. Gastroparesis due to DM    5. Diabetic peripheral neuropathy    6. Depression, unspecified depression type          Assessment and Plan:     1. Prostate cancer  Overview:  Intermediate risk [place] 4+3 disease sp R1 prostatectomy 2015 with adjuvant RT presumably to prostate bed. Now with PSA recurrence and PSMA imaging with regional maren metastases and limited intraabdominal maren mets.Started intensified hormonal therapy with ADT + abiraterone 02/2023. Plan for further consideration of RT.    Assessment  & Plan:  - Next ADT shot 3/29/23 with Dr. Edmond  - Con't abiraterone 250mg daily with low fat meal / prednisone 5mg daily  - Con't labs q2 weeks following abiraterone start x 2 months; then monthly  - Con't vitamin D/ Calcium - using over the counter  - FU DEXA scan results  - Radiation oncology trying to obtain prior RT course    Orders:  -     Ambulatory referral/consult to Genetics; Future; Expected date: 03/23/2023  -     Care Companion Enrollment Chemotherapy  -     Assign Chemotherapy Program Consent Questionaire    2. Type 2 diabetes mellitus with diabetic polyneuropathy, without long-term current use of insulin  Overview:  Home medications include metformin. No insulin use. Complicated by peripheral neuropathy and potentially gastroparesis.    Assessment & Plan:  - Monitor blood sugars daily  - Discuss elevated numbers with his PCP  - A1c with next visit    Orders:  -     Hemoglobin A1c; Future; Expected date: 03/16/2023    3. Hypertension, unspecified type  Overview:  Home medications include carvedilol, amlodipine, and losartan.     Assessment & Plan:  - Well controlled today    Orders:  -     Care Companion Enrollment Chemotherapy  -     Assign Chemotherapy Program Consent Questionaire    4. Gastroparesis due to DM  Overview:  Episodes of intractable N/V and abdominal pain every few months thought due to gastroparesis +/- cannibas hyperemesis.    Assessment & Plan:  - Currently stable      5. Diabetic peripheral neuropathy  Overview:  Home medications include gabapentin    Assessment & Plan:  - Con't home medications      6. Depression, unspecified depression type  Overview:  Following with oncology psychology    Assessment & Plan:  - Stable             Route Chart for Scheduling    Med Onc Chart Routing      Follow up with physician 4 weeks. Merlyn 4 weeks   Follow up with DONAVAN    Infusion scheduling note    Injection scheduling note    Labs CBC, CMP and PSA   Scheduling:  Preferred lab:  Lab  interval:  next 3/30/23 and with MD visit 4/13/23   Imaging    Pharmacy appointment    Other referrals           Treatment Plan Information   OP ABIRATERONE DAILY PREDNISONE BID   Edward Zuleta MD   Upcoming Treatment Dates - OP ABIRATERONE DAILY PREDNISONE BID    No upcoming days in selected categories.       Edward Zuleta MD  Hematology/Oncology  [facility]

## 2023-03-23 ENCOUNTER — PATIENT MESSAGE (OUTPATIENT)
Dept: UROLOGY | Facility: CLINIC | Age: 58
End: 2023-03-23
Payer: MEDICAID

## 2023-03-29 ENCOUNTER — OFFICE VISIT (OUTPATIENT)
Dept: UROLOGY | Facility: CLINIC | Age: 58
End: 2023-03-29
Payer: MEDICAID

## 2023-03-29 VITALS
HEART RATE: 86 BPM | SYSTOLIC BLOOD PRESSURE: 122 MMHG | HEIGHT: 71 IN | BODY MASS INDEX: 31.36 KG/M2 | OXYGEN SATURATION: 97 % | WEIGHT: 224 LBS | DIASTOLIC BLOOD PRESSURE: 70 MMHG

## 2023-03-29 DIAGNOSIS — C61 PROSTATE CANCER METASTATIC TO INTRAPELVIC LYMPH NODE: Primary | ICD-10-CM

## 2023-03-29 DIAGNOSIS — R97.20 ELEVATED PSA, LESS THAN 10 NG/ML: ICD-10-CM

## 2023-03-29 DIAGNOSIS — Z80.42 FAMILY HISTORY OF PROSTATE CANCER IN FATHER: ICD-10-CM

## 2023-03-29 DIAGNOSIS — Z85.46 PERSONAL HISTORY OF MALIGNANT NEOPLASM OF PROSTATE: ICD-10-CM

## 2023-03-29 DIAGNOSIS — C77.5 PROSTATE CANCER METASTATIC TO INTRAPELVIC LYMPH NODE: Primary | ICD-10-CM

## 2023-03-29 DIAGNOSIS — C61 PROSTATE CANCER: ICD-10-CM

## 2023-03-29 PROCEDURE — 3008F BODY MASS INDEX DOCD: CPT | Mod: CPTII,,, | Performed by: UROLOGY

## 2023-03-29 PROCEDURE — 1160F RVW MEDS BY RX/DR IN RCRD: CPT | Mod: CPTII,,, | Performed by: UROLOGY

## 2023-03-29 PROCEDURE — 3078F PR MOST RECENT DIASTOLIC BLOOD PRESSURE < 80 MM HG: ICD-10-PCS | Mod: CPTII,,, | Performed by: UROLOGY

## 2023-03-29 PROCEDURE — 99215 OFFICE O/P EST HI 40 MIN: CPT | Mod: S$PBB,,, | Performed by: UROLOGY

## 2023-03-29 PROCEDURE — 99999 PR PBB SHADOW E&M-EST. PATIENT-LVL IV: ICD-10-PCS | Mod: PBBFAC,,, | Performed by: UROLOGY

## 2023-03-29 PROCEDURE — 3074F SYST BP LT 130 MM HG: CPT | Mod: CPTII,,, | Performed by: UROLOGY

## 2023-03-29 PROCEDURE — 99214 OFFICE O/P EST MOD 30 MIN: CPT | Mod: PBBFAC,25,PO | Performed by: UROLOGY

## 2023-03-29 PROCEDURE — 99215 PR OFFICE/OUTPT VISIT, EST, LEVL V, 40-54 MIN: ICD-10-PCS | Mod: S$PBB,,, | Performed by: UROLOGY

## 2023-03-29 PROCEDURE — 4010F ACE/ARB THERAPY RXD/TAKEN: CPT | Mod: CPTII,,, | Performed by: UROLOGY

## 2023-03-29 PROCEDURE — 1159F PR MEDICATION LIST DOCUMENTED IN MEDICAL RECORD: ICD-10-PCS | Mod: CPTII,,, | Performed by: UROLOGY

## 2023-03-29 PROCEDURE — 99999 PR PBB SHADOW E&M-EST. PATIENT-LVL IV: CPT | Mod: PBBFAC,,, | Performed by: UROLOGY

## 2023-03-29 PROCEDURE — 96402 CHEMO HORMON ANTINEOPL SQ/IM: CPT | Mod: PBBFAC,PO

## 2023-03-29 PROCEDURE — 3074F PR MOST RECENT SYSTOLIC BLOOD PRESSURE < 130 MM HG: ICD-10-PCS | Mod: CPTII,,, | Performed by: UROLOGY

## 2023-03-29 PROCEDURE — 4010F PR ACE/ARB THEARPY RXD/TAKEN: ICD-10-PCS | Mod: CPTII,,, | Performed by: UROLOGY

## 2023-03-29 PROCEDURE — 3008F PR BODY MASS INDEX (BMI) DOCUMENTED: ICD-10-PCS | Mod: CPTII,,, | Performed by: UROLOGY

## 2023-03-29 PROCEDURE — 1160F PR REVIEW ALL MEDS BY PRESCRIBER/CLIN PHARMACIST DOCUMENTED: ICD-10-PCS | Mod: CPTII,,, | Performed by: UROLOGY

## 2023-03-29 PROCEDURE — 3078F DIAST BP <80 MM HG: CPT | Mod: CPTII,,, | Performed by: UROLOGY

## 2023-03-29 PROCEDURE — 1159F MED LIST DOCD IN RCRD: CPT | Mod: CPTII,,, | Performed by: UROLOGY

## 2023-03-29 RX ORDER — OXYBUTYNIN CHLORIDE 15 MG/1
15 TABLET, EXTENDED RELEASE ORAL DAILY
Qty: 90 TABLET | Refills: 3 | Status: SHIPPED | OUTPATIENT
Start: 2023-03-29 | End: 2023-06-28 | Stop reason: SINTOL

## 2023-03-29 RX ORDER — VENLAFAXINE 25 MG/1
TABLET ORAL
COMMUNITY
End: 2023-05-12

## 2023-03-29 RX ORDER — AMITRIPTYLINE HYDROCHLORIDE 25 MG/1
25 TABLET, FILM COATED ORAL NIGHTLY
COMMUNITY
Start: 2023-03-06 | End: 2023-09-08 | Stop reason: ALTCHOICE

## 2023-03-29 RX ADMIN — TRIPTORELIN PAMOATE 11.25 MG: KIT at 10:03

## 2023-03-29 NOTE — PATIENT INSTRUCTIONS
Trelstar injection today  Follow-up in 3 months with PSA  Trelstar injection again in 3 months.  Patient to continue current management from hematology oncology physician Dr. Zuleta.  Change oxybutynin 10 mg XL to oxybutynin 15 mg XL daily.

## 2023-03-29 NOTE — PROGRESS NOTES
Subjective:      Patient ID: Mauro Benitez is a 57 y.o. male.    Chief Complaint: No chief complaint on file.     Mr. Wade is a 57-year-old gentleman who is status post radical retropubic prostatectomy in 2015 at [facility] in [place].  The patient had moved to [place] and was seeing Dr. Roberts for medical follow-up and was found have an elevated PSA.  He was referred for evaluation and nurse practitioner Richie ordered a PSMA Pylarify 18 scan which showed positive lymph nodes in the pelvis and periaortic area.  The patient was started on Casodex 50 mg daily for 2 weeks.  The patient then underwent Trelstar injection.  He is responded real well and is here for 12 week follow-up with injection.  His PSA dropped from 2.8-0.02.  The patient is having symptoms of hot flashes which are fairly intense but no other complaints.  The patient was since sent for consultation with hematology oncologist Dr. Zuleta.  The patient has since been put on steroids and abiraterone.  His PSA has dropped to less than 0.01.  The patient has some symptoms of nausea which is possibly due to diabetic gastroparesis.  The patient has divided his morning dose of medicine into 2 separate dosage 1 he takes prior to meal 1 2 hours after eating and this way he has been able to keep both of his doses down.  The patient had gained some weight with the starting of the prednisone however on presentation today is lost 2 lb.   Review of Systems   Constitutional:  Positive for activity change. Negative for appetite change and fatigue.   HENT: Negative.     Cardiovascular: Negative.    Gastrointestinal:  Positive for nausea and vomiting.   Genitourinary:  Positive for frequency.   Musculoskeletal:  Positive for arthralgias and myalgias.   Neurological:  Positive for weakness.    Objective:     Physical Exam  Vitals and nursing note reviewed.   Constitutional:       General: He is not in acute distress.     Appearance: Normal  appearance. He is well-developed. He is obese. He is not ill-appearing, toxic-appearing or diaphoretic.   HENT:      Head: Normocephalic and atraumatic.      Right Ear: External ear normal.      Left Ear: External ear normal.      Nose: Nose normal.      Mouth/Throat:      Mouth: Mucous membranes are moist.      Pharynx: Oropharynx is clear. No oropharyngeal exudate or posterior oropharyngeal erythema.   Eyes:      General: No scleral icterus.        Right eye: No discharge.         Left eye: No discharge.      Extraocular Movements: Extraocular movements intact.      Conjunctiva/sclera: Conjunctivae normal.      Pupils: Pupils are equal, round, and reactive to light.   Cardiovascular:      Rate and Rhythm: Normal rate and regular rhythm.      Heart sounds: Normal heart sounds.   Pulmonary:      Effort: Pulmonary effort is normal.      Breath sounds: Normal breath sounds.   Abdominal:      General: Bowel sounds are normal.      Palpations: Abdomen is soft.      Tenderness: There is no right CVA tenderness or left CVA tenderness.   Genitourinary:     Penis: Normal.       Testes: Normal.      Comments: Prostate abscess  Musculoskeletal:         General: Normal range of motion.      Cervical back: Normal range of motion and neck supple.   Skin:     General: Skin is warm and dry.      Capillary Refill: Capillary refill takes less than 2 seconds.   Neurological:      General: No focal deficit present.      Mental Status: He is alert and oriented to person, place, and time. Mental status is at baseline.      Gait: Gait normal.      Deep Tendon Reflexes: Reflexes are normal and symmetric. Reflexes normal.   Psychiatric:         Mood and Affect: Mood normal.         Behavior: Behavior normal.         Thought Content: Thought content normal.         Judgment: Judgment normal.      Assessment:      1. Prostate cancer metastatic to intrapelvic lymph node    2. Elevated PSA, less than 10 ng/ml    3. Personal history of  malignant neoplasm of prostate    4. Family history of prostate cancer in father    5. Prostate cancer      Plan:     Patient Instructions   Trelstar injection today  Follow-up in 3 months with PSA  Trelstar injection again in 3 months.  Patient to continue current management from hematology oncology physician Dr. Zuleta.  Change oxybutynin 10 mg XL to oxybutynin 15 mg XL daily.

## 2023-03-30 ENCOUNTER — LAB VISIT (OUTPATIENT)
Dept: LAB | Facility: HOSPITAL | Age: 58
End: 2023-03-30
Attending: HOSPITALIST
Payer: MEDICAID

## 2023-03-30 DIAGNOSIS — C61 PROSTATE CANCER: ICD-10-CM

## 2023-03-30 LAB
ALBUMIN SERPL BCP-MCNC: 3.7 G/DL (ref 3.5–5.2)
ALP SERPL-CCNC: 104 U/L (ref 55–135)
ALT SERPL W/O P-5'-P-CCNC: 36 U/L (ref 10–44)
ANION GAP SERPL CALC-SCNC: 7 MMOL/L (ref 8–16)
AST SERPL-CCNC: 27 U/L (ref 10–40)
BILIRUB SERPL-MCNC: 0.6 MG/DL (ref 0.1–1)
BUN SERPL-MCNC: 17 MG/DL (ref 6–20)
CALCIUM SERPL-MCNC: 9.4 MG/DL (ref 8.7–10.5)
CHLORIDE SERPL-SCNC: 106 MMOL/L (ref 95–110)
CO2 SERPL-SCNC: 23 MMOL/L (ref 23–29)
COMPLEXED PSA SERPL-MCNC: <0.01 NG/ML (ref 0–4)
CREAT SERPL-MCNC: 1.2 MG/DL (ref 0.5–1.4)
ERYTHROCYTE [DISTWIDTH] IN BLOOD BY AUTOMATED COUNT: 13.2 % (ref 11.5–14.5)
EST. GFR  (NO RACE VARIABLE): >60 ML/MIN/1.73 M^2
GLUCOSE SERPL-MCNC: 327 MG/DL (ref 70–110)
HCT VFR BLD AUTO: 35.8 % (ref 40–54)
HGB BLD-MCNC: 10.9 G/DL (ref 14–18)
IMM GRANULOCYTES # BLD AUTO: 0.03 K/UL (ref 0–0.04)
MCH RBC QN AUTO: 29.2 PG (ref 27–31)
MCHC RBC AUTO-ENTMCNC: 30.4 G/DL (ref 32–36)
MCV RBC AUTO: 96 FL (ref 82–98)
NEUTROPHILS # BLD AUTO: 5.4 K/UL (ref 1.8–7.7)
PLATELET # BLD AUTO: 329 K/UL (ref 150–450)
PMV BLD AUTO: 10 FL (ref 9.2–12.9)
POTASSIUM SERPL-SCNC: 5 MMOL/L (ref 3.5–5.1)
PROT SERPL-MCNC: 7.3 G/DL (ref 6–8.4)
RBC # BLD AUTO: 3.73 M/UL (ref 4.6–6.2)
SODIUM SERPL-SCNC: 136 MMOL/L (ref 136–145)
WBC # BLD AUTO: 7.32 K/UL (ref 3.9–12.7)

## 2023-03-30 PROCEDURE — 84153 ASSAY OF PSA TOTAL: CPT | Performed by: HOSPITALIST

## 2023-03-30 PROCEDURE — 80053 COMPREHEN METABOLIC PANEL: CPT | Performed by: HOSPITALIST

## 2023-03-30 PROCEDURE — 36415 COLL VENOUS BLD VENIPUNCTURE: CPT | Performed by: HOSPITALIST

## 2023-03-30 PROCEDURE — 85027 COMPLETE CBC AUTOMATED: CPT | Performed by: HOSPITALIST

## 2023-04-10 ENCOUNTER — SPECIALTY PHARMACY (OUTPATIENT)
Dept: PHARMACY | Facility: CLINIC | Age: 58
End: 2023-04-10
Payer: MEDICAID

## 2023-04-10 NOTE — TELEPHONE ENCOUNTER
Specialty Pharmacy - Refill Coordination    Specialty Medication Orders Linked to Encounter      Flowsheet Row Most Recent Value   Medication #1 predniSONE (DELTASONE) 5 MG tablet (Order#[id number], Rx#[id number])   Medication #2 abiraterone (ZYTIGA) 250 mg Tab (Order#[id number], Rx#[id number])            Refill Questions - Documented Responses      Flowsheet Row Most Recent Value   Patient Availability and HIPAA Verification    Does patient want to proceed with activity? Yes   HIPAA/medical authority confirmed? Yes   Relationship to patient of person spoken to? Self   Refill Screening Questions    Changes to allergies? No   Changes to medications? No   New conditions since last clinic visit? No   Unplanned office visit, urgent care, ED, or hospital admission in the last 4 weeks? No   How does patient/caregiver feel medication is working? Good   Financial problems or insurance changes? No   How many doses of your specialty medications were missed in the last 4 weeks? 0   Would patient like to speak to a pharmacist? No   When does the patient need to receive the medication? 04/16/23   Refill Delivery Questions    How will the patient receive the medication? [facility]   When does the patient need to receive the medication? 04/16/23   Shipping Address Home   Address in [facility] confirmed and updated if neccessary? Yes   Expected Copay ($) 0   Is the patient able to afford the medication copay? Yes   Payment Method zero copay   Days supply of Refill 30   Supplies needed? No supplies needed   Refill activity completed? Yes   Refill activity plan Refill scheduled   Shipment/Pickup Date: 04/10/23            Current Outpatient Medications   Medication Sig    abiraterone (ZYTIGA) 250 mg Tab Take 1 tablet (250 mg total) by mouth once daily Take as directed with a low fat meal.    acetaminophen (TYLENOL) 500 MG tablet Take 1,500 mg by mouth daily as needed for Pain.    amitriptyline (ELAVIL) 25 MG tablet Take 25 mg by  mouth every evening.    amLODIPine (NORVASC) 10 MG tablet Take 10 mg by mouth every evening.    aspirin (ECOTRIN) 81 MG EC tablet Aspir-81 mg tablet,delayed release   Take 1 tablet every day by oral route in the morning for 30 days.    atorvastatin (LIPITOR) 20 MG tablet Take 20 mg by mouth once daily.    blood sugar diagnostic (ONETOUCH VERIO TEST STRIPS MISC) OneTouch Verio test strips    calcium carbonate (OS-CALOS) 500 mg calcium (1,250 mg) tablet Take 2 tablets (1,000 mg total) by mouth once daily.    carvedilol (COREG) 12.5 MG tablet Take 12.5 mg by mouth 2 (two) times daily with meals.    cholecalciferol, vitamin D3, (VITAMIN D3) 25 mcg (1,000 unit) capsule Take 1 capsule (1,000 Units total) by mouth once daily.    cyproheptadine (PERIACTIN) 4 mg tablet Take by mouth.    fluticasone propionate (FLONASE) 50 mcg/actuation nasal spray fluticasone propionate 50 mcg/actuation nasal spray,suspension   SPRAY 2 SPRAYS EVERY DAY BY INTRANASAL ROUTE AS NEEDED.    gabapentin (NEURONTIN) 400 MG capsule Take 400 mg by mouth 3 (three) times daily.    loratadine (CLARITIN) 10 mg tablet loratadine 10 mg tablet    losartan (COZAAR) 100 MG tablet Take 100 mg by mouth once daily.    metFORMIN (GLUCOPHAGE) 500 MG tablet Take 500 mg by mouth 2 (two) times daily with meals.    omeprazole (PRILOSEC) 20 MG capsule omeprazole 20 mg capsule,delayed release   TAKE 1 TABLET BY MOUTH EVERY DAY    oxybutynin (DITROPAN XL) 15 MG TR24 Take 1 tablet (15 mg total) by mouth once daily.    predniSONE (DELTASONE) 5 MG tablet Take 1 tablet (5 mg total) by mouth once daily. Take as directed with water on an empty stomach.    sildenafiL (VIAGRA) 100 MG tablet Take 100 mg by mouth daily as needed for Erectile Dysfunction.    venlafaxine (EFFEXOR) 25 MG Tab venlafaxine 25 mg tablet   TAKE 1 TABLET (25 MG TOTAL) BY MOUTH 2 (TWO) TIMES DAILY.   Last reviewed on 3/29/2023 10:02 AM by Edward Edmond MD    Review of patient's allergies indicates:    Allergen Reactions    Lisinopril Shortness Of Breath    Last reviewed on  3/29/2023 10:02 AM by Edward Edmond      Tasks added this encounter   5/9/2023 - Refill Call (Auto Added)   Tasks due within next 3 months   No tasks due.     Edmar Duggan, PharmD  [facility]  [address]  [facility] [address]  Phone: [phone]  Fax: [phone]

## 2023-04-14 ENCOUNTER — LAB VISIT (OUTPATIENT)
Dept: LAB | Facility: HOSPITAL | Age: 58
End: 2023-04-14
Attending: HOSPITALIST
Payer: MEDICAID

## 2023-04-14 ENCOUNTER — OFFICE VISIT (OUTPATIENT)
Dept: HEMATOLOGY/ONCOLOGY | Facility: CLINIC | Age: 58
End: 2023-04-14
Payer: MEDICAID

## 2023-04-14 VITALS
HEIGHT: 71 IN | HEART RATE: 94 BPM | DIASTOLIC BLOOD PRESSURE: 73 MMHG | BODY MASS INDEX: 30.92 KG/M2 | SYSTOLIC BLOOD PRESSURE: 136 MMHG | WEIGHT: 220.88 LBS | RESPIRATION RATE: 18 BRPM | OXYGEN SATURATION: 97 %

## 2023-04-14 DIAGNOSIS — I10 HYPERTENSION, UNSPECIFIED TYPE: ICD-10-CM

## 2023-04-14 DIAGNOSIS — E11.42 TYPE 2 DIABETES MELLITUS WITH DIABETIC POLYNEUROPATHY, WITHOUT LONG-TERM CURRENT USE OF INSULIN: ICD-10-CM

## 2023-04-14 DIAGNOSIS — C77.5 PROSTATE CANCER METASTATIC TO INTRAPELVIC LYMPH NODE: Primary | ICD-10-CM

## 2023-04-14 DIAGNOSIS — C61 PROSTATE CANCER METASTATIC TO INTRAPELVIC LYMPH NODE: Primary | ICD-10-CM

## 2023-04-14 DIAGNOSIS — C61 PROSTATE CANCER: ICD-10-CM

## 2023-04-14 PROBLEM — Z80.42 FAMILY HISTORY OF PROSTATE CANCER IN FATHER: Status: RESOLVED | Noted: 2023-03-29 | Resolved: 2023-04-14

## 2023-04-14 LAB
ALBUMIN SERPL BCP-MCNC: 3.6 G/DL (ref 3.5–5.2)
ALP SERPL-CCNC: 97 U/L (ref 55–135)
ALT SERPL W/O P-5'-P-CCNC: 19 U/L (ref 10–44)
ANION GAP SERPL CALC-SCNC: 12 MMOL/L (ref 8–16)
AST SERPL-CCNC: 16 U/L (ref 10–40)
BILIRUB SERPL-MCNC: 0.7 MG/DL (ref 0.1–1)
BUN SERPL-MCNC: 26 MG/DL (ref 6–20)
CALCIUM SERPL-MCNC: 9.6 MG/DL (ref 8.7–10.5)
CHLORIDE SERPL-SCNC: 101 MMOL/L (ref 95–110)
CO2 SERPL-SCNC: 25 MMOL/L (ref 23–29)
COMPLEXED PSA SERPL-MCNC: <0.01 NG/ML (ref 0–4)
CREAT SERPL-MCNC: 1.2 MG/DL (ref 0.5–1.4)
ERYTHROCYTE [DISTWIDTH] IN BLOOD BY AUTOMATED COUNT: 13.3 % (ref 11.5–14.5)
EST. GFR  (NO RACE VARIABLE): >60 ML/MIN/1.73 M^2
GLUCOSE SERPL-MCNC: 273 MG/DL (ref 70–110)
HCT VFR BLD AUTO: 35.5 % (ref 40–54)
HGB BLD-MCNC: 10.6 G/DL (ref 14–18)
IMM GRANULOCYTES # BLD AUTO: 0.04 K/UL (ref 0–0.04)
MCH RBC QN AUTO: 28.8 PG (ref 27–31)
MCHC RBC AUTO-ENTMCNC: 29.9 G/DL (ref 32–36)
MCV RBC AUTO: 97 FL (ref 82–98)
NEUTROPHILS # BLD AUTO: 6.9 K/UL (ref 1.8–7.7)
PLATELET # BLD AUTO: 324 K/UL (ref 150–450)
PMV BLD AUTO: 9.8 FL (ref 9.2–12.9)
POTASSIUM SERPL-SCNC: 5.4 MMOL/L (ref 3.5–5.1)
PROT SERPL-MCNC: 7.2 G/DL (ref 6–8.4)
RBC # BLD AUTO: 3.68 M/UL (ref 4.6–6.2)
SODIUM SERPL-SCNC: 138 MMOL/L (ref 136–145)
WBC # BLD AUTO: 10.35 K/UL (ref 3.9–12.7)

## 2023-04-14 PROCEDURE — 99214 PR OFFICE/OUTPT VISIT, EST, LEVL IV, 30-39 MIN: ICD-10-PCS | Mod: S$PBB,,, | Performed by: HOSPITALIST

## 2023-04-14 PROCEDURE — 3078F PR MOST RECENT DIASTOLIC BLOOD PRESSURE < 80 MM HG: ICD-10-PCS | Mod: CPTII,,, | Performed by: HOSPITALIST

## 2023-04-14 PROCEDURE — 99214 OFFICE O/P EST MOD 30 MIN: CPT | Mod: S$PBB,,, | Performed by: HOSPITALIST

## 2023-04-14 PROCEDURE — 3008F PR BODY MASS INDEX (BMI) DOCUMENTED: ICD-10-PCS | Mod: CPTII,,, | Performed by: HOSPITALIST

## 2023-04-14 PROCEDURE — 99213 OFFICE O/P EST LOW 20 MIN: CPT | Mod: PBBFAC | Performed by: HOSPITALIST

## 2023-04-14 PROCEDURE — 99999 PR PBB SHADOW E&M-EST. PATIENT-LVL III: CPT | Mod: PBBFAC,,, | Performed by: HOSPITALIST

## 2023-04-14 PROCEDURE — 85027 COMPLETE CBC AUTOMATED: CPT | Performed by: HOSPITALIST

## 2023-04-14 PROCEDURE — 3075F PR MOST RECENT SYSTOLIC BLOOD PRESS GE 130-139MM HG: ICD-10-PCS | Mod: CPTII,,, | Performed by: HOSPITALIST

## 2023-04-14 PROCEDURE — 36415 COLL VENOUS BLD VENIPUNCTURE: CPT | Performed by: HOSPITALIST

## 2023-04-14 PROCEDURE — 80053 COMPREHEN METABOLIC PANEL: CPT | Performed by: HOSPITALIST

## 2023-04-14 PROCEDURE — 3008F BODY MASS INDEX DOCD: CPT | Mod: CPTII,,, | Performed by: HOSPITALIST

## 2023-04-14 PROCEDURE — 4010F PR ACE/ARB THEARPY RXD/TAKEN: ICD-10-PCS | Mod: CPTII,,, | Performed by: HOSPITALIST

## 2023-04-14 PROCEDURE — 99999 PR PBB SHADOW E&M-EST. PATIENT-LVL III: ICD-10-PCS | Mod: PBBFAC,,, | Performed by: HOSPITALIST

## 2023-04-14 PROCEDURE — 3075F SYST BP GE 130 - 139MM HG: CPT | Mod: CPTII,,, | Performed by: HOSPITALIST

## 2023-04-14 PROCEDURE — 84153 ASSAY OF PSA TOTAL: CPT | Performed by: HOSPITALIST

## 2023-04-14 PROCEDURE — 3078F DIAST BP <80 MM HG: CPT | Mod: CPTII,,, | Performed by: HOSPITALIST

## 2023-04-14 PROCEDURE — 4010F ACE/ARB THERAPY RXD/TAKEN: CPT | Mod: CPTII,,, | Performed by: HOSPITALIST

## 2023-04-14 RX ORDER — CARVEDILOL 25 MG/1
25 TABLET ORAL 2 TIMES DAILY
Qty: 60 TABLET | Refills: 11 | Status: SHIPPED | OUTPATIENT
Start: 2023-04-14 | End: 2024-03-20

## 2023-04-14 NOTE — ASSESSMENT & PLAN NOTE
Tolerating treatment well with minimal side effecs. BP and sugars slightly elevated.    - ADT with Triptorelin 11.25mg 3/29/23; next 6/21/23  - Continue /5  - Safety labs in 4 weeks    - Con't OTC Ca/D  - DEXA 3/27/23 - Low risk osteopenia; repeat 2 years    - Needs to schedule genetics counseling; provided pt phone number to help schedule  - Send [facility] xF with next labs    - T/B with Rad Onc

## 2023-04-14 NOTE — ASSESSMENT & PLAN NOTE
- Followign with his PCP, who recently increased his metformin to 1000 po bid  - Has PCP FU in May

## 2023-04-14 NOTE — Clinical Note
I think you were trying to find record for any prior RT to determine if he could get RT to his maren relapse? Any luck, and do you need him back in clinic? Thanks! Edward

## 2023-04-14 NOTE — PATIENT INSTRUCTIONS
Overall you are tolerating hormone therapy well with expected side effects including hot flashes. You continue to have excellent PSA response.      - Increase carvedilol to 25mg twice daily; continue to monitor blood pressure daily  - Con't abiraterone 250mg daily with breakfast and prednisone 5mg daily  - Next ADT shot due in 06/2023 with Shaw Edmond.  - Repeat labs 4 weeks with clinic visit  - Obtain blood pressure cuff from first floor O-bar for monitoring at home  - To schedule genetics consultation, call She Flores at [phone]

## 2023-04-14 NOTE — PROGRESS NOTES
MEDICAL ONCOLOGY FOLLOW-UP VISIT.     Best Contact Phone Number(s): [phone] (home)      Cancer/Stage/TNM:    Cancer Staging   Prostate cancer metastatic to intrapelvic lymph node  Staging form: Prostate, AJCC 8th Edition  - Clinical: Stage IVB (cTX, cN1, cM1a, PSA: 2.8, Grade Group: 3) - Signed by Edward Zuleta MD on 2/1/2023       Reason for visit: Mauro Benitez is a 57 y.o. male history of prostate cancer sp RALP with positive margins 11/2/2015. He was subsequently found to have BCR with PSA 2.8 06/2022 with maren relapse on PSMA imaging and subsequently started hormonal therapy 10/2022.  Medical course notable for recurrent nausea and vomiting thought due to gastroparesis. He presents to medical oncology clinic for routine follow up on intensified ADT with abiraterone 250/5 since 02/2023 with additional consideration of  RT therapy.      Interval History:     Overall feeling generally well. Monitoring blood pressures and blood sugar at home. SBP consistently 130's-150's and blood sugars in high 100's to low 200's. Recenlty increased metformin to 1000mg twice daily.    Energy is good.  Hot flashes are decreasing in frequency and severity. Continue daily but tolerable. Also increased oxybutynin dose with some improvement in urinary overlfow incontinence, primarily with bending over or picking up heavy objects. Has urinary urgency during the day. Has no significant nocturia. No breast tenderness or pain.     Reports good compliance with abiraterone 250 daily with low fat meal and prednisone 5mg once daily. No palptiations. No CP, No leg swelling. Notes weight is down a few pounds over the last few months.            Oncology History   Prostate cancer metastatic to intrapelvic lymph node   10/12/2015 Imaging Significant Findings    CT a/p: No evidence of distant or maren metastatic disease    Bone scan: No evidence of metastatic disease. 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     11/2/2015 Surgery    Radical prostatectomy at [facility] in [place]. Pathology with prostate adenocarcinoma up to [place] 4+3 with associated PNI. Positive margin. 0/4 LN involved. Staged pT3aN0     2/23/2016 Imaging Significant Findings    Nuclear bone scan: No evidence of metastatic disease. Note made of uptake in left supraorbital region thought c/w left frontal sinusitis along with uptake in mandible and maxilla c/w periosteal disease.     4/27/2021 Imaging Significant Findings    Bone scan:  There is abnormal increased radiotracer activity identified involving the maxilla and multiple paranasal sinuses likely related to periodontal disease and sinus disease. Recommend clinical correlation and further evaluation with maxillofacial CT as warranted.     6/6/2022 Tumor Markers    PSA 2.8     6/21/2022 Imaging Significant Findings    PSMA PET-CT: In this patient with prostate malignancy status post prostatectomy, there are tracer-avid left para-aortic, presacral/left common iliac, and left external iliac chain nodes as described above, concerning for recurrent metastatic disease.     9/16/2022 -  Hormone Therapy    Start bicalutamide 50mg po daily     10/3/2022 -  Hormone Therapy    Trelstar 11.25mg IM x1     12/19/2022 Tumor Markers    PSA 0.02     1/4/2023 -  Hormone Therapy    Trelstart 11.25mg IM x1 with Dr. Edmond     2/1/2023 -  Chemotherapy    Treatment Summary   Plan Name: OP ABIRATERONE DAILY PREDNISONE BID  Treatment Goal: Curative  Status: Active  Start Date: 2/1/2023  End Date: 2/1/2023  Provider: Edward Zuleta MD  Chemotherapy: abiraterone (ZYTIGA) 250 mg Tab, 250 mg (100 % of original dose 250 mg), Oral, Daily, 1 of 1 cycle, Start date: 2/1/2023, End date: --  Dose modification: 250 mg (original dose 250 mg, Cycle 1)       3/27/2023 Imaging Significant Findings    DEXA - Low risk osteopenia; repeat 2 years     3/29/2023 -  Hormone  "Therapy    Triptorelin 11.25mg            Physical Exam:   /73 (BP Location: Left arm, Patient Position: Sitting, BP Method: Medium (Automatic))   Pulse 94   Resp 18   Ht 5' 11" (1.803 m)   Wt 100.2 kg (220 lb 14.4 oz)   SpO2 97%   BMI 30.81 kg/m²      ECOG Performance Status: (foot note - ECOG PS provided by Eastern Cooperative Oncology Group) 0 - Asymptomatic    Physical Exam  Constitutional:       General: He is not in acute distress.     Appearance: Normal appearance.   HENT:      Head: Normocephalic.      Nose: Nose normal.      Mouth/Throat:      Mouth: Mucous membranes are moist.      Pharynx: Oropharynx is clear.   Eyes:      Extraocular Movements: Extraocular movements intact.      Conjunctiva/sclera: Conjunctivae normal.      Pupils: Pupils are equal, round, and reactive to light.   Cardiovascular:      Rate and Rhythm: Normal rate and regular rhythm.   Pulmonary:      Effort: Pulmonary effort is normal. No respiratory distress.      Breath sounds: Normal breath sounds.   Abdominal:      General: There is no distension.      Palpations: Abdomen is soft.   Musculoskeletal:         General: No swelling. Normal range of motion.      Cervical back: Normal range of motion and neck supple.      Right lower leg: No edema.      Left lower leg: No edema.   Lymphadenopathy:      Cervical: No cervical adenopathy.   Skin:     General: Skin is warm and dry.      Coloration: Skin is not jaundiced.      Findings: No lesion.   Neurological:      General: No focal deficit present.      Mental Status: He is alert and oriented to person, place, and time.      Motor: No weakness.   Psychiatric:         Mood and Affect: Mood normal.         Behavior: Behavior normal.         Thought Content: Thought content normal.         Labs:   Recent Results (from the past 48 hour(s))   CBC Oncology    Collection Time: 04/14/23  7:46 AM   Result Value Ref Range    WBC 10.35 3.90 - 12.70 K/uL    RBC 3.68 (L) 4.60 - 6.20 M/uL    " Hemoglobin 10.6 (L) 14.0 - 18.0 g/dL    Hematocrit 35.5 (L) 40.0 - 54.0 %    MCV 97 82 - 98 fL    MCH 28.8 27.0 - 31.0 pg    MCHC 29.9 (L) 32.0 - 36.0 g/dL    RDW 13.3 11.5 - 14.5 %    Platelets 324 150 - 450 K/uL    MPV 9.8 9.2 - 12.9 fL    Gran # (ANC) 6.9 1.8 - 7.7 K/uL    Immature Grans (Abs) 0.04 0.00 - 0.04 K/uL   Comprehensive Metabolic Panel    Collection Time: 04/14/23  7:46 AM   Result Value Ref Range    Sodium 138 136 - 145 mmol/L    Potassium 5.4 (H) 3.5 - 5.1 mmol/L    Chloride 101 95 - 110 mmol/L    CO2 25 23 - 29 mmol/L    Glucose 273 (H) 70 - 110 mg/dL    BUN 26 (H) 6 - 20 mg/dL    Creatinine 1.2 0.5 - 1.4 mg/dL    Calcium 9.6 8.7 - 10.5 mg/dL    Total Protein 7.2 6.0 - 8.4 g/dL    Albumin 3.6 3.5 - 5.2 g/dL    Total Bilirubin 0.7 0.1 - 1.0 mg/dL    Alkaline Phosphatase 97 55 - 135 U/L    AST 16 10 - 40 U/L    ALT 19 10 - 44 U/L    Anion Gap 12 8 - 16 mmol/L    eGFR >60.0 >60 mL/min/1.73 m^2   Prostate Specific Antigen, Diagnostic    Collection Time: 04/14/23  7:46 AM   Result Value Ref Range    PSA Diagnostic <0.01 0.00 - 4.00 ng/mL            Imaging:     DXA Bone Density with Vertebral Fracture  Narrative: EXAMINATION:  DEXA BONE DENSITY WITH VERTEBRAL FRACTURE ASSESSMENT    CLINICAL HISTORY:  Malignant neoplasm of prostate.  56 y/o male with no history of fractures.  He is taking Calcium, Vit D and Multivitamin.  He has a history of Diabetes.  Radiation Treatment and Prostate Ca.  He does not exercise.  Pt smokes .    TECHNIQUE:  DXA specification: Main Page [id number] ([id number])    Bone Mineral Density scanning was performed over the hip and lumbar spine.    Review of the images confirms satisfactory positioning and technique.    COMPARISON:  No Comparisons    FINDINGS:  Lumbar spine (L1-L4):               BMD is 0.949 g/cm2, T-score is -1.3, and Z-score is -1.6.    Total hip:                                BMD is 0.898 g/cm2, T-score is -0.9, and Z-score is -0.9.    Femoral neck:                           BMD is 0.798 g/cm2, T-score is -1.0, and Z-score is -0.6.    Distal 1/3 radius:                      Not applicable    FRAX:    1.8% risk of a major osteoporotic fracture in the next 10 years.    0.2% risk of hip fracture in the next 10 years.  Impression: *Low bone mass (Osteopenia); FRAX calculations do not support treatment as osteoporosis.    RECOMMENDATIONS:  *Daily calcium intake 2671-2384 mg, dietary sources preferred; Vitamin D 6398-9537 IU daily.  *Weight bearing exercise and fall precautions.  *If dedicated imaging is negative for vertebral fracture, then no additional treatment is recommended at this time. If positive for fracture, would treat as osteoporosis.  *If patient smokes would recommend cessation.  *Repeat BMD in 2 years.    EXPLANATION OF RESULTS:  T-score compares these results to the average bone density of a 20-29 year-old of the same gender.    Z-score compares this result to the average bone density to people of the same age, gender, and race.    The amounts indicate the number of standard deviations above or below the mean.    * Osteoporosis is generally defined as having a T-score between less than or equal to -2.5.    * Low bone mass (osteopenia) is generally defined as having a T-score between -1.0 and -2.5.    * The normal range is generally defined as having a T-score greater than or equal to -1.0.    * Calculated FRAX scores for fracture risk prediction may not be accurate in the setting of certain clinical factors such as pharmacologic therapy for osteoporosis, prior fragility fractures, high dose glucocorticoid use.    Electronically signed by: Tayla Amato MD  Date:    03/27/2023  Time:    07:46            Diagnoses:       1. Prostate cancer metastatic to intrapelvic lymph node    2. Hypertension, unspecified type    3. Type 2 diabetes mellitus with diabetic polyneuropathy, without long-term current use of insulin            Assessment and Plan:     1.  Prostate cancer metastatic to intrapelvic lymph node  Overview:  Intermediate risk [place] 4+3 disease sp R1 prostatectomy 2015 with adjuvant RT presumably to prostate bed. Now with PSA recurrence and PSMA imaging with regional maren metastases and limited intraabdominal maren mets.Started intensified hormonal therapy with ADT + abiraterone 02/2023. Plan for further consideration of RT.    Assessment & Plan:  Tolerating treatment well with minimal side effecs. BP and sugars slightly elevated.    - ADT with Triptorelin 11.25mg 3/29/23; next 6/21/23  - Continue /5  - Safety labs in 4 weeks    - Con't OTC Ca/D  - DEXA 3/27/23 - Low risk osteopenia; repeat 2 years    - Needs to schedule genetics counseling; provided pt phone number to help schedule  - Send [facility] xF with next labs    - T/B with Rad Onc          2. Hypertension, unspecified type  Overview:  Home medications include carvedilol, amlodipine, and losartan.     Assessment & Plan:  - increase carvedilol to 25mg po bid  - Enroll in care companion    Orders:  -     carvediloL (COREG) 25 MG tablet; Take 1 tablet (25 mg total) by mouth 2 (two) times daily.  Dispense: 60 tablet; Refill: 11    3. Type 2 diabetes mellitus with diabetic polyneuropathy, without long-term current use of insulin  Overview:  Home medications include metformin. No insulin use. Complicated by peripheral neuropathy and potentially gastroparesis.    Assessment & Plan:  - Followign with his PCP, who recently increased his metformin to 1000 po bid  - Has PCP FU in May               Route Chart for Scheduling    Med Onc Chart Routing      Follow up with physician Whit Zuleta 8 weeks   Follow up with DONAVAN 4 weeks. [place]   Infusion scheduling note    Injection scheduling note    Labs CBC, CMP and PSA   Scheduling:  Preferred lab:  Lab interval:  a1c and [facility] xF NGS next blood draw   Imaging    Pharmacy appointment    Other referrals           Treatment Plan Information   OP ABIRATERONE  DAILY PREDNISONE BID   Edward Zuleta MD   Upcoming Treatment Dates - OP ABIRATERONE DAILY PREDNISONE BID    No upcoming days in selected categories.       Edward Zuleta MD  Hematology/Oncology  [facility]

## 2023-04-28 ENCOUNTER — HOSPITAL ENCOUNTER (EMERGENCY)
Facility: HOSPITAL | Age: 58
Discharge: HOME | End: 2023-04-28
Attending: EMERGENCY MEDICINE
Payer: MEDICAID

## 2023-04-28 VITALS
HEIGHT: 71 IN | WEIGHT: 220 LBS | DIASTOLIC BLOOD PRESSURE: 99 MMHG | RESPIRATION RATE: 19 BRPM | SYSTOLIC BLOOD PRESSURE: 149 MMHG | BODY MASS INDEX: 30.8 KG/M2 | HEART RATE: 89 BPM | OXYGEN SATURATION: 99 % | TEMPERATURE: 98 F

## 2023-04-28 DIAGNOSIS — Z91.89 AT RISK FOR PROLONGED QT INTERVAL SYNDROME: ICD-10-CM

## 2023-04-28 DIAGNOSIS — F12.90 CANNABIS USE DISORDER: ICD-10-CM

## 2023-04-28 DIAGNOSIS — R10.9 ABDOMINAL PAIN, UNSPECIFIED ABDOMINAL LOCATION: ICD-10-CM

## 2023-04-28 DIAGNOSIS — R10.9 ABDOMINAL PAIN: ICD-10-CM

## 2023-04-28 DIAGNOSIS — R11.2 NAUSEA AND VOMITING, UNSPECIFIED VOMITING TYPE: Primary | ICD-10-CM

## 2023-04-28 LAB
ALBUMIN SERPL BCP-MCNC: 4.1 G/DL (ref 3.5–5.2)
ALLENS TEST: NORMAL
ALP SERPL-CCNC: 100 U/L (ref 55–135)
ALT SERPL W/O P-5'-P-CCNC: 21 U/L (ref 10–44)
ANION GAP SERPL CALC-SCNC: 13 MMOL/L (ref 8–16)
ANION GAP SERPL CALC-SCNC: 15 MMOL/L (ref 8–16)
AST SERPL-CCNC: 20 U/L (ref 10–40)
BASOPHILS # BLD AUTO: 0.03 K/UL (ref 0–0.2)
BASOPHILS NFR BLD: 0.4 % (ref 0–1.9)
BILIRUB SERPL-MCNC: 0.7 MG/DL (ref 0.1–1)
BILIRUB UR QL STRIP: NEGATIVE
BUN SERPL-MCNC: 26 MG/DL (ref 6–20)
BUN SERPL-MCNC: 30 MG/DL (ref 6–20)
CALCIUM SERPL-MCNC: 9.2 MG/DL (ref 8.7–10.5)
CALCIUM SERPL-MCNC: 9.9 MG/DL (ref 8.7–10.5)
CHLORIDE SERPL-SCNC: 102 MMOL/L (ref 95–110)
CHLORIDE SERPL-SCNC: 103 MMOL/L (ref 95–110)
CLARITY UR REFRACT.AUTO: CLEAR
CO2 SERPL-SCNC: 17 MMOL/L (ref 23–29)
CO2 SERPL-SCNC: 19 MMOL/L (ref 23–29)
COLOR UR AUTO: ABNORMAL
CREAT SERPL-MCNC: 1.4 MG/DL (ref 0.5–1.4)
CREAT SERPL-MCNC: 1.7 MG/DL (ref 0.5–1.4)
DIFFERENTIAL METHOD: ABNORMAL
EOSINOPHIL # BLD AUTO: 0 K/UL (ref 0–0.5)
EOSINOPHIL NFR BLD: 0.5 % (ref 0–8)
ERYTHROCYTE [DISTWIDTH] IN BLOOD BY AUTOMATED COUNT: 13.6 % (ref 11.5–14.5)
EST. GFR  (NO RACE VARIABLE): 46.2 ML/MIN/1.73 M^2
EST. GFR  (NO RACE VARIABLE): 58.3 ML/MIN/1.73 M^2
GLUCOSE SERPL-MCNC: 138 MG/DL (ref 70–110)
GLUCOSE SERPL-MCNC: 149 MG/DL (ref 70–110)
GLUCOSE UR QL STRIP: NEGATIVE
HCT VFR BLD AUTO: 34.8 % (ref 40–54)
HGB BLD-MCNC: 10.7 G/DL (ref 14–18)
HGB UR QL STRIP: ABNORMAL
HYALINE CASTS UR QL AUTO: 1 /LPF
IMM GRANULOCYTES # BLD AUTO: 0.03 K/UL (ref 0–0.04)
IMM GRANULOCYTES NFR BLD AUTO: 0.4 % (ref 0–0.5)
KETONES UR QL STRIP: NEGATIVE
LDH SERPL L TO P-CCNC: 1.57 MMOL/L (ref 0.5–2.2)
LEUKOCYTE ESTERASE UR QL STRIP: NEGATIVE
LIPASE SERPL-CCNC: 31 U/L (ref 4–60)
LYMPHOCYTES # BLD AUTO: 1.5 K/UL (ref 1–4.8)
LYMPHOCYTES NFR BLD: 17.6 % (ref 18–48)
MCH RBC QN AUTO: 29.1 PG (ref 27–31)
MCHC RBC AUTO-ENTMCNC: 30.7 G/DL (ref 32–36)
MCV RBC AUTO: 95 FL (ref 82–98)
MICROSCOPIC COMMENT: NORMAL
MONOCYTES # BLD AUTO: 0.3 K/UL (ref 0.3–1)
MONOCYTES NFR BLD: 3.9 % (ref 4–15)
NEUTROPHILS # BLD AUTO: 6.4 K/UL (ref 1.8–7.7)
NEUTROPHILS NFR BLD: 77.2 % (ref 38–73)
NITRITE UR QL STRIP: NEGATIVE
NRBC BLD-RTO: 0 /100 WBC
PH UR STRIP: 5 [PH] (ref 5–8)
PLATELET # BLD AUTO: 226 K/UL (ref 150–450)
PMV BLD AUTO: 9.5 FL (ref 9.2–12.9)
POTASSIUM SERPL-SCNC: 4.2 MMOL/L (ref 3.5–5.1)
POTASSIUM SERPL-SCNC: 4.6 MMOL/L (ref 3.5–5.1)
PROT SERPL-MCNC: 7.9 G/DL (ref 6–8.4)
PROT UR QL STRIP: NEGATIVE
RBC # BLD AUTO: 3.68 M/UL (ref 4.6–6.2)
RBC #/AREA URNS AUTO: 2 /HPF (ref 0–4)
SAMPLE: NORMAL
SITE: NORMAL
SODIUM SERPL-SCNC: 134 MMOL/L (ref 136–145)
SODIUM SERPL-SCNC: 135 MMOL/L (ref 136–145)
SP GR UR STRIP: 1.01 (ref 1–1.03)
URN SPEC COLLECT METH UR: ABNORMAL
WBC # BLD AUTO: 8.24 K/UL (ref 3.9–12.7)
WBC #/AREA URNS AUTO: 0 /HPF (ref 0–5)

## 2023-04-28 PROCEDURE — 83605 ASSAY OF LACTIC ACID: CPT

## 2023-04-28 PROCEDURE — 99284 EMERGENCY DEPT VISIT MOD MDM: CPT | Mod: 25

## 2023-04-28 PROCEDURE — 93010 ELECTROCARDIOGRAM REPORT: CPT | Mod: ,,, | Performed by: INTERNAL MEDICINE

## 2023-04-28 PROCEDURE — 99284 EMERGENCY DEPT VISIT MOD MDM: CPT | Mod: ,,, | Performed by: EMERGENCY MEDICINE

## 2023-04-28 PROCEDURE — 85025 COMPLETE CBC W/AUTO DIFF WBC: CPT | Performed by: STUDENT IN AN ORGANIZED HEALTH CARE EDUCATION/TRAINING PROGRAM

## 2023-04-28 PROCEDURE — 96374 THER/PROPH/DIAG INJ IV PUSH: CPT

## 2023-04-28 PROCEDURE — 81001 URINALYSIS AUTO W/SCOPE: CPT | Performed by: STUDENT IN AN ORGANIZED HEALTH CARE EDUCATION/TRAINING PROGRAM

## 2023-04-28 PROCEDURE — 93010 EKG 12-LEAD: ICD-10-PCS | Mod: ,,, | Performed by: INTERNAL MEDICINE

## 2023-04-28 PROCEDURE — 93005 ELECTROCARDIOGRAM TRACING: CPT

## 2023-04-28 PROCEDURE — 80053 COMPREHEN METABOLIC PANEL: CPT | Performed by: STUDENT IN AN ORGANIZED HEALTH CARE EDUCATION/TRAINING PROGRAM

## 2023-04-28 PROCEDURE — 96361 HYDRATE IV INFUSION ADD-ON: CPT

## 2023-04-28 PROCEDURE — 99900035 HC TECH TIME PER 15 MIN (STAT)

## 2023-04-28 PROCEDURE — 83690 ASSAY OF LIPASE: CPT | Performed by: STUDENT IN AN ORGANIZED HEALTH CARE EDUCATION/TRAINING PROGRAM

## 2023-04-28 PROCEDURE — 80048 BASIC METABOLIC PNL TOTAL CA: CPT | Mod: XB | Performed by: STUDENT IN AN ORGANIZED HEALTH CARE EDUCATION/TRAINING PROGRAM

## 2023-04-28 PROCEDURE — 63600175 PHARM REV CODE 636 W HCPCS: Performed by: STUDENT IN AN ORGANIZED HEALTH CARE EDUCATION/TRAINING PROGRAM

## 2023-04-28 PROCEDURE — 99284 PR EMERGENCY DEPT VISIT,LEVEL IV: ICD-10-PCS | Mod: ,,, | Performed by: EMERGENCY MEDICINE

## 2023-04-28 RX ORDER — DROPERIDOL 2.5 MG/ML
1.25 INJECTION, SOLUTION INTRAMUSCULAR; INTRAVENOUS
Status: COMPLETED | OUTPATIENT
Start: 2023-04-28 | End: 2023-04-28

## 2023-04-28 RX ADMIN — DROPERIDOL 1.25 MG: 2.5 INJECTION, SOLUTION INTRAMUSCULAR; INTRAVENOUS at 04:04

## 2023-04-28 RX ADMIN — SODIUM CHLORIDE, POTASSIUM CHLORIDE, SODIUM LACTATE AND CALCIUM CHLORIDE 1000 ML: 600; 310; 30; 20 INJECTION, SOLUTION INTRAVENOUS at 04:04

## 2023-04-28 NOTE — ED PROVIDER NOTES
Encounter Date: 4/28/2023       History     Chief Complaint   Patient presents with    Nausea     Arrived via ems from  with c/o N/V and abdominal pain not relieved with zofran     57-year-old male with PMH of prostate cancer, diabetes, and cannabis use presents with abdominal pain.  Patient states the abdominal pain has been present since yesterday and fluctuates in intensity, it is currently a 7/10 and located diffusely.  He is unsure how many times he is vomited update has been nonbloody.  He reports chills but denies fever, chest pain, shortness of breath, dysuria, hematuria, diarrhea, and constipation.  Reports regular marijuana use.  He went to an urgent care initially who referred him to the ED to rule out pancreatitis.  He was given Zofran that did not relieve his symptoms.     The history is provided by the patient and medical records. History limited by: decreased patient cooperation.   Review of patient's allergies indicates:   Allergen Reactions    Lisinopril Shortness Of Breath     Past Medical History:   Diagnosis Date    Diabetes mellitus     GERD (gastroesophageal reflux disease)     Hypertension     Prostate cancer      Past Surgical History:   Procedure Laterality Date    ESOPHAGOGASTRODUODENOSCOPY N/A 12/5/2022    Procedure: EGD (ESOPHAGOGASTRODUODENOSCOPY);  Surgeon: Beronica Valera MD;  Location: [facility];  Service: Endoscopy;  Laterality: N/A;    PROSTATECTOMY       Family History   Problem Relation Age of Onset    Prostate cancer Father     Pancreatic cancer Neg Hx     Breast cancer Neg Hx      Social History     Tobacco Use    Smoking status: Every Day     Types: Cigars    Smokeless tobacco: Never    Tobacco comments:     2-3 cigars/day   Substance Use Topics    Alcohol use: Yes     Comment: Occaisionally throughout the week    Drug use: Yes     Types: Marijuana     Comment: Remote crack cocaine use. No IVDU. Daily marijuana use.     Review of Systems    Physical Exam      Initial Vitals [04/28/23 1423]   BP Pulse Resp Temp SpO2   (!) 156/72 72 18 98.2 °F (36.8 °C) 99 %      MAP       --         Physical Exam    Nursing note and vitals reviewed.  Constitutional: He appears well-developed and well-nourished. He is not diaphoretic. No distress.   Pt lying down with blanket over his head, required MD to uncover his head   HENT:   Head: Normocephalic and atraumatic.   Eyes: Conjunctivae and EOM are normal. Pupils are equal, round, and reactive to light.   Neck: Neck supple.   Normal range of motion.  Cardiovascular:  Normal rate, regular rhythm, normal heart sounds and intact distal pulses.           No murmur heard.  Pulmonary/Chest: Breath sounds normal. No respiratory distress. He has no wheezes. He has no rhonchi. He has no rales.   No increased work of breathing or tachypnea   Abdominal: Abdomen is soft. He exhibits no distension. There is no abdominal tenderness.   Soft, nondistended abdomen with no tenderness to light or deep palpation diffusely There is no rebound and no guarding.   Musculoskeletal:         General: No tenderness or edema.      Cervical back: Normal range of motion and neck supple.     Neurological: He is alert and oriented to person, place, and time.   Skin: Skin is warm and dry. Capillary refill takes less than 2 seconds.       ED Course   Procedures  Labs Reviewed   CBC W/ AUTO DIFFERENTIAL - Abnormal; Notable for the following components:       Result Value    RBC 3.68 (*)     Hemoglobin 10.7 (*)     Hematocrit 34.8 (*)     MCHC 30.7 (*)     Gran % 77.2 (*)     Lymph % 17.6 (*)     Mono % 3.9 (*)     All other components within normal limits   COMPREHENSIVE METABOLIC PANEL - Abnormal; Notable for the following components:    Sodium 134 (*)     CO2 17 (*)     Glucose 149 (*)     BUN 30 (*)     Creatinine 1.7 (*)     eGFR 46.2 (*)     All other components within normal limits   URINALYSIS, REFLEX TO URINE CULTURE - Abnormal; Notable for the following  components:    Occult Blood UA 1+ (*)     All other components within normal limits    Narrative:     Specimen Source->Urine   BASIC METABOLIC PANEL - Abnormal; Notable for the following components:    Sodium 135 (*)     CO2 19 (*)     Glucose 138 (*)     BUN 26 (*)     eGFR 58.3 (*)     All other components within normal limits   LIPASE   URINALYSIS MICROSCOPIC    Narrative:     Specimen Source->Urine   ISTAT LACTATE     EKG Readings: (Independently Interpreted)   Sinus rhythm with marked sinus arrhythmia at a rate around 67, no STEMI,          Imaging Results    None          Medications   droPERidol injection 1.25 mg (1.25 mg Intravenous Given 4/28/23 1643)   lactated ringers bolus 1,000 mL (0 mLs Intravenous Stopped 4/28/23 1742)     Medical Decision Making:   History:   Old Medical Records: I decided to obtain old medical records.  Old Records Summarized: records from previous admission(s).       <> Summary of Records: Patient has documented history of cannabis hyperemesis  Initial Assessment:   Afebrile, hemodynamically stable 57-year-old male presents with 2 day history of intermittent abdominal pain, nausea and vomiting unresponsive to Zofran  Differential Diagnosis:   Cannabis hyperemesis, cyclical vomiting, pancreatitis, gastroparesis, doubt SBO/LBO, cholecystitis  Clinical Tests:   Lab Tests: Ordered and Reviewed  Medical Tests: Ordered and Reviewed  ED Management:  Initial management with droperidol and 1 L LR.  See ED course for additional information.          Attending Attestation:   Physician Attestation Statement for Resident:  As the supervising MD   Physician Attestation Statement: I have personally seen and examined this patient.   I agree with the above history.  - with the following exceptions: This is a 57-year-old male who has a history of multiple medical comorbidities including hypertension, diabetes, gastroesophageal reflux disease, gastroparesis, cannabis use, and diabetic  neuropathy as well as prostate cancer.  He is managed on chronic prednisone and abiraterone.  Presents to the emergency department today as he has had a now 2 day history of nausea, nonbloody nonbilious emesis, and diffuse abdominal cramping type pain.  It does wax and wane.  He has had no associated diarrhea.  He is had no chest pain or shortness of breath.  He is had no fever.  He is had no urinary symptoms. Vomiting is his predominant symptom and concern.    As the supervising MD I agree with the above PE.   - with the following exceptions: VS upon arrival:  138/94; 98; 20; 98% room air; afebrile.  Consititutional: Pt is awake, alert, and oriented x 4. Appears uncomfortable. Lying under a sheet when I arrived (with a sheet over his head).  HEENT: PERRL; EOMI; nares patent; op clear; mm dry without lesions.  Neck: Supple with good ROM.  CV: Normal rate; regular rhythm; no mrg. Heart sounds normal. No peripheral edema. 2+ radials bilateral and symmetric.  Respiratory: CTA bilaterally with no focal rales, ronchi, or wheezes.  GI: Abdomen soft, NTND. No rebound. No guarding. BS normal. Actually had a benign abdominal exam.   MSK: No long bone deformities; no focal joint swellings.  Neuro: MAEW.   Skin: No skin lesions or rash.       As the supervising MD I agree with the above treatment, course, plan, and disposition.   - with the following exceptions: The patient has multiple medical comorbidities that could be attributing to his vomiting including diabetes/gastroparesis and cannabis use. However, we also needed to assure that there was no other ongoing etiology -   The patient had isolated vomiting. No CP or SOB. No other anginal equivalent symptoms. Regardless, ECG obtained, independently reviewed and interpreted by me, reveals sinus arrhythmia with nonspecific ST and T-wave changes that are not concerning for acute ischemia or infarction. This was reassuring in the setting of this low pretest probability of  cardiac ischemia.  The patient is on abriaterone which does have side effects in some of hepatotoxicity. Thus I/we ordered hepatic panel. I have reviewed this and it is normal - also reassuring.   At this point the patient has had multiple serial benign abdominal exams. I do not feel that further abdominal imaging is necessary given his reassuring examination.     The patient has had significant vomiting and appeared dehydrated on examination. I considered admission given his initial presentation, particularly if we were unable to get his vomiting under control. We also began with a fluid bolus given the dehydration and antiemetics with droperidol. I/we checked electrolytes given the amount of vomiting he has had as well as CBC/urinalysis to assure there was no infection present driving these symptoms. I have reviewed his labs. His WBC is normal at 8.24. His initial creatnine was elevated at 1.7 with a bicarb of 17, revealing a metabolic acidosis that was likely due to lactic acidosis/dehydration. His urine was without infection.     Following fluids, I/we repeated his labs. We see improvement in his HARRY with a serial creatnine of 1.4. His bicarb also has improved to 19. He feels better. No longer vomiting. Trialing PO and will likely dispo home.     He will need close follow up within the next 72 hours for recheck and has already been given strict return precautions by me including to return for recurrence of intractable vomiting, abdominal pain, chest pain, SOB, fever, or any other concerns. He is in stable condition at this time.     ED Diagnosis:  1. Acute vomiting.   2. Acute dehydration.   3. Acute kidney injury, resolved.   4. Acute metabolic acidosis, resolving.   5. Above diagnoses complicated by known diabetes/gastroparesis and cannabis use.    I have reviewed and agree with the residents interpretation of the following: lab data and EKG.  I have reviewed the following: old records at this facility.               ED Course as of 04/28/23 2236 Fri Apr 28, 2023 1718 Comp. Metabolic Panel(!)  CMP remarkable for mild HARRY with creatinine 1.7, up from 1.4.  Bicarb slightly low at 17. Electrolytes unremarkable [BD]   1718 CBC W/ AUTO DIFFERENTIAL(!)  CBC unremarkable without leukocytosis, significant anemia, or decreased platelets   [BD]   1718 Lipase: 31  Pancreatitis unlikely [BD]   1836 Urinalysis, Reflex to Urine Culture Urine, Clean Catch(!)  UA unremarkable without evidence of infection or hematuria   [BD]   1836 POC Lactate: 1.57  normal [BD]   1843 Patient feeling much better at this time and denies any abdominal pain or nausea.  We will recheck a creatinine to demonstrate improvement and then the patient can be discharged upon ambulation trial.  I suspect his symptoms are secondary to either gastroparesis or cannabis hyperemesis.  Patient has been educated on the importance of marijuana cessation [BD]   1941 Basic metabolic panel(!)  BNP shows improving creatinine.  Patient is asymptomatic and will be discharged at this time.  He has been encouraged to increase his p.o. intake.  He has been given home care instructions, follow-up instructions, and strict return precautions.  Patient and his fiancee agree with and are comfortable with the plan. [BD]      ED Course User Index  [BD] Unruly Brumfield MD                   Clinical Impression:   Final diagnoses:  [R10.9] Abdominal pain  [Z91.89] At risk for prolonged QT interval syndrome  [R11.2] Nausea and vomiting, unspecified vomiting type (Primary)  [R10.9] Abdominal pain, unspecified abdominal location  [F12.90] Cannabis use disorder        ED Disposition Condition    Discharge Stable          ED Prescriptions    None       Follow-up Information       Follow up With Specialties Details Why Contact Info    Kosta Roberts MD Family Medicine Schedule an appointment as soon as possible for a visit  To discuss your recent ER visit and any additional concerns  that you may have [address]  [address] [facility] [place]  [facility] [address]  [phone]      [facility] - Emergency Dept Emergency Medicine Go to  As needed, If symptoms worsen [id number]  [facility] [address] [phone]             Unruly Brumfield MD  Resident  04/28/23 2232       Bee Chappell MD  05/02/23 1132

## 2023-04-28 NOTE — PROVIDER PROGRESS NOTES - EMERGENCY DEPT.
Encounter Date: 4/28/2023    ED Physician Progress Notes         EKG - STEMI Decision  Initial Reading: No STEMI present.

## 2023-04-29 NOTE — DISCHARGE INSTRUCTIONS
It was a pleasure taking care of you today!     Home Care:   - continue home medications as prescribed  - drink lots of water    Follow-Up Plan:  - Follow-up with primary care doctor within 3 - 5 days to discuss your recent ER visit and any additional concerns that you may have.  - Additional testing and/or evaluation as directed by your primary doctor    Return to the Emergency Department for symptoms including but not limited to: persistence or worsening of symptoms, shortness of breath or chest pain, inability to drink without vomiting, passing out/fainting/ loss of consciousness, or if you have other concerns.

## 2023-05-01 ENCOUNTER — TELEPHONE (OUTPATIENT)
Dept: HEMATOLOGY/ONCOLOGY | Facility: CLINIC | Age: 58
End: 2023-05-01
Payer: MEDICAID

## 2023-05-01 ENCOUNTER — OFFICE VISIT (OUTPATIENT)
Dept: HEMATOLOGY/ONCOLOGY | Facility: CLINIC | Age: 58
End: 2023-05-01
Payer: MEDICAID

## 2023-05-01 ENCOUNTER — LAB VISIT (OUTPATIENT)
Dept: LAB | Facility: HOSPITAL | Age: 58
End: 2023-05-01
Attending: HOSPITALIST
Payer: MEDICAID

## 2023-05-01 VITALS
WEIGHT: 206.56 LBS | DIASTOLIC BLOOD PRESSURE: 102 MMHG | OXYGEN SATURATION: 99 % | HEART RATE: 99 BPM | HEIGHT: 71 IN | RESPIRATION RATE: 18 BRPM | BODY MASS INDEX: 28.92 KG/M2 | SYSTOLIC BLOOD PRESSURE: 164 MMHG

## 2023-05-01 DIAGNOSIS — E11.42 TYPE 2 DIABETES MELLITUS WITH DIABETIC POLYNEUROPATHY, WITHOUT LONG-TERM CURRENT USE OF INSULIN: ICD-10-CM

## 2023-05-01 DIAGNOSIS — C61 PROSTATE CANCER METASTATIC TO INTRAPELVIC LYMPH NODE: ICD-10-CM

## 2023-05-01 DIAGNOSIS — K31.84 GASTROPARESIS DUE TO DM: Primary | ICD-10-CM

## 2023-05-01 DIAGNOSIS — K21.9 GASTROESOPHAGEAL REFLUX DISEASE, UNSPECIFIED WHETHER ESOPHAGITIS PRESENT: ICD-10-CM

## 2023-05-01 DIAGNOSIS — E11.43 GASTROPARESIS DUE TO DM: Primary | ICD-10-CM

## 2023-05-01 DIAGNOSIS — B37.0 THRUSH: ICD-10-CM

## 2023-05-01 DIAGNOSIS — C77.5 PROSTATE CANCER METASTATIC TO INTRAPELVIC LYMPH NODE: ICD-10-CM

## 2023-05-01 DIAGNOSIS — F12.10 CANNABIS ABUSE: ICD-10-CM

## 2023-05-01 DIAGNOSIS — C61 PROSTATE CANCER: ICD-10-CM

## 2023-05-01 DIAGNOSIS — I10 HYPERTENSION, UNSPECIFIED TYPE: ICD-10-CM

## 2023-05-01 PROBLEM — R97.20 ELEVATED PSA, LESS THAN 10 NG/ML: Status: RESOLVED | Noted: 2023-03-29 | Resolved: 2023-05-01

## 2023-05-01 LAB
ALBUMIN SERPL BCP-MCNC: 4 G/DL (ref 3.5–5.2)
ALP SERPL-CCNC: 104 U/L (ref 55–135)
ALT SERPL W/O P-5'-P-CCNC: 26 U/L (ref 10–44)
ANION GAP SERPL CALC-SCNC: 10 MMOL/L (ref 8–16)
AST SERPL-CCNC: 25 U/L (ref 10–40)
BILIRUB SERPL-MCNC: 1.1 MG/DL (ref 0.1–1)
BUN SERPL-MCNC: 17 MG/DL (ref 6–20)
CALCIUM SERPL-MCNC: 9.7 MG/DL (ref 8.7–10.5)
CHLORIDE SERPL-SCNC: 96 MMOL/L (ref 95–110)
CO2 SERPL-SCNC: 26 MMOL/L (ref 23–29)
COMPLEXED PSA SERPL-MCNC: <0.01 NG/ML (ref 0–4)
CREAT SERPL-MCNC: 1.1 MG/DL (ref 0.5–1.4)
ERYTHROCYTE [DISTWIDTH] IN BLOOD BY AUTOMATED COUNT: 13.3 % (ref 11.5–14.5)
EST. GFR  (NO RACE VARIABLE): >60 ML/MIN/1.73 M^2
GLUCOSE SERPL-MCNC: 202 MG/DL (ref 70–110)
HCT VFR BLD AUTO: 40.6 % (ref 40–54)
HGB BLD-MCNC: 13 G/DL (ref 14–18)
IMM GRANULOCYTES # BLD AUTO: 0.02 K/UL (ref 0–0.04)
MCH RBC QN AUTO: 28.6 PG (ref 27–31)
MCHC RBC AUTO-ENTMCNC: 32 G/DL (ref 32–36)
MCV RBC AUTO: 89 FL (ref 82–98)
NEUTROPHILS # BLD AUTO: 5.1 K/UL (ref 1.8–7.7)
PLATELET # BLD AUTO: 313 K/UL (ref 150–450)
PMV BLD AUTO: 9.6 FL (ref 9.2–12.9)
POTASSIUM SERPL-SCNC: 4 MMOL/L (ref 3.5–5.1)
PROT SERPL-MCNC: 7.8 G/DL (ref 6–8.4)
RBC # BLD AUTO: 4.55 M/UL (ref 4.6–6.2)
SODIUM SERPL-SCNC: 132 MMOL/L (ref 136–145)
WBC # BLD AUTO: 8.04 K/UL (ref 3.9–12.7)

## 2023-05-01 PROCEDURE — 3008F PR BODY MASS INDEX (BMI) DOCUMENTED: ICD-10-PCS | Mod: CPTII,,, | Performed by: HOSPITALIST

## 2023-05-01 PROCEDURE — 99214 OFFICE O/P EST MOD 30 MIN: CPT | Mod: PBBFAC | Performed by: HOSPITALIST

## 2023-05-01 PROCEDURE — 4010F ACE/ARB THERAPY RXD/TAKEN: CPT | Mod: CPTII,,, | Performed by: HOSPITALIST

## 2023-05-01 PROCEDURE — 36415 COLL VENOUS BLD VENIPUNCTURE: CPT | Performed by: HOSPITALIST

## 2023-05-01 PROCEDURE — 3080F DIAST BP >= 90 MM HG: CPT | Mod: CPTII,,, | Performed by: HOSPITALIST

## 2023-05-01 PROCEDURE — 84153 ASSAY OF PSA TOTAL: CPT | Performed by: HOSPITALIST

## 2023-05-01 PROCEDURE — 85027 COMPLETE CBC AUTOMATED: CPT | Performed by: HOSPITALIST

## 2023-05-01 PROCEDURE — 3077F SYST BP >= 140 MM HG: CPT | Mod: CPTII,,, | Performed by: HOSPITALIST

## 2023-05-01 PROCEDURE — 99215 PR OFFICE/OUTPT VISIT, EST, LEVL V, 40-54 MIN: ICD-10-PCS | Mod: S$PBB,,, | Performed by: HOSPITALIST

## 2023-05-01 PROCEDURE — 99215 OFFICE O/P EST HI 40 MIN: CPT | Mod: S$PBB,,, | Performed by: HOSPITALIST

## 2023-05-01 PROCEDURE — 99999 PR PBB SHADOW E&M-EST. PATIENT-LVL IV: CPT | Mod: PBBFAC,,, | Performed by: HOSPITALIST

## 2023-05-01 PROCEDURE — 3008F BODY MASS INDEX DOCD: CPT | Mod: CPTII,,, | Performed by: HOSPITALIST

## 2023-05-01 PROCEDURE — 99999 PR PBB SHADOW E&M-EST. PATIENT-LVL IV: ICD-10-PCS | Mod: PBBFAC,,, | Performed by: HOSPITALIST

## 2023-05-01 PROCEDURE — 80053 COMPREHEN METABOLIC PANEL: CPT | Performed by: HOSPITALIST

## 2023-05-01 PROCEDURE — 4010F PR ACE/ARB THEARPY RXD/TAKEN: ICD-10-PCS | Mod: CPTII,,, | Performed by: HOSPITALIST

## 2023-05-01 PROCEDURE — 3077F PR MOST RECENT SYSTOLIC BLOOD PRESSURE >= 140 MM HG: ICD-10-PCS | Mod: CPTII,,, | Performed by: HOSPITALIST

## 2023-05-01 PROCEDURE — 3080F PR MOST RECENT DIASTOLIC BLOOD PRESSURE >= 90 MM HG: ICD-10-PCS | Mod: CPTII,,, | Performed by: HOSPITALIST

## 2023-05-01 RX ORDER — POLYETHYLENE GLYCOL 3350 17 G/17G
17 POWDER, FOR SOLUTION ORAL DAILY
Qty: 30 EACH | Refills: 0 | Status: SHIPPED | OUTPATIENT
Start: 2023-05-01 | End: 2024-01-29

## 2023-05-01 RX ORDER — METOCLOPRAMIDE 10 MG/1
10 TABLET ORAL
Qty: 56 TABLET | Refills: 0 | Status: SHIPPED | OUTPATIENT
Start: 2023-05-01 | End: 2023-05-15

## 2023-05-01 RX ORDER — NYSTATIN 100000 [USP'U]/ML
6 SUSPENSION ORAL 4 TIMES DAILY
Qty: 336 ML | Refills: 0 | Status: SHIPPED | OUTPATIENT
Start: 2023-05-01 | End: 2023-06-09 | Stop reason: ALTCHOICE

## 2023-05-01 RX ORDER — FLUCONAZOLE 200 MG/1
200 TABLET ORAL DAILY
Qty: 7 TABLET | Refills: 0 | Status: SHIPPED | OUTPATIENT
Start: 2023-05-01 | End: 2023-05-08

## 2023-05-01 NOTE — PROGRESS NOTES
MEDICAL ONCOLOGY FOLLOW-UP VISIT.     Best Contact Phone Number(s): [phone] (home)      Cancer/Stage/TNM:    Cancer Staging   Prostate cancer metastatic to intrapelvic lymph node  Staging form: Prostate, AJCC 8th Edition  - Clinical: Stage IVB (cTX, cN1, cM1a, PSA: 2.8, Grade Group: 3) - Signed by Edward Zuleta MD on 2/1/2023       Reason for visit: Mauro Benitez is a 57 y.o. male history of prostate cancer sp RALP with positive margins 11/2/2015. He was subsequently found to have BCR with PSA 2.8 06/2022 with maren relapse on PSMA imaging and subsequently started hormonal therapy 10/2022.  Medical course notable for recurrent nausea and vomiting thought due to gastroparesis. He presents to medical oncology clinic for urgent visit for recurrent abdominal pain, nausea, vomting, and chest pain after being seen twice in the ED and once in urgent care over the last several days. He is on intensified ADT with abiraterone 250/5 since 02/2023 with plan for additional consideration of  RT therapy.      Interval History:     Had a refrigerator fall on his leg on 4/25/23 prompting first ED trip. Noted to have bruising but no fracture. Subsequently on 4/28/23 developed a hot flash and drenching sweat which precipitated severe nausea and cyclical emesis. Presented to urgent care and was transferred to the ED. Given IVF and released home. Represented to the ED on 4/28 given ongoing and persistent nausea during the day Saturday. CT scan was normal and again given antiemetics and repletion.    Last emesis on Saturday night. Continues to feel generalized malaise and abdominal discomfort. No known fevers. No CP, SOB or cough. His blood pressures are high in the setting of not taking his BP meds recently. He admits to sigfnicant distress due to his ongoing marijuana use, which he thinks may be contributing to his symptoms.        Oncology History   Prostate cancer metastatic to intrapelvic lymph node   10/12/2015 Imaging  Significant Findings    CT a/p: No evidence of distant or maren metastatic disease    Bone scan: No evidence of metastatic disease. 1cm rounded area of uptake along left superomedial orbit/sphenoid thought possibly calcified menigioma.     11/2/2015 Surgery    Radical prostatectomy at [facility] in [place]. Pathology with prostate adenocarcinoma up to [place] 4+3 with associated PNI. Positive margin. 0/4 LN involved. Staged pT3aN0     2/23/2016 Imaging Significant Findings    Nuclear bone scan: No evidence of metastatic disease. Note made of uptake in left supraorbital region thought c/w left frontal sinusitis along with uptake in mandible and maxilla c/w periosteal disease.     4/27/2021 Imaging Significant Findings    Bone scan:  There is abnormal increased radiotracer activity identified involving the maxilla and multiple paranasal sinuses likely related to periodontal disease and sinus disease. Recommend clinical correlation and further evaluation with maxillofacial CT as warranted.     6/6/2022 Tumor Markers    PSA 2.8     6/21/2022 Imaging Significant Findings    PSMA PET-CT: In this patient with prostate malignancy status post prostatectomy, there are tracer-avid left para-aortic, presacral/left common iliac, and left external iliac chain nodes as described above, concerning for recurrent metastatic disease.     9/16/2022 -  Hormone Therapy    Start bicalutamide 50mg po daily     10/3/2022 -  Hormone Therapy    Trelstar 11.25mg IM x1     12/19/2022 Tumor Markers    PSA 0.02     1/4/2023 -  Hormone Therapy    Trelstart 11.25mg IM x1 with Dr. Edmond     2/1/2023 -  Chemotherapy    Treatment Summary   Plan Name: OP ABIRATERONE DAILY PREDNISONE BID  Treatment Goal: Curative  Status: Active  Start Date: 2/1/2023  End Date: 2/1/2023  Provider: Edward Zuleta MD  Chemotherapy: abiraterone (ZYTIGA) 250 mg Tab, 250 mg (100 % of original dose 250 mg), Oral, Daily, 1 of 1 cycle, Start date:  "2/1/2023, End date: --  Dose modification: 250 mg (original dose 250 mg, Cycle 1)       3/27/2023 Imaging Significant Findings    DEXA - Low risk osteopenia; repeat 2 years     3/29/2023 -  Hormone Therapy    Triptorelin 11.25mg            Physical Exam:   BP (!) 164/102 (BP Location: Right arm, Patient Position: Sitting, BP Method: Medium (Automatic))   Pulse 99   Resp 18   Ht 5' 11" (1.803 m)   Wt 93.7 kg (206 lb 9.1 oz)   SpO2 99%   BMI 28.81 kg/m²      ECOG Performance Status: (foot note - ECOG PS provided by [employer]cology [employer]) 1 - Symptomatic but completely ambulatory    Physical Exam  Constitutional:       Appearance: Normal appearance. He is ill-appearing (appears uncomfortable and anxious).   HENT:      Head: Normocephalic.      Mouth/Throat:      Pharynx: Oropharyngeal exudate present.      Comments: Marked thrush over his tongue  Eyes:      Extraocular Movements: Extraocular movements intact.      Conjunctiva/sclera: Conjunctivae normal.      Pupils: Pupils are equal, round, and reactive to light.   Cardiovascular:      Rate and Rhythm: Normal rate and regular rhythm.   Pulmonary:      Effort: Pulmonary effort is normal.      Breath sounds: Normal breath sounds.   Abdominal:      General: Bowel sounds are normal. There is no distension.      Palpations: Abdomen is soft.   Musculoskeletal:         General: No swelling. Normal range of motion.      Cervical back: Normal range of motion and neck supple.      Right lower leg: No edema.      Left lower leg: No edema.   Lymphadenopathy:      Cervical: No cervical adenopathy.   Skin:     General: Skin is warm and dry.      Coloration: Skin is not jaundiced.      Findings: No lesion.   Neurological:      General: No focal deficit present.      Mental Status: He is alert and oriented to person, place, and time.      Motor: No weakness.   Psychiatric:         Behavior: Behavior normal.         Thought Content: Thought content normal.     "     Labs:   Recent Results (from the past 48 hour(s))   POCT glucose    Collection Time: 04/30/23  7:43 AM   Result Value Ref Range    POCT Glucose 203 (H) 70 - 110 mg/dL   Magnesium    Collection Time: 04/30/23  7:50 AM   Result Value Ref Range    Magnesium 1.3 (L) 1.6 - 2.6 mg/dL   Lipase    Collection Time: 04/30/23  7:50 AM   Result Value Ref Range    Lipase Result 453 (H) 23 - 300 U/L   Comprehensive Metabolic Panel    Collection Time: 04/30/23  7:50 AM   Result Value Ref Range    Sodium 131 (L) 136 - 145 mmol/L    Potassium 3.8 3.5 - 5.1 mmol/L    Chloride 97 95 - 110 mmol/L    CO2 23 23 - 29 mmol/L    Glucose 219 (H) 70 - 110 mg/dL    BUN 26 (H) 2 - 20 mg/dL    Creatinine 1.14 0.50 - 1.40 mg/dL    Calcium 9.6 8.7 - 10.5 mg/dL    Total Protein 8.7 (H) 6.0 - 8.4 g/dL    Albumin 4.8 3.5 - 5.2 g/dL    Total Bilirubin 1.1 (H) 0.1 - 1.0 mg/dL    Alkaline Phosphatase 110 38 - 126 U/L    AST 39 15 - 46 U/L    ALT 32 10 - 44 U/L    Anion Gap 11 8 - 16 mmol/L    eGFR >60.0 >60 mL/min/1.73 m^2   CBC Auto Differential    Collection Time: 04/30/23  7:50 AM   Result Value Ref Range    WBC 9.33 3.90 - 12.70 K/uL    RBC 4.56 (L) 4.60 - 6.20 M/uL    Hemoglobin 13.3 (L) 14.0 - 18.0 g/dL    Hematocrit 40.2 40.0 - 54.0 %    MCV 88 82 - 98 fL    MCH 29.2 27.0 - 31.0 pg    MCHC 33.1 32.0 - 36.0 g/dL    RDW 13.3 11.5 - 14.5 %    Platelets 328 150 - 450 K/uL    MPV 10.4 9.2 - 12.9 fL    Immature Granulocytes 0.2 0.0 - 0.5 %    Gran # (ANC) 6.9 1.8 - 7.7 K/uL    Immature Grans (Abs) 0.02 0.00 - 0.04 K/uL    Lymph # 1.9 1.0 - 4.8 K/uL    Mono # 0.5 0.3 - 1.0 K/uL    Eos # 0.0 0.0 - 0.5 K/uL    Baso # 0.02 0.00 - 0.20 K/uL    nRBC 0 0 /100 WBC    Gran % 73.5 (H) 38.0 - 73.0 %    Lymph % 20.7 18.0 - 48.0 %    Mono % 5.3 4.0 - 15.0 %    Eosinophil % 0.1 0.0 - 8.0 %    Basophil % 0.2 0.0 - 1.9 %    Differential Method Automated    Troponin I    Collection Time: 04/30/23  7:50 AM   Result Value Ref Range    Troponin I <0.012 0.012 - 0.034  ng/mL   CBC Oncology    Collection Time: 05/01/23 11:50 AM   Result Value Ref Range    WBC 8.04 3.90 - 12.70 K/uL    RBC 4.55 (L) 4.60 - 6.20 M/uL    Hemoglobin 13.0 (L) 14.0 - 18.0 g/dL    Hematocrit 40.6 40.0 - 54.0 %    MCV 89 82 - 98 fL    MCH 28.6 27.0 - 31.0 pg    MCHC 32.0 32.0 - 36.0 g/dL    RDW 13.3 11.5 - 14.5 %    Platelets 313 150 - 450 K/uL    MPV 9.6 9.2 - 12.9 fL    Gran # (ANC) 5.1 1.8 - 7.7 K/uL    Immature Grans (Abs) 0.02 0.00 - 0.04 K/uL   Comprehensive Metabolic Panel    Collection Time: 05/01/23 11:50 AM   Result Value Ref Range    Sodium 132 (L) 136 - 145 mmol/L    Potassium 4.0 3.5 - 5.1 mmol/L    Chloride 96 95 - 110 mmol/L    CO2 26 23 - 29 mmol/L    Glucose 202 (H) 70 - 110 mg/dL    BUN 17 6 - 20 mg/dL    Creatinine 1.1 0.5 - 1.4 mg/dL    Calcium 9.7 8.7 - 10.5 mg/dL    Total Protein 7.8 6.0 - 8.4 g/dL    Albumin 4.0 3.5 - 5.2 g/dL    Total Bilirubin 1.1 (H) 0.1 - 1.0 mg/dL    Alkaline Phosphatase 104 55 - 135 U/L    AST 25 10 - 40 U/L    ALT 26 10 - 44 U/L    Anion Gap 10 8 - 16 mmol/L    eGFR >60.0 >60 mL/min/1.73 m^2   Prostate Specific Antigen, Diagnostic    Collection Time: 05/01/23 11:50 AM   Result Value Ref Range    PSA Diagnostic <0.01 0.00 - 4.00 ng/mL            Imaging:     CT Abdomen Pelvis With Contrast  Narrative: EXAMINATION:  CT OF ABDOMEN PELVIS WITH    CLINICAL HISTORY:  Nausea vomiting for 2 days.  Last BM was 3-4 days ago.  Trickling urine this morning.  Treated for prostate carcinoma.    TECHNIQUE:  5 mm enhanced axial images were obtained from the lung bases through the greater trochanters.  One hundred mL of Omnipaque 350 was injected.    COMPARISON:  09/18/2021    FINDINGS:  There is mild fatty infiltration of the liver.    Spleen, pancreas, and right kidney are unremarkable. The gallbladder .    There is an 11-12 mm left upper pole renal cystic lesion.    There is redemonstration of bilateral adrenal lesions, which have not increased in size.    There is no  definite evidence for abdominal adenopathy or ascites.  Mild vascular calcifications are present.  Small fat containing umbilical hernia is seen.    Colonic diverticulosis is seen.  There is questionably a mildly thickened urinary bladder wall.  There are no pelvic masses or adenopathy.  An appendicolith is present.  The appendix is not inflamed.  The prostate gland is surgically absent.    There is no free fluid in the pelvis.    There is mild bibasilar atelectasis.  Impression: No acute abdominal or pelvic pathology CT of the abdomen and pelvis with contrast.    Mild fatty infiltration of the liver.    Indeterminate adrenal lesions, which were not increased in size.    Indeterminate 11-12 mm left upper pole renal cystic lesion.  Findings may represent a complex cyst.  If warranted, consider follow-up renal ultrasound when clinically appropriate.    Questionably a mildly thickened urinary bladder wall.  Findings may be due to infection or lower under distension.    Colonic diverticulosis.    This report was flagged in Epic as abnormal.    The preliminary and final reports are near concordant.    Electronically signed by: Anna Proctor  Date:    04/30/2023  Time:    19:04  X-Ray Chest AP Portable  Narrative: EXAMINATION:  AP PORTABLE CHEST    CLINICAL HISTORY:  Nausea    TECHNIQUE:  AP portable chest radiograph was submitted.    COMPARISON:  10/24/2020    FINDINGS:  AP portable chest radiograph demonstrates a cardiac silhouette within normal limits.  There is no focal consolidation, pneumothorax, or pleural effusion.  Impression: No acute intrathoracic abnormality detected.    The preliminary and final reports are concordant.    Electronically signed by: Anna Proctor  Date:    04/30/2023  Time:    18:47            Diagnoses:       1. Gastroparesis due to DM    2. Prostate cancer metastatic to intrapelvic lymph node    3. Thrush    4. Cannabis abuse    5. Hypertension, unspecified type    6. Type 2 diabetes  mellitus with diabetic polyneuropathy, without long-term current use of insulin    7. Gastroesophageal reflux disease, unspecified whether esophagitis present              Assessment and Plan:     1. Gastroparesis due to DM  Overview:  Episodes of intractable N/V and abdominal pain every few months thought due to gastroparesis +/- cannibas hyperemesis.    Assessment & Plan:  - Will hold his abiraterone for the next few weeks while he is recovering; continue prednisone 5mg daily  - RX for metoclopramide  - Referral to oncology psychology given his distress and possible cannabis hyperemesis  - Patient to return to ED if intractable pain or emesis    Orders:  -     Ambulatory referral/consult to Hematology/Oncology/Psychology; Future; Expected date: 05/08/2023  -     metoclopramide HCl (REGLAN) 10 MG tablet; Take 1 tablet (10 mg total) by mouth 4 (four) times daily before meals and nightly. for 14 days  Dispense: 56 tablet; Refill: 0  -     polyethylene glycol (GLYCOLAX) 17 gram PwPk; Take 17 g by mouth once daily.  Dispense: 30 each; Refill: 0    2. Prostate cancer metastatic to intrapelvic lymph node  Overview:  Intermediate risk [place] 4+3 disease sp R1 prostatectomy 2015 with adjuvant RT presumably to prostate bed. Now with PSA recurrence and PSMA imaging with regional maren metastases and limited intraabdominal maren mets.Started intensified hormonal therapy with ADT + abiraterone 02/2023. Plan for further consideration of RT.    Assessment & Plan:  PSA has remained under excellent control    - Will hold abiraterone for next few weeks; continue prednisone 5mg daily  - Has follow up in a few weeks for routine monitoring on AAP/ADT  - Resume AAP at that time if feeling better    - Triptorelin 11.25mg 3/29/23; next 6/21/23  - /5; on hold  - OTC Ca/D  - DEXA 3/27/23 - Low risk osteopenia; repeat 2 years      Orders:  -     Ambulatory referral/consult to Hematology/Oncology/Psychology; Future; Expected date:  05/08/2023    3. Thrush  -     fluconazole (DIFLUCAN) 200 MG Tab; Take 1 tablet (200 mg total) by mouth once daily. for 7 days  Dispense: 7 tablet; Refill: 0  -     nystatin (MYCOSTATIN) 100,000 unit/mL suspension; Take 6 mLs (600,000 Units total) by mouth 4 (four) times daily. for 14 days  Dispense: 336 mL; Refill: 0    4. Cannabis abuse  Assessment & Plan:  - Referral to oncology psychology      5. Hypertension, unspecified type  Overview:  Home medications include carvedilol, amlodipine, and losartan.     Assessment & Plan:  - Elevated in setting of not takign his home medicatiosn  - Will resume home meds      6. Type 2 diabetes mellitus with diabetic polyneuropathy, without long-term current use of insulin  Overview:  Home medications include metformin. No insulin use. Complicated by peripheral neuropathy and potentially gastroparesis.    Assessment & Plan:  - Follows with his PCP who has been titrating his metformin      7. Gastroesophageal reflux disease, unspecified whether esophagitis present  Overview:  Home medications include omeprazole                 Route Chart for Scheduling    Med Onc Chart Routing      Follow up with physician . As previously scheduled   Follow up with DONAVAN . As previously scheduled   Infusion scheduling note    Injection scheduling note    Labs    Imaging    Pharmacy appointment    Other referrals           Treatment Plan Information   OP ABIRATERONE DAILY PREDNISONE BID   Edward Zuleta MD   Upcoming Treatment Dates - OP ABIRATERONE DAILY PREDNISONE BID    No upcoming days in selected categories.       Edward Zuleta MD  Hematology/Oncology  [facility]

## 2023-05-01 NOTE — ASSESSMENT & PLAN NOTE
PSA has remained under excellent control    - Will hold abiraterone for next few weeks; continue prednisone 5mg daily  - Has follow up in a few weeks for routine monitoring on AAP/ADT  - Resume AAP at that time if feeling better    - Triptorelin 11.25mg 3/29/23; next 6/21/23  - /5; on hold  - OTC Ca/D  - DEXA 3/27/23 - Low risk osteopenia; repeat 2 years

## 2023-05-01 NOTE — PATIENT INSTRUCTIONS
- Hold abiraterone for the next two weeks  - Continue prednisone 5mg daily    - Start metoclopramide for abdominal pain and nausea  - Trial miralax powder to help with bowel movements  - Extra Strength tylenol 2 tablets up to 3x per day for additional pain control    - Resume carvedilol tonight; remainder of blood pressure medications tomorrow    - Start fluconazole 200mg daily x 7 days for thrush; can also use nystatin mouthwash 4 times daily to prevent thrush    - Referral to oncology psychology placed to help with management of marijuana overuse

## 2023-05-01 NOTE — TELEPHONE ENCOUNTER
I received a call from patient navigation, they had patient on the line. I asked them to put him through. Patient states he has been having nausea and vomiting, along with abdominal and chest pain since Friday. He has not taken his cancer medication since then. He sounds like he is in distress. I encouraged him to go to the Emergency Department, but he states he has been to several urgent cares and ED over the weekend. He said he came here to the ED and sat in the arboleda for 2 hours. He wants to see the doctor. I told him Dr. Zuleta had a opening at 1:30, he said he would be here. I asked him to come around 12:30 in case we needed some labs. He said he would be there.

## 2023-05-02 ENCOUNTER — TELEPHONE (OUTPATIENT)
Dept: PSYCHIATRY | Facility: CLINIC | Age: 58
End: 2023-05-02
Payer: MEDICAID

## 2023-05-04 ENCOUNTER — SPECIALTY PHARMACY (OUTPATIENT)
Dept: PHARMACY | Facility: CLINIC | Age: 58
End: 2023-05-04
Payer: MEDICAID

## 2023-05-08 ENCOUNTER — OFFICE VISIT (OUTPATIENT)
Dept: PSYCHIATRY | Facility: CLINIC | Age: 58
End: 2023-05-08
Payer: MEDICAID

## 2023-05-08 DIAGNOSIS — K31.84 GASTROPARESIS DUE TO DM: ICD-10-CM

## 2023-05-08 DIAGNOSIS — C61 PROSTATE CANCER METASTATIC TO INTRAPELVIC LYMPH NODE: ICD-10-CM

## 2023-05-08 DIAGNOSIS — C77.5 PROSTATE CANCER METASTATIC TO INTRAPELVIC LYMPH NODE: ICD-10-CM

## 2023-05-08 DIAGNOSIS — F41.1 GAD (GENERALIZED ANXIETY DISORDER): ICD-10-CM

## 2023-05-08 DIAGNOSIS — E11.43 GASTROPARESIS DUE TO DM: ICD-10-CM

## 2023-05-08 DIAGNOSIS — F32.1 CURRENT MODERATE EPISODE OF MAJOR DEPRESSIVE DISORDER, UNSPECIFIED WHETHER RECURRENT: Primary | ICD-10-CM

## 2023-05-08 PROCEDURE — 4010F PR ACE/ARB THEARPY RXD/TAKEN: ICD-10-PCS | Mod: AH,HB,CPTII, | Performed by: CASE MANAGER/CARE COORDINATOR

## 2023-05-08 PROCEDURE — 99211 OFF/OP EST MAY X REQ PHY/QHP: CPT | Mod: PBBFAC | Performed by: CASE MANAGER/CARE COORDINATOR

## 2023-05-08 PROCEDURE — 90791 PSYCH DIAGNOSTIC EVALUATION: CPT | Mod: AH,HB,, | Performed by: CASE MANAGER/CARE COORDINATOR

## 2023-05-08 PROCEDURE — 90791 PR PSYCHIATRIC DIAGNOSTIC EVALUATION: ICD-10-PCS | Mod: AH,HB,, | Performed by: CASE MANAGER/CARE COORDINATOR

## 2023-05-08 PROCEDURE — 99999 PR PBB SHADOW E&M-EST. PATIENT-LVL I: ICD-10-PCS | Mod: PBBFAC,HB,, | Performed by: CASE MANAGER/CARE COORDINATOR

## 2023-05-08 PROCEDURE — 4010F ACE/ARB THERAPY RXD/TAKEN: CPT | Mod: AH,HB,CPTII, | Performed by: CASE MANAGER/CARE COORDINATOR

## 2023-05-08 PROCEDURE — 99999 PR PBB SHADOW E&M-EST. PATIENT-LVL I: CPT | Mod: PBBFAC,HB,, | Performed by: CASE MANAGER/CARE COORDINATOR

## 2023-05-08 NOTE — PROGRESS NOTES
PSYCHO-ONCOLOGY INTAKE    Diagnostic Interview - CPT 52000    Date: 5/8/2023  Site: [facility]     Evaluation Length (direct face-to-face time):  1 hour    This includes face to face time and non-face to face time preparing to see the patient, obtaining and/or reviewing separately obtained history, documenting clinical information in the electronic or other health record, independently interpreting results and communicating results to the patient/family/caregiver, or care coordinator.     Referral Source: Edward Zuleta MD   Oncologist: Edward Zuleta MD   PCP: Kosta Roberts MD    Clinical status of patient: Outpatient    Mauro Benitez, a 57 y.o. male, seen for initial evaluation visit.    Mauro Benitez reviewed and agreed to informed consent and the limits of confidentiality.    Chief complaint/reason for encounter: adjustment to illness, depression, anxiety, sleep, and appetite    Medical/Surgical History:    Patient Active Problem List   Diagnosis    Chest pain    Hypertension    Type 2 diabetes mellitus with diabetic polyneuropathy, without long-term current use of insulin    GERD (gastroesophageal reflux disease)    Mixed hyperlipidemia    Tobacco abuse    Gastroparesis due to DM    Cannabis abuse    Elevated lipase    Diabetic peripheral neuropathy    Ganglion of right wrist    Prostate cancer metastatic to intrapelvic lymph node    Mass of both adrenal glands    Primary erectile dysfunction    Sleep apnea    Vitamin D deficiency    Depression       Health Behaviors:       ETOH Use: Yes, patient noted that he drinks on weekends only and that he had a couple of drinks this past weekend.       Tobacco Use: Yes, approximately 2 cigars daily.   Illicit Drug Use:  Patient noted that he previously used marijuana. Patient stated that he has not used marijuana in the past week.    Prescription Misuse:No   Caffeine: none to minimal   Exercise:The patient engages in environmental activity  only.   Firearms:  No   Advanced directives:No     Family History:   Psychiatric illness: Yes, patient shared that his mother had Schizophrenia and difficulties with seizures.     Alcohol/Drug Abuse: Yes, patient reported that his mother had difficulties with alcohol, an uncle had difficulties with alcohol, and that he has cousins who have had difficulties with substance abuse and alcohol.    Suicide: Yes, patient noted that his mother attempted suicide when patient was 12 or 13 years old.     Past Psychiatric History:     Previous treatment: Patient noted that he was previously incarcerated and that he attended therapy sessions while incarcerated.    Prior substance abuse treatment: Patient shared that he has previously attended NA and AA meetings. Patient noted that he previously used cocaine for about 22 years and stopped in 2014.    Suicide Attempts: No     Psychotropic Medications:  Current: Elavil       Past: Patient noted he previously was prescribed Effexor but stopped taking it about a week ago because he did not find it helpful    Current medications as per below, allergies reviewed in chart.    Current Outpatient Medications   Medication    abiraterone (ZYTIGA) 250 mg Tab    acetaminophen (TYLENOL) 500 MG tablet    amitriptyline (ELAVIL) 25 MG tablet    amLODIPine (NORVASC) 10 MG tablet    aspirin (ECOTRIN) 81 MG EC tablet    atorvastatin (LIPITOR) 20 MG tablet    blood sugar diagnostic (ONETOUCH VERIO TEST STRIPS MISC)    calcium carbonate (OS-CALOS) 500 mg calcium (1,250 mg) tablet    carvediloL (COREG) 25 MG tablet    cholecalciferol, vitamin D3, (VITAMIN D3) 25 mcg (1,000 unit) capsule    cyproheptadine (PERIACTIN) 4 mg tablet    fluconazole (DIFLUCAN) 200 MG Tab    fluticasone propionate (FLONASE) 50 mcg/actuation nasal spray    gabapentin (NEURONTIN) 400 MG capsule    loratadine (CLARITIN) 10 mg tablet    losartan (COZAAR) 100 MG tablet    metFORMIN (GLUCOPHAGE) 500 MG tablet    metoclopramide HCl  (REGLAN) 10 MG tablet    nystatin (MYCOSTATIN) 100,000 unit/mL suspension    omeprazole (PRILOSEC) 20 MG capsule    oxybutynin (DITROPAN XL) 15 MG TR24    polyethylene glycol (GLYCOLAX) 17 gram PwPk    predniSONE (DELTASONE) 5 MG tablet    sildenafiL (VIAGRA) 100 MG tablet    venlafaxine (EFFEXOR) 25 MG Tab     Current Facility-Administered Medications   Medication Frequency    triptorelin pamoate SusR 11.25 mg 1 time in Clinic/HOD              Social situation/Stressors: Mauro Benitez shared that he lives with his girlfriend in [place], [place].  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  Patient shared that he moved to [place] in 2019.  Mauro Benitez has been with his girlfriend for 8 years and has two adult children. Patient noted that he has a good relationship with his son and a difficult relationship with his daughter.  Patient shared that he sometimes talks to his girlfriend about his difficulties. Patient noted no support outside his girlfriend at this time.  The patient reports a lack of social support.  Mauro Benitez shared that he identifies as Holiness but does not currently have a home [employer]. Mauro Benitez noted low interest in activities at this time and was unable to report any current hobbies. Patient did state that he recently watched basketball game on television.  Additional stressors: Patient shared that his job currently provides him with additional stress due it being hot in the kitchen he works in. Patient noted that multiple times he has had to go to hospital due to dehydration.    Strengths:Patient came voluntarily to session today suggesting motivation to improve himself.  Liabilities: Complicated medical illness and Lack of social supports    Current Evaluation:     Mental Status Exam: Mauro Benitez arrived promptly for the assessment session.  The patient was fully cooperative throughout the interview and was an adequate historian   Appearance: age  appropriate, appropriately  dressed, adequately  groomed  Behavior/Cooperation: friendly and cooperative  Speech: normal in rate, volume, and tone and appropriate quality, quantity and organization of sentences  Mood: sad  Affect: mood congruent  Thought Process: goal-directed, logical  Thought Content: normal, no suicidality, no homicidality, delusions, or paranoia; did not appear to be responding to internal stimuli during the interview.   Orientation: grossly intact  Memory: grossly intact  Attention Span/Concentration: Attends to interview without distraction  Fund of Knowledge: average  Estimate of Intelligence: average from verbal skills and history  Cognition: grossly intact  Insight: patient has awareness of illness; good insight into own behavior and behavior of others  Judgment: the patient's behavior is adequate to circumstances      History of present illness:    Oncology History   Prostate cancer metastatic to intrapelvic lymph node   10/12/2015 Imaging Significant Findings    CT a/p: No evidence of distant or maren metastatic disease    Bone scan: No evidence of metastatic disease. 1cm rounded area of uptake along left superomedial orbit/sphenoid thought possibly calcified menigioma.     11/2/2015 Surgery    [facility] [place] Pathology with prostate adenocarcinoma up to [place] 4+3 with associated PNI. Positive margin. 0/4 LN involved. Staged pT3aN0     2/23/2016 Imaging Significant Findings    Nuclear bone scan: No evidence of metastatic disease. Note made of uptake in left supraorbital region thought c/w left frontal sinusitis along with uptake in mandible and maxilla c/w periosteal disease.     4/27/2021 Imaging Significant Findings    Bone scan:  There is abnormal increased radiotracer activity identified involving the maxilla and multiple paranasal sinuses likely related to periodontal disease and sinus disease. Recommend clinical correlation and  further evaluation with maxillofacial CT as warranted.     6/6/2022 Tumor Markers    PSA 2.8     6/21/2022 Imaging Significant Findings    PSMA PET-CT: In this patient with prostate malignancy status post prostatectomy, there are tracer-avid left para-aortic, presacral/left common iliac, and left external iliac chain nodes as described above, concerning for recurrent metastatic disease.     9/16/2022 -  Hormone Therapy    Start bicalutamide 50mg po daily     10/3/2022 -  Hormone Therapy    Trelstar 11.25mg IM x1     12/19/2022 Tumor Markers    PSA 0.02     1/4/2023 -  Hormone Therapy    Trelstart 11.25mg IM x1 with Dr. Edmond     2/1/2023 -  Chemotherapy    Treatment Summary   Plan Name: OP ABIRATERONE DAILY PREDNISONE BID  Treatment Goal: Curative  Status: Active  Start Date: 2/1/2023  End Date: 2/1/2023  Provider: Edward Zuleta MD  Chemotherapy: abiraterone (ZYTIGA) 250 mg Tab, 250 mg (100 % of original dose 250 mg), Oral, Daily, 1 of 1 cycle, Start date: 2/1/2023, End date: --  Dose modification: 250 mg (original dose 250 mg, Cycle 1)       3/27/2023 Imaging Significant Findings    DEXA - Low risk osteopenia; repeat 2 years     3/29/2023 -  Hormone Therapy    Triptorelin 11.25mg           Mauro Benitez has adjusted to illness with moderate difficulty primarily through passive coping strategies. He has engaged in limited information gathering.  The patient has inadequate family/friend support. Patient noted that he used to play sports with people prior to moving to [place]. The patient has a good partnership with [facility] treatment team. The patient reports the following barriers to cancer care:none at this time. Patient reports difficulties with low interest in activities and low mood. Patient noted that he finds himself arguing with his girlfriend.    NCCN Distress thermometer:   DISTRESS SCREENING 5/8/2023 5/1/2023 4/14/2023 3/16/2023 2/1/2023 1/23/2023   Distress Score 10 - Extreme Distress 10 -  Extreme Distress 0 - No Distress 2 5 0 - No Distress   Practical Problems Work/School - - - - None of these   Family Problems Dealing with Partner;Family Health Issues - - - - None of these   Emotional Problems Depression;Fears;Nervousness;Sadness;Worry;Loss of Interest - - - - None of these   Spiritual / Church Concerns No - - - - No   Physical Problems Constipation;Diarrhea;Fatigue;Memory/Concentration;Nausea;Nose Dry;Sexual;Sleep;Substance Abuse;Tingling in hands or feet - - - - None of these        GAD7 5/8/2023   1. Feeling nervous, anxious, or on edge? 2   2. Not being able to stop or control worrying? 3   3. Worrying too much about different things? 3   4. Trouble relaxing? 3   5. Being so restless that it is hard to sit still? 3   6. Becoming easily annoyed or irritable? 3   7. Feeling afraid as if something awful might happen? 3   ROSIBEL-7 Score 20          PHQ ANSWERS    Q1. Little interest or pleasure in doing things: Nearly every day (05/08/23 1037)  Q2. Feeling down, depressed, or hopeless: Nearly every day (05/08/23 1037)  Q3. Trouble falling or staying asleep, or sleeping too much: Nearly every day (05/08/23 1037)  Q4. Feeling tired or having little energy: Nearly every day (05/08/23 1037)  Q5. Poor appetite or overeating: Nearly every day (05/08/23 1037)  Q6. Feeling bad about yourself - or that you are a failure or have let yourself or your family down: Nearly every day (05/08/23 1037)  Q7. Trouble concentrating on things, such as reading the newspaper or watching television: More than half the days (05/08/23 1037)  Q8. Moving or speaking so slowly that other people could have noticed. Or the opposite - being so fidgety or restless that you have been moving around a lot more than usual: Not at all (05/08/23 1037)  Q9. Thoughts that you would be better off dead, or of hurting yourself in some way: Nearly every day (05/08/23 1037)    PHQ8 Score : 20 (05/08/23 1037)  PHQ-9 Total Score: 23 (05/08/23  "1037)     Symptoms:   Mood: Low interest in activities; low appetite and reportedly losing weight; low mood; patient reported depressive symptoms started following surgery in 2015; no HI; NCCN Distress Screener=10; PHQ-9=23; Patient noted that he has thoughts that he would be better off dead as he wants the "pain" to go away which is why he reportedly marked nearly every day for Q9 on PHQ-9, patient stated he feels like people do not want him around, patient denied any suicidal thoughts, plan, or intent, patient stated his children are protective factors for him against suicide, patient also agreed to call [phone] or present to local ED if he has suicidal thoughts in the future   Anxiety: Patient reported scared or worried thoughts about his health; irritability (patient noted that he is tired of going to the hospital); difficulty relaxing or controlling worry; stress in regards to heat in kitchen for current job as cook; patient noted anxiety has been present for years; ROSIBEL-7=20  Substance abuse: Patient noted that he stopped using cocaine in 2014. Patient shared that he has not used marijuana in the past week and that he intends on stopping at this time. Patient stated he feels like he is able to stop use on his own at this time.  Cognitive functioning:  No gross deficits appeared evident during the session.  Health behaviors:  Patient shared that he has been diagnosed with type I diabetes and that he has dental difficulties as well.  Sleep: Patient reported sleep onset difficulties and that he sleeps 4-5 hours per night.  Pain: Mr. Benitez rated his pain today as a 0 out of 10. Patient noted that at times he has pain in his stomach. Patient shared that he has been told that this pain may be due to marijuana use thus patient has stopped using marijuana at this time.  Pain Catastrophizing Scale: PCS total score (38; 90th%ile), helplessness (16; 87th%ile), magnification (8; 90th%ile), and rumination (19; " "91st%ile).      Assessment - Diagnosis - Goals:       ICD-10-CM ICD-9-CM   1. Current moderate episode of major depressive disorder, unspecified whether recurrent  F32.1 296.22   2. ROSIBEL (generalized anxiety disorder)  F41.1 300.02   3. Gastroparesis due to DM  E11.43 250.60    K31.84 536.3   4. Prostate cancer metastatic to intrapelvic lymph node  C61 185    C77.5 196.6         Plan:individual psychotherapy and referral to psychiatry for medication management    Summary and Recommendations  Mauro Benitez is a 57 y.o. male referred by Edward Zuleta MD for psychological evaluation and treatment.  Mr. Benitez appears to be having significant difficulty coping with his current medical health difficulties including his cancer diagnosis and treatment course. Patient noted worried thoughts about his health in general.  Patient was referred to Psychiatry for medication evaluation/management. Patient also noted difficulties with sleep and would likely benefit from working on increasing sleep quality. Discussed with patient CBT to help increase mood. He is interested in CBT to address depression, anxiety, and insomnia and will follow up with me for that purpose. He was encouraged to work on increasing pleasant event scheduling. Patient was recommended to think of one or two activities he can attempt to engage in for enjoyment. Patient agreed to this recommendation. Patient appears to have had had more support prior to moving to [place]. He may benefit from increasing his local social support. Patient denied suicidal thoughts, plan, or intent. He noted that he marked nearly every day for question 9 on PHQ-9 because he has thoughts that he would be better off dead because he wants the "pain" to go away but stated multiple times he would never attempt suicide. Patient agreed to call [phone] or present to ED if he experiences suicidal thoughts in the future.    Return to clinic: 1 week    GOALS:   Pleasant events " scheduling  Increasing coping skills for relaxation  Learn CBT skills to better manage mood and stress  Increase Sleep Quality        Camilo Burk Psy.D.  Clinical Psychologist  [place] License #[id number]

## 2023-05-09 ENCOUNTER — PATIENT MESSAGE (OUTPATIENT)
Dept: PHARMACY | Facility: CLINIC | Age: 58
End: 2023-05-09
Payer: MEDICAID

## 2023-05-10 ENCOUNTER — TELEPHONE (OUTPATIENT)
Dept: PSYCHIATRY | Facility: CLINIC | Age: 58
End: 2023-05-10
Payer: MEDICAID

## 2023-05-10 ENCOUNTER — TELEPHONE (OUTPATIENT)
Dept: HEMATOLOGY/ONCOLOGY | Facility: CLINIC | Age: 58
End: 2023-05-10
Payer: MEDICAID

## 2023-05-10 NOTE — PSYCH
SW attempted to contact Pt regarding scheduling with a [facility] provider per request of Oncology. SW was unable to leave a VM.

## 2023-05-11 ENCOUNTER — TELEPHONE (OUTPATIENT)
Dept: PSYCHIATRY | Facility: CLINIC | Age: 58
End: 2023-05-11
Payer: MEDICAID

## 2023-05-11 ENCOUNTER — PATIENT MESSAGE (OUTPATIENT)
Dept: HEMATOLOGY/ONCOLOGY | Facility: CLINIC | Age: 58
End: 2023-05-11
Payer: MEDICAID

## 2023-05-11 NOTE — PROGRESS NOTES
MEDICAL ONCOLOGY FOLLOW-UP VISIT.     Best Contact Phone Number(s): [phone] (home)      Cancer/Stage/TNM:    Cancer Staging   Prostate cancer metastatic to intrapelvic lymph node  Staging form: Prostate, AJCC 8th Edition  - Clinical: Stage IVB (cTX, cN1, cM1a, PSA: 2.8, Grade Group: 3) - Signed by Edward Zuleta MD on 2023       Reason for visit: Mauro Benitez is a 57 y.o. male history of prostate cancer sp RALP with positive margins 2015. He was subsequently found to have BCR with PSA 2.8 2022 with maren relapse on PSMA imaging and subsequently started hormonal therapy 10/2022.  Medical course notable for recurrent nausea and vomiting thought due to gastroparesis. He presents to medical oncology clinic for urgent visit for recurrent abdominal pain, nausea, vomting, and chest pain after being seen twice in the ED and once in urgent care over the last several days. He is on intensified ADT with abiraterone 250/5 since 2023 with plan for additional consideration of  RT therapy.    Interval History:     57 y.o. male, patient of Dr. Zuleta, to clinic for follow up. He restarted oral meds on . Tolerating well. He is feeling better than he did previously. No issues with nausea/vomiting. Biggest issue is fatigue. Still smoking, stopped use of marijuana. Struggling with constant thinking about his diagnosis. CBG at home 194 this morning.    Father  at 59 of prostate cancer. Has 1 son and 1 daughter. 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      Oncology History   Prostate cancer metastatic to intrapelvic lymph node   10/12/2015 Imaging Significant Findings    CT a/p: No evidence of distant or maren metastatic disease    Bone scan: No evidence of metastatic disease. 1cm rounded area of uptake along left superomedial orbit/sphenoid thought possibly calcified menigioma.     2015 Surgery    Radical prostatectomy at [facility] in [place]. Pathology with prostate  adenocarcinoma up to Jagdeep 4+3 with associated PNI. Positive margin. 0/4 LN involved. Staged pT3aN0     2/23/2016 Imaging Significant Findings    Nuclear bone scan: No evidence of metastatic disease. Note made of uptake in left supraorbital region thought c/w left frontal sinusitis along with uptake in mandible and maxilla c/w periosteal disease.     4/27/2021 Imaging Significant Findings    Bone scan:  There is abnormal increased radiotracer activity identified involving the maxilla and multiple paranasal sinuses likely related to periodontal disease and sinus disease. Recommend clinical correlation and further evaluation with maxillofacial CT as warranted.     6/6/2022 Tumor Markers    PSA 2.8     6/21/2022 Imaging Significant Findings    PSMA PET-CT: In this patient with prostate malignancy status post prostatectomy, there are tracer-avid left para-aortic, presacral/left common iliac, and left external iliac chain nodes as described above, concerning for recurrent metastatic disease.     9/16/2022 -  Hormone Therapy    Start bicalutamide 50mg po daily     10/3/2022 -  Hormone Therapy    Trelstar 11.25mg IM x1     12/19/2022 Tumor Markers    PSA 0.02     1/4/2023 -  Hormone Therapy    Trelstart 11.25mg IM x1 with Dr. Edmond     2/1/2023 -  Chemotherapy    Treatment Summary   Plan Name: OP ABIRATERONE DAILY PREDNISONE BID  Treatment Goal: Curative  Status: Active  Start Date: 2/1/2023  End Date: 2/1/2023  Provider: Edward Zuleta MD  Chemotherapy: abiraterone (ZYTIGA) 250 mg Tab, 250 mg (100 % of original dose 250 mg), Oral, Daily, 1 of 1 cycle, Start date: 2/1/2023, End date: --  Dose modification: 250 mg (original dose 250 mg, Cycle 1)     3/27/2023 Imaging Significant Findings    DEXA - Low risk osteopenia; repeat 2 years     3/29/2023 -  Hormone Therapy    Triptorelin 11.25mg            Physical Exam:   There were no vitals taken for this visit.     ECOG Performance Status: (foot note - ECOG PS provided by  [employer]) 1 - Symptomatic but completely ambulatory    Physical Exam  Constitutional:       Appearance: Normal appearance. He is not ill-appearing.   HENT:      Head: Normocephalic.   Eyes:      Extraocular Movements: Extraocular movements intact.      Conjunctiva/sclera: Conjunctivae normal.      Pupils: Pupils are equal, round, and reactive to light.   Cardiovascular:      Rate and Rhythm: Normal rate and regular rhythm.   Pulmonary:      Effort: Pulmonary effort is normal.      Breath sounds: Normal breath sounds.   Abdominal:      General: Bowel sounds are normal. There is no distension.      Palpations: Abdomen is soft.   Musculoskeletal:         General: No swelling. Normal range of motion.      Cervical back: Normal range of motion and neck supple.      Right lower leg: No edema.      Left lower leg: No edema.   Lymphadenopathy:      Cervical: No cervical adenopathy.   Skin:     General: Skin is warm and dry.      Coloration: Skin is not jaundiced.      Findings: No lesion.   Neurological:      General: No focal deficit present.      Mental Status: He is alert and oriented to person, place, and time.      Motor: No weakness.   Psychiatric:         Mood and Affect: Mood is anxious and depressed.         Behavior: Behavior normal.         Thought Content: Thought content normal.         Labs:   Recent Results (from the past 48 hour(s))   CBC Oncology    Collection Time: 05/12/23  7:02 AM   Result Value Ref Range    WBC 10.23 3.90 - 12.70 K/uL    RBC 3.73 (L) 4.60 - 6.20 M/uL    Hemoglobin 10.7 (L) 14.0 - 18.0 g/dL    Hematocrit 36.3 (L) 40.0 - 54.0 %    MCV 97 82 - 98 fL    MCH 28.7 27.0 - 31.0 pg    MCHC 29.5 (L) 32.0 - 36.0 g/dL    RDW 14.1 11.5 - 14.5 %    Platelets 278 150 - 450 K/uL    MPV 9.6 9.2 - 12.9 fL    Gran # (ANC) 6.4 1.8 - 7.7 K/uL    Immature Grans (Abs) 0.05 (H) 0.00 - 0.04 K/uL   Prostate Specific Antigen, Diagnostic    Collection Time: 05/12/23  7:02 AM   Result  Value Ref Range    PSA Diagnostic <0.01 0.00 - 4.00 ng/mL   Comprehensive Metabolic Panel    Collection Time: 05/12/23  7:02 AM   Result Value Ref Range    Sodium 131 (L) 136 - 145 mmol/L    Potassium 5.2 (H) 3.5 - 5.1 mmol/L    Chloride 101 95 - 110 mmol/L    CO2 21 (L) 23 - 29 mmol/L    Glucose 275 (H) 70 - 110 mg/dL    BUN 22 (H) 6 - 20 mg/dL    Creatinine 1.2 0.5 - 1.4 mg/dL    Calcium 9.6 8.7 - 10.5 mg/dL    Total Protein 6.9 6.0 - 8.4 g/dL    Albumin 3.6 3.5 - 5.2 g/dL    Total Bilirubin 0.7 0.1 - 1.0 mg/dL    Alkaline Phosphatase 91 55 - 135 U/L    AST 11 10 - 40 U/L    ALT 16 10 - 44 U/L    Anion Gap 9 8 - 16 mmol/L    eGFR >60.0 >60 mL/min/1.73 m^2   Hemoglobin A1c    Collection Time: 05/12/23  7:02 AM   Result Value Ref Range    Hemoglobin A1C 7.0 (H) 4.0 - 5.6 %    Estimated Avg Glucose 154 (H) 68 - 131 mg/dL            Imaging:     CT Abdomen Pelvis With Contrast  Narrative: EXAMINATION:  CT OF ABDOMEN PELVIS WITH    CLINICAL HISTORY:  Nausea vomiting for 2 days.  Last BM was 3-4 days ago.  Trickling urine this morning.  Treated for prostate carcinoma.    TECHNIQUE:  5 mm enhanced axial images were obtained from the lung bases through the greater trochanters.  One hundred mL of Omnipaque 350 was injected.    COMPARISON:  09/18/2021    FINDINGS:  There is mild fatty infiltration of the liver.    Spleen, pancreas, and right kidney are unremarkable. The gallbladder .    There is an 11-12 mm left upper pole renal cystic lesion.    There is redemonstration of bilateral adrenal lesions, which have not increased in size.    There is no definite evidence for abdominal adenopathy or ascites.  Mild vascular calcifications are present.  Small fat containing umbilical hernia is seen.    Colonic diverticulosis is seen.  There is questionably a mildly thickened urinary bladder wall.  There are no pelvic masses or adenopathy.  An appendicolith is present.  The appendix is not inflamed.  The prostate gland is surgically  absent.    There is no free fluid in the pelvis.    There is mild bibasilar atelectasis.  Impression: No acute abdominal or pelvic pathology CT of the abdomen and pelvis with contrast.    Mild fatty infiltration of the liver.    Indeterminate adrenal lesions, which were not increased in size.    Indeterminate 11-12 mm left upper pole renal cystic lesion.  Findings may represent a complex cyst.  If warranted, consider follow-up renal ultrasound when clinically appropriate.    Questionably a mildly thickened urinary bladder wall.  Findings may be due to infection or lower under distension.    Colonic diverticulosis.    This report was flagged in Epic as abnormal.    The preliminary and final reports are near concordant.    Electronically signed by: Anna Proctor  Date:    04/30/2023  Time:    19:04  X-Ray Chest AP Portable  Narrative: EXAMINATION:  AP PORTABLE CHEST    CLINICAL HISTORY:  Nausea    TECHNIQUE:  AP portable chest radiograph was submitted.    COMPARISON:  10/24/2020    FINDINGS:  AP portable chest radiograph demonstrates a cardiac silhouette within normal limits.  There is no focal consolidation, pneumothorax, or pleural effusion.  Impression: No acute intrathoracic abnormality detected.    The preliminary and final reports are concordant.    Electronically signed by: Anna Proctor  Date:    04/30/2023  Time:    18:47            Diagnoses:       1. Prostate cancer    2. Prostate cancer metastatic to intrapelvic lymph node    3. Encounter for monitoring androgen deprivation therapy    4. Type 2 diabetes mellitus with diabetic polyneuropathy, without long-term current use of insulin    5. Depression, unspecified depression type    6. Hot flashes         Assessment and Plan:   1,2,3- Prostate cancer metastatic to intrapelvic lymph node  Overview:  Intermediate risk Jagdeep 4+3 disease sp R1 prostatectomy 2015 with adjuvant RT presumably to prostate bed. Now with PSA recurrence and PSMA imaging with  regional maren metastases and limited intraabdominal maren mets.Started intensified hormonal therapy with ADT + abiraterone 02/2023. Plan for further consideration of RT.     Assessment & Plan:  PSA has remained under excellent control     - Will hold abiraterone for next few weeks; continue prednisone 5mg daily  - Has follow up in a few weeks for routine monitoring on AAP/ADT  - Resume AAP at that time if feeling better     - Triptorelin 11.25mg 3/29/23; next 6/21/23  - /5 continue this.  - OTC Ca/D  - DEXA 3/27/23 - Low risk osteopenia; repeat 2 years  -refer to genetics      4- recent change to metformin 1000 mg. Per patient, CBG better controlled and continuing to drop. It is 194 this morning. Following with PCP.     5-refer to [facility] to discuss potential therapies.    6- start Effexor 75mg. Previously used 25 mg without relief.    Patient is in agreement with the proposed treatment plan. All questions were answered to the patient's satisfaction. Pt knows to call clinic if anything is needed before the next clinic visit.    B[place], MSN, APRN, FNP-C  Hematology and Medical Oncology  Nurse Practitioner to Dr. Andrea Webber  Nurse Practitioner, [facility]

## 2023-05-11 NOTE — TELEPHONE ENCOUNTER
Specialty Pharmacy - Refill Coordination    Specialty Medication Orders Linked to Encounter      Flowsheet Row Most Recent Value   Medication #1 predniSONE (DELTASONE) 5 MG tablet (Order#[id number], Rx#[id number])   Medication #2 abiraterone (ZYTIGA) 250 mg Tab (Order#[id number], Rx#[id number])            Refill Questions - Documented Responses      Flowsheet Row Most Recent Value   Patient Availability and HIPAA Verification    Does patient want to proceed with activity? Yes   HIPAA/medical authority confirmed? Yes   Relationship to patient of person spoken to? Self   Refill Screening Questions    Changes to allergies? No   Changes to medications? No   New conditions since last clinic visit? No   Unplanned office visit, urgent care, ED, or hospital admission in the last 4 weeks? Yes  [sick with emesis - held medication for 5 days per MD. Now recovered.]   How does patient/caregiver feel medication is working? Good   Financial problems or insurance changes? No   How many doses of your specialty medications were missed in the last 4 weeks? 5   Would patient like to speak to a pharmacist? No   When does the patient need to receive the medication? 05/16/23   Refill Delivery Questions    How will the patient receive the medication? [facility]   When does the patient need to receive the medication? 05/16/23   Shipping Address Home   Address in [facility] confirmed and updated if neccessary? Yes   Expected Copay ($) 0   Is the patient able to afford the medication copay? Yes   Payment Method zero copay   Days supply of Refill 30   Supplies needed? No supplies needed   Refill activity completed? Yes   Refill activity plan Refill scheduled   Shipment/Pickup Date: 05/15/23            Current Outpatient Medications   Medication Sig    abiraterone (ZYTIGA) 250 mg Tab Take 1 tablet (250 mg total) by mouth once daily Take as directed with a low fat meal.    acetaminophen (TYLENOL) 500 MG tablet Take 1,500 mg by mouth daily as  needed for Pain.    amitriptyline (ELAVIL) 25 MG tablet Take 25 mg by mouth every evening.    amLODIPine (NORVASC) 10 MG tablet Take 10 mg by mouth every evening.    aspirin (ECOTRIN) 81 MG EC tablet Aspir-81 mg tablet,delayed release   Take 1 tablet every day by oral route in the morning for 30 days.    atorvastatin (LIPITOR) 20 MG tablet Take 20 mg by mouth once daily.    blood sugar diagnostic (ONETOUCH VERIO TEST STRIPS MISC) OneTouch Verio test strips    calcium carbonate (OS-CALOS) 500 mg calcium (1,250 mg) tablet Take 2 tablets (1,000 mg total) by mouth once daily.    carvediloL (COREG) 25 MG tablet Take 1 tablet (25 mg total) by mouth 2 (two) times daily.    cholecalciferol, vitamin D3, (VITAMIN D3) 25 mcg (1,000 unit) capsule Take 1 capsule (1,000 Units total) by mouth once daily.    cyproheptadine (PERIACTIN) 4 mg tablet Take by mouth.    fluticasone propionate (FLONASE) 50 mcg/actuation nasal spray fluticasone propionate 50 mcg/actuation nasal spray,suspension   SPRAY 2 SPRAYS EVERY DAY BY INTRANASAL ROUTE AS NEEDED.    gabapentin (NEURONTIN) 400 MG capsule Take 400 mg by mouth 3 (three) times daily.    loratadine (CLARITIN) 10 mg tablet loratadine 10 mg tablet    losartan (COZAAR) 100 MG tablet Take 100 mg by mouth once daily.    metFORMIN (GLUCOPHAGE) 500 MG tablet Take 500 mg by mouth 2 (two) times daily with meals.    metoclopramide HCl (REGLAN) 10 MG tablet Take 1 tablet (10 mg total) by mouth 4 (four) times daily before meals and nightly. for 14 days    nystatin (MYCOSTATIN) 100,000 unit/mL suspension Take 6 mLs (600,000 Units total) by mouth 4 (four) times daily. for 14 days    omeprazole (PRILOSEC) 20 MG capsule omeprazole 20 mg capsule,delayed release   TAKE 1 TABLET BY MOUTH EVERY DAY    oxybutynin (DITROPAN XL) 15 MG TR24 Take 1 tablet (15 mg total) by mouth once daily.    polyethylene glycol (GLYCOLAX) 17 gram PwPk Take 17 g by mouth once daily.    predniSONE (DELTASONE) 5 MG tablet Take 1  tablet (5 mg total) by mouth once daily. Take as directed with water on an empty stomach.    sildenafiL (VIAGRA) 100 MG tablet Take 100 mg by mouth daily as needed for Erectile Dysfunction.    venlafaxine (EFFEXOR) 25 MG Tab venlafaxine 25 mg tablet   TAKE 1 TABLET (25 MG TOTAL) BY MOUTH 2 (TWO) TIMES DAILY.   Last reviewed on 3/29/2023 10:02 AM by Edward Edmond MD    Review of patient's allergies indicates:   Allergen Reactions    Lisinopril Shortness Of Breath    Last reviewed on  5/1/2023 1:14 PM by Jana Maki      Tasks added this encounter   No tasks added.   Tasks due within next 3 months   8/2/2023 - Clinical Assessment (6 month recurrence)  5/12/2023 - Refill Coordination Outreach (1 time occurrence)     Mary Elliott, PharmD  [facility]  [address] [place] [id number]  Phone: [phone]  Fax: [phone]

## 2023-05-12 ENCOUNTER — LAB VISIT (OUTPATIENT)
Dept: LAB | Facility: HOSPITAL | Age: 58
End: 2023-05-12
Attending: HOSPITALIST
Payer: MEDICAID

## 2023-05-12 ENCOUNTER — TELEPHONE (OUTPATIENT)
Dept: HEMATOLOGY/ONCOLOGY | Facility: CLINIC | Age: 58
End: 2023-05-12
Payer: MEDICAID

## 2023-05-12 ENCOUNTER — OFFICE VISIT (OUTPATIENT)
Dept: HEMATOLOGY/ONCOLOGY | Facility: CLINIC | Age: 58
End: 2023-05-12
Payer: MEDICAID

## 2023-05-12 VITALS
HEIGHT: 71 IN | WEIGHT: 210.13 LBS | RESPIRATION RATE: 18 BRPM | DIASTOLIC BLOOD PRESSURE: 89 MMHG | HEART RATE: 86 BPM | TEMPERATURE: 99 F | BODY MASS INDEX: 29.42 KG/M2 | SYSTOLIC BLOOD PRESSURE: 154 MMHG | OXYGEN SATURATION: 99 %

## 2023-05-12 DIAGNOSIS — E11.42 TYPE 2 DIABETES MELLITUS WITH DIABETIC POLYNEUROPATHY, WITHOUT LONG-TERM CURRENT USE OF INSULIN: ICD-10-CM

## 2023-05-12 DIAGNOSIS — F32.A DEPRESSION, UNSPECIFIED DEPRESSION TYPE: ICD-10-CM

## 2023-05-12 DIAGNOSIS — C61 PROSTATE CANCER METASTATIC TO INTRAPELVIC LYMPH NODE: ICD-10-CM

## 2023-05-12 DIAGNOSIS — R23.2 HOT FLASHES: ICD-10-CM

## 2023-05-12 DIAGNOSIS — C77.5 PROSTATE CANCER METASTATIC TO INTRAPELVIC LYMPH NODE: ICD-10-CM

## 2023-05-12 DIAGNOSIS — E11.42 DIABETIC PERIPHERAL NEUROPATHY: ICD-10-CM

## 2023-05-12 DIAGNOSIS — G89.29 CHRONIC LOW BACK PAIN: ICD-10-CM

## 2023-05-12 DIAGNOSIS — R23.2 HOT FLASHES: Primary | ICD-10-CM

## 2023-05-12 DIAGNOSIS — M54.50 CHRONIC LOW BACK PAIN: ICD-10-CM

## 2023-05-12 DIAGNOSIS — Z79.818 ENCOUNTER FOR MONITORING ANDROGEN DEPRIVATION THERAPY: ICD-10-CM

## 2023-05-12 DIAGNOSIS — C61 PROSTATE CANCER: Primary | ICD-10-CM

## 2023-05-12 DIAGNOSIS — C61 PROSTATE CANCER: ICD-10-CM

## 2023-05-12 DIAGNOSIS — E87.5 HYPERKALEMIA: ICD-10-CM

## 2023-05-12 LAB
ALBUMIN SERPL BCP-MCNC: 3.6 G/DL (ref 3.5–5.2)
ALP SERPL-CCNC: 91 U/L (ref 55–135)
ALT SERPL W/O P-5'-P-CCNC: 16 U/L (ref 10–44)
ANION GAP SERPL CALC-SCNC: 9 MMOL/L (ref 8–16)
AST SERPL-CCNC: 11 U/L (ref 10–40)
BILIRUB SERPL-MCNC: 0.7 MG/DL (ref 0.1–1)
BUN SERPL-MCNC: 22 MG/DL (ref 6–20)
CALCIUM SERPL-MCNC: 9.6 MG/DL (ref 8.7–10.5)
CHLORIDE SERPL-SCNC: 101 MMOL/L (ref 95–110)
CO2 SERPL-SCNC: 21 MMOL/L (ref 23–29)
COMPLEXED PSA SERPL-MCNC: <0.01 NG/ML (ref 0–4)
CREAT SERPL-MCNC: 1.2 MG/DL (ref 0.5–1.4)
ERYTHROCYTE [DISTWIDTH] IN BLOOD BY AUTOMATED COUNT: 14.1 % (ref 11.5–14.5)
EST. GFR  (NO RACE VARIABLE): >60 ML/MIN/1.73 M^2
ESTIMATED AVG GLUCOSE: 154 MG/DL (ref 68–131)
GLUCOSE SERPL-MCNC: 275 MG/DL (ref 70–110)
HBA1C MFR BLD: 7 % (ref 4–5.6)
HCT VFR BLD AUTO: 36.3 % (ref 40–54)
HGB BLD-MCNC: 10.7 G/DL (ref 14–18)
IMM GRANULOCYTES # BLD AUTO: 0.05 K/UL (ref 0–0.04)
MCH RBC QN AUTO: 28.7 PG (ref 27–31)
MCHC RBC AUTO-ENTMCNC: 29.5 G/DL (ref 32–36)
MCV RBC AUTO: 97 FL (ref 82–98)
NEUTROPHILS # BLD AUTO: 6.4 K/UL (ref 1.8–7.7)
PLATELET # BLD AUTO: 278 K/UL (ref 150–450)
PMV BLD AUTO: 9.6 FL (ref 9.2–12.9)
POTASSIUM SERPL-SCNC: 5.2 MMOL/L (ref 3.5–5.1)
PROT SERPL-MCNC: 6.9 G/DL (ref 6–8.4)
RBC # BLD AUTO: 3.73 M/UL (ref 4.6–6.2)
SODIUM SERPL-SCNC: 131 MMOL/L (ref 136–145)
WBC # BLD AUTO: 10.23 K/UL (ref 3.9–12.7)

## 2023-05-12 PROCEDURE — 36415 COLL VENOUS BLD VENIPUNCTURE: CPT | Performed by: HOSPITALIST

## 2023-05-12 PROCEDURE — 3077F SYST BP >= 140 MM HG: CPT | Mod: CPTII,,, | Performed by: NURSE PRACTITIONER

## 2023-05-12 PROCEDURE — 99214 PR OFFICE/OUTPT VISIT, EST, LEVL IV, 30-39 MIN: ICD-10-PCS | Mod: S$PBB,,, | Performed by: NURSE PRACTITIONER

## 2023-05-12 PROCEDURE — 99999 PR PBB SHADOW E&M-EST. PATIENT-LVL III: ICD-10-PCS | Mod: PBBFAC,,, | Performed by: NURSE PRACTITIONER

## 2023-05-12 PROCEDURE — 4010F ACE/ARB THERAPY RXD/TAKEN: CPT | Mod: CPTII,,, | Performed by: NURSE PRACTITIONER

## 2023-05-12 PROCEDURE — 80053 COMPREHEN METABOLIC PANEL: CPT | Performed by: HOSPITALIST

## 2023-05-12 PROCEDURE — 3008F BODY MASS INDEX DOCD: CPT | Mod: CPTII,,, | Performed by: NURSE PRACTITIONER

## 2023-05-12 PROCEDURE — 85027 COMPLETE CBC AUTOMATED: CPT | Performed by: HOSPITALIST

## 2023-05-12 PROCEDURE — 99999 PR PBB SHADOW E&M-EST. PATIENT-LVL III: CPT | Mod: PBBFAC,,, | Performed by: NURSE PRACTITIONER

## 2023-05-12 PROCEDURE — 84153 ASSAY OF PSA TOTAL: CPT | Performed by: HOSPITALIST

## 2023-05-12 PROCEDURE — 83036 HEMOGLOBIN GLYCOSYLATED A1C: CPT | Performed by: HOSPITALIST

## 2023-05-12 PROCEDURE — 3077F PR MOST RECENT SYSTOLIC BLOOD PRESSURE >= 140 MM HG: ICD-10-PCS | Mod: CPTII,,, | Performed by: NURSE PRACTITIONER

## 2023-05-12 PROCEDURE — 4010F PR ACE/ARB THEARPY RXD/TAKEN: ICD-10-PCS | Mod: CPTII,,, | Performed by: NURSE PRACTITIONER

## 2023-05-12 PROCEDURE — 3051F PR MOST RECENT HEMOGLOBIN A1C LEVEL 7.0 - < 8.0%: ICD-10-PCS | Mod: CPTII,,, | Performed by: NURSE PRACTITIONER

## 2023-05-12 PROCEDURE — 99214 OFFICE O/P EST MOD 30 MIN: CPT | Mod: S$PBB,,, | Performed by: NURSE PRACTITIONER

## 2023-05-12 PROCEDURE — 3079F PR MOST RECENT DIASTOLIC BLOOD PRESSURE 80-89 MM HG: ICD-10-PCS | Mod: CPTII,,, | Performed by: NURSE PRACTITIONER

## 2023-05-12 PROCEDURE — 3051F HG A1C>EQUAL 7.0%<8.0%: CPT | Mod: CPTII,,, | Performed by: NURSE PRACTITIONER

## 2023-05-12 PROCEDURE — 3008F PR BODY MASS INDEX (BMI) DOCUMENTED: ICD-10-PCS | Mod: CPTII,,, | Performed by: NURSE PRACTITIONER

## 2023-05-12 PROCEDURE — 3079F DIAST BP 80-89 MM HG: CPT | Mod: CPTII,,, | Performed by: NURSE PRACTITIONER

## 2023-05-12 PROCEDURE — 99213 OFFICE O/P EST LOW 20 MIN: CPT | Mod: PBBFAC | Performed by: NURSE PRACTITIONER

## 2023-05-12 RX ORDER — VENLAFAXINE HYDROCHLORIDE 75 MG/1
75 CAPSULE, EXTENDED RELEASE ORAL DAILY
Qty: 30 CAPSULE | Refills: 2 | Status: SHIPPED | OUTPATIENT
Start: 2023-05-12 | End: 2023-08-29 | Stop reason: SDUPTHER

## 2023-05-12 NOTE — NURSING
Patient contacted for Integrative Oncology referral, unable to leave a voicemail, will attempt portal message and retry patient later today, BENJIE Johnson.

## 2023-05-18 ENCOUNTER — TELEPHONE (OUTPATIENT)
Dept: HEMATOLOGY/ONCOLOGY | Facility: CLINIC | Age: 58
End: 2023-05-18
Payer: MEDICAID

## 2023-05-18 NOTE — TELEPHONE ENCOUNTER
----- Message from She Arrington sent at 5/18/2023  8:28 AM CDT -----  Regarding: patient advice              Name of Caller: Mauro Benitez         Contact Preference: [phone]        Nature of Call: Patient requesting a call back to discuss new medication changes States high dosage make him feel different would like to discuss

## 2023-05-21 LAB
DNA RANGE(S) EXAMINED NAR: NORMAL
GENE DIS ANL INTERP-IMP: POSITIVE
GENE DIS ASSESSED: NORMAL
MSI CA SPEC-IMP: NOT DETECTED
REASON FOR STUDY: NORMAL
TEMPUS LCA: NORMAL
TEMPUS PORTAL: NORMAL

## 2023-06-07 ENCOUNTER — PATIENT MESSAGE (OUTPATIENT)
Dept: PHARMACY | Facility: CLINIC | Age: 58
End: 2023-06-07
Payer: MEDICAID

## 2023-06-09 ENCOUNTER — LAB VISIT (OUTPATIENT)
Dept: LAB | Facility: HOSPITAL | Age: 58
End: 2023-06-09
Attending: HOSPITALIST
Payer: MEDICAID

## 2023-06-09 ENCOUNTER — OFFICE VISIT (OUTPATIENT)
Dept: HEMATOLOGY/ONCOLOGY | Facility: CLINIC | Age: 58
End: 2023-06-09
Payer: MEDICAID

## 2023-06-09 VITALS
TEMPERATURE: 98 F | DIASTOLIC BLOOD PRESSURE: 85 MMHG | SYSTOLIC BLOOD PRESSURE: 131 MMHG | HEIGHT: 71 IN | BODY MASS INDEX: 30.62 KG/M2 | WEIGHT: 218.69 LBS | HEART RATE: 94 BPM

## 2023-06-09 DIAGNOSIS — E11.42 DIABETIC PERIPHERAL NEUROPATHY: ICD-10-CM

## 2023-06-09 DIAGNOSIS — K31.84 GASTROPARESIS DUE TO DM: ICD-10-CM

## 2023-06-09 DIAGNOSIS — Z79.899 LONG TERM CURRENT USE OF THERAPEUTIC DRUG: ICD-10-CM

## 2023-06-09 DIAGNOSIS — E11.43 GASTROPARESIS DUE TO DM: ICD-10-CM

## 2023-06-09 DIAGNOSIS — C77.5 PROSTATE CANCER METASTATIC TO INTRAPELVIC LYMPH NODE: Primary | ICD-10-CM

## 2023-06-09 DIAGNOSIS — C61 PROSTATE CANCER METASTATIC TO INTRAPELVIC LYMPH NODE: Primary | ICD-10-CM

## 2023-06-09 DIAGNOSIS — E11.42 TYPE 2 DIABETES MELLITUS WITH DIABETIC POLYNEUROPATHY, WITHOUT LONG-TERM CURRENT USE OF INSULIN: ICD-10-CM

## 2023-06-09 DIAGNOSIS — E78.2 MIXED HYPERLIPIDEMIA: ICD-10-CM

## 2023-06-09 DIAGNOSIS — Z79.818 ANDROGEN DEPRIVATION THERAPY: ICD-10-CM

## 2023-06-09 DIAGNOSIS — K21.9 GASTROESOPHAGEAL REFLUX DISEASE, UNSPECIFIED WHETHER ESOPHAGITIS PRESENT: ICD-10-CM

## 2023-06-09 DIAGNOSIS — I10 HYPERTENSION, UNSPECIFIED TYPE: ICD-10-CM

## 2023-06-09 DIAGNOSIS — C61 PROSTATE CANCER: ICD-10-CM

## 2023-06-09 LAB
ALBUMIN SERPL BCP-MCNC: 3.2 G/DL (ref 3.5–5.2)
ALP SERPL-CCNC: 105 U/L (ref 55–135)
ALT SERPL W/O P-5'-P-CCNC: 20 U/L (ref 10–44)
ANION GAP SERPL CALC-SCNC: 10 MMOL/L (ref 8–16)
AST SERPL-CCNC: 16 U/L (ref 10–40)
BILIRUB SERPL-MCNC: 0.4 MG/DL (ref 0.1–1)
BUN SERPL-MCNC: 21 MG/DL (ref 6–20)
CALCIUM SERPL-MCNC: 9.6 MG/DL (ref 8.7–10.5)
CHLORIDE SERPL-SCNC: 103 MMOL/L (ref 95–110)
CO2 SERPL-SCNC: 23 MMOL/L (ref 23–29)
COMPLEXED PSA SERPL-MCNC: <0.01 NG/ML (ref 0–4)
CREAT SERPL-MCNC: 1.4 MG/DL (ref 0.5–1.4)
ERYTHROCYTE [DISTWIDTH] IN BLOOD BY AUTOMATED COUNT: 14.7 % (ref 11.5–14.5)
EST. GFR  (NO RACE VARIABLE): 58.3 ML/MIN/1.73 M^2
GLUCOSE SERPL-MCNC: 368 MG/DL (ref 70–110)
HCT VFR BLD AUTO: 33.4 % (ref 40–54)
HGB BLD-MCNC: 9.9 G/DL (ref 14–18)
IMM GRANULOCYTES # BLD AUTO: 0.04 K/UL (ref 0–0.04)
MCH RBC QN AUTO: 28.9 PG (ref 27–31)
MCHC RBC AUTO-ENTMCNC: 29.6 G/DL (ref 32–36)
MCV RBC AUTO: 97 FL (ref 82–98)
NEUTROPHILS # BLD AUTO: 5.7 K/UL (ref 1.8–7.7)
PLATELET # BLD AUTO: 341 K/UL (ref 150–450)
PMV BLD AUTO: 9.9 FL (ref 9.2–12.9)
POTASSIUM SERPL-SCNC: 5 MMOL/L (ref 3.5–5.1)
PROT SERPL-MCNC: 7 G/DL (ref 6–8.4)
RBC # BLD AUTO: 3.43 M/UL (ref 4.6–6.2)
SODIUM SERPL-SCNC: 136 MMOL/L (ref 136–145)
WBC # BLD AUTO: 8.31 K/UL (ref 3.9–12.7)

## 2023-06-09 PROCEDURE — 99215 OFFICE O/P EST HI 40 MIN: CPT | Mod: S$PBB,,, | Performed by: HOSPITALIST

## 2023-06-09 PROCEDURE — 99999 PR PBB SHADOW E&M-EST. PATIENT-LVL IV: ICD-10-PCS | Mod: PBBFAC,,, | Performed by: HOSPITALIST

## 2023-06-09 PROCEDURE — 3075F SYST BP GE 130 - 139MM HG: CPT | Mod: CPTII,,, | Performed by: HOSPITALIST

## 2023-06-09 PROCEDURE — 3051F PR MOST RECENT HEMOGLOBIN A1C LEVEL 7.0 - < 8.0%: ICD-10-PCS | Mod: CPTII,,, | Performed by: HOSPITALIST

## 2023-06-09 PROCEDURE — 3008F BODY MASS INDEX DOCD: CPT | Mod: CPTII,,, | Performed by: HOSPITALIST

## 2023-06-09 PROCEDURE — 36415 COLL VENOUS BLD VENIPUNCTURE: CPT | Performed by: HOSPITALIST

## 2023-06-09 PROCEDURE — 99215 PR OFFICE/OUTPT VISIT, EST, LEVL V, 40-54 MIN: ICD-10-PCS | Mod: S$PBB,,, | Performed by: HOSPITALIST

## 2023-06-09 PROCEDURE — 3075F PR MOST RECENT SYSTOLIC BLOOD PRESS GE 130-139MM HG: ICD-10-PCS | Mod: CPTII,,, | Performed by: HOSPITALIST

## 2023-06-09 PROCEDURE — 84153 ASSAY OF PSA TOTAL: CPT | Performed by: HOSPITALIST

## 2023-06-09 PROCEDURE — 3079F PR MOST RECENT DIASTOLIC BLOOD PRESSURE 80-89 MM HG: ICD-10-PCS | Mod: CPTII,,, | Performed by: HOSPITALIST

## 2023-06-09 PROCEDURE — 99999 PR PBB SHADOW E&M-EST. PATIENT-LVL IV: CPT | Mod: PBBFAC,,, | Performed by: HOSPITALIST

## 2023-06-09 PROCEDURE — 85027 COMPLETE CBC AUTOMATED: CPT | Performed by: HOSPITALIST

## 2023-06-09 PROCEDURE — 3008F PR BODY MASS INDEX (BMI) DOCUMENTED: ICD-10-PCS | Mod: CPTII,,, | Performed by: HOSPITALIST

## 2023-06-09 PROCEDURE — 3079F DIAST BP 80-89 MM HG: CPT | Mod: CPTII,,, | Performed by: HOSPITALIST

## 2023-06-09 PROCEDURE — 80053 COMPREHEN METABOLIC PANEL: CPT | Performed by: HOSPITALIST

## 2023-06-09 PROCEDURE — 3051F HG A1C>EQUAL 7.0%<8.0%: CPT | Mod: CPTII,,, | Performed by: HOSPITALIST

## 2023-06-09 PROCEDURE — 4010F PR ACE/ARB THEARPY RXD/TAKEN: ICD-10-PCS | Mod: CPTII,,, | Performed by: HOSPITALIST

## 2023-06-09 PROCEDURE — 99214 OFFICE O/P EST MOD 30 MIN: CPT | Mod: PBBFAC | Performed by: HOSPITALIST

## 2023-06-09 PROCEDURE — 4010F ACE/ARB THERAPY RXD/TAKEN: CPT | Mod: CPTII,,, | Performed by: HOSPITALIST

## 2023-06-09 NOTE — LETTER
June 9, 2023    Mauro Benitez  [address] [address]             [facility]  [address] [address]  Phone: [phone] To whom it may concern:    Due to long standing side effects from a prior prostate surgery, Mr. Benitez should not lift over 25 pounds of weight. Please allow any reasonable accommodations for his ongoing medical care.    If you have any questions or concerns, please don't hesitate to call.    Sincerely,        Edward Zuleta MD

## 2023-06-09 NOTE — ASSESSMENT & PLAN NOTE
Tolerating hormonal therapy well aside from hot flashes. PSA remains undetectable. Could not tolerate venlafaxien 75mg daily    - Continue /5 + ADT per below  - Referral placed to radiation oncology  - Reduce venlafaxine to 37.5mg

## 2023-06-09 NOTE — ASSESSMENT & PLAN NOTE
Androgen deprivation therapy  Start date: 10/22  Last dose: Triptorelin 11.25mg 3/29/23  Next dose: 6/21/23 - gets with Dr. Edmond    Novel Androgen signaling inhibitors:   Abiraterone + Prednisone 250/5  Start date: 02/2023  - Well tolerated; repeat labs in 6 weeks; MD visit in 12 weeks    Cardiovascular health:  Primary care physician: Kosta Roberts MD   ASCVD risk score: NA  High blood pressure: Yes  A1c: 7.0 5/2023 - has follow up with Dr. Roberts  LDL 60.4 - 01/2021  Antilipid: Atorvastatin  Antiplatelet therapy:  Aspirin  BMI: 30.5 - referred to nutrition    Bone health  DEXA scan: 3/27/23 - low risk osteopenia; repeat 03/2025  Vitamin D: 1000 IU daily  Calcium: Calcium carbonate bid    Genomic testing:  Germline genetic testing: Referral placed; needs scheduling  Somatic tumor genotyping: None  ctDNA genotyping: [facility] xT 5/21/23: TP53 mutation

## 2023-06-09 NOTE — PROGRESS NOTES
[facility] - NEW PATIENT VISIT     Best Contact Phone Number(s): [phone] (home)       Cancer/Stage/TNM:    Cancer Staging   Prostate cancer metastatic to intrapelvic lymph node  Staging form: Prostate, AJCC 8th Edition  - Clinical: Stage IVB (cTX, cN1, cM1a, PSA: 2.8, Grade Group: 3) - Signed by Edward Zuleta MD on 2/1/2023        Reason for visit: Mauro Benitez is a 57 y.o. male with history of prostate cancer sp RALP with positive margins 11/2/2015. He was subsequently found to have BCR with PSA 2.8 06/2022 with maren relapse on PSMA imaging and subsequently started hormonal therapy 10/2022 with addition of /5 started 02/2023.  Medical course notable for recurrent nausea and vomiting thought due to gastroparesis.     History has been obtained by chart review and discussion with the patient.     HPI:     Overall feeling generally well. Recently stopped metformin with improvement in his energy and abdominal symptoms. Switched to alternate OAD (maybe Januvia?). Has follow up with his PCP.    Increased venaflaxine to 75mg, but feels like 'the world was spinning' so reduced back to 37.5mg. Also with ongoing dry mouth.    Blood sugars have been running higher in setting of stopping metformin, into the low 200's. Has follow up with Dr. Roberts on 6/20/23.    Notes hot flashes in the morning. Improved from prior. No CP, SOB or cough. No palpitations. No leg swelling.    Notes stress incontinence at work when picking up heavy objects        Oncology History   Prostate cancer metastatic to intrapelvic lymph node   10/12/2015 Imaging Significant Findings    CT a/p: No evidence of distant or maren metastatic disease    Bone scan: No evidence of metastatic disease. 1cm rounded area of uptake along left superomedial orbit/sphenoid thought possibly calcified menigioma.     11/2/2015 Surgery    Radical prostatectomy at [facility] in [place]. Pathology with prostate  adenocarcinoma up to Jagdeep 4+3 with associated PNI. Positive margin. 0/4 LN involved. Staged pT3aN0     2/23/2016 Imaging Significant Findings    Nuclear bone scan: No evidence of metastatic disease. Note made of uptake in left supraorbital region thought c/w left frontal sinusitis along with uptake in mandible and maxilla c/w periosteal disease.     4/27/2021 Imaging Significant Findings    Bone scan:  There is abnormal increased radiotracer activity identified involving the maxilla and multiple paranasal sinuses likely related to periodontal disease and sinus disease. Recommend clinical correlation and further evaluation with maxillofacial CT as warranted.     6/6/2022 Tumor Markers    PSA 2.8     6/21/2022 Imaging Significant Findings    PSMA PET-CT: In this patient with prostate malignancy status post prostatectomy, there are tracer-avid left para-aortic, presacral/left common iliac, and left external iliac chain nodes as described above, concerning for recurrent metastatic disease.     9/16/2022 -  Hormone Therapy    Start bicalutamide 50mg po daily     10/3/2022 -  Hormone Therapy    Trelstar 11.25mg IM x1     12/19/2022 Tumor Markers    PSA 0.02     1/4/2023 -  Hormone Therapy    Trelstart 11.25mg IM x1 with Dr. Edmond     2/1/2023 -  Chemotherapy    Treatment Summary   Plan Name: OP ABIRATERONE DAILY PREDNISONE BID  Treatment Goal: Curative  Status: Active  Start Date: 2/1/2023  End Date: 2/1/2023  Provider: Edward Zuleta MD  Chemotherapy: abiraterone (ZYTIGA) 250 mg Tab, 250 mg (100 % of original dose 250 mg), Oral, Daily, 1 of 1 cycle, Start date: 2/1/2023, End date: --  Dose modification: 250 mg (original dose 250 mg, Cycle 1)     3/27/2023 Imaging Significant Findings    DEXA - Low risk osteopenia; repeat 2 years     3/29/2023 -  Hormone Therapy    Triptorelin 11.25mg           Past Medical History:   Diagnosis Date    Diabetes mellitus     GERD (gastroesophageal reflux disease)     Hypertension      Prostate cancer          Past Surgical History:   Procedure Laterality Date    ESOPHAGOGASTRODUODENOSCOPY N/A 12/5/2022    Procedure: EGD (ESOPHAGOGASTRODUODENOSCOPY);  Surgeon: Beronica Valera MD;  Location: [facility];  Service: Endoscopy;  Laterality: N/A;    PROSTATECTOMY           I have reviewed and updated the patient's past medical, surgical, family and social histories.     Review of patient's allergies indicates:   Allergen Reactions    Lisinopril Shortness Of Breath         Current Outpatient Medications   Medication Sig Dispense Refill    abiraterone (ZYTIGA) 250 mg Tab Take 1 tablet (250 mg total) by mouth once daily Take as directed with a low fat meal. 30 tablet 11    acetaminophen (TYLENOL) 500 MG tablet Take 1,500 mg by mouth daily as needed for Pain.      amitriptyline (ELAVIL) 25 MG tablet Take 25 mg by mouth every evening.      amLODIPine (NORVASC) 10 MG tablet Take 10 mg by mouth every evening.      aspirin (ECOTRIN) 81 MG EC tablet Aspir-81 mg tablet,delayed release   Take 1 tablet every day by oral route in the morning for 30 days.      atorvastatin (LIPITOR) 20 MG tablet Take 20 mg by mouth once daily.      blood sugar diagnostic (ONETOUCH VERIO TEST STRIPS MISC) OneTouch Verio test strips      calcium carbonate (OS-CALOS) 500 mg calcium (1,250 mg) tablet Take 2 tablets (1,000 mg total) by mouth once daily. 60 tablet 11    carvediloL (COREG) 25 MG tablet Take 1 tablet (25 mg total) by mouth 2 (two) times daily. 60 tablet 11    cholecalciferol, vitamin D3, (VITAMIN D3) 25 mcg (1,000 unit) capsule Take 1 capsule (1,000 Units total) by mouth once daily. 30 capsule 11    fluticasone propionate (FLONASE) 50 mcg/actuation nasal spray fluticasone propionate 50 mcg/actuation nasal spray,suspension   SPRAY 2 SPRAYS EVERY DAY BY INTRANASAL ROUTE AS NEEDED.      gabapentin (NEURONTIN) 400 MG capsule Take 400 mg by mouth 3 (three) times daily.      loratadine (CLARITIN) 10 mg tablet loratadine 10 mg  "tablet      losartan (COZAAR) 100 MG tablet Take 100 mg by mouth once daily.      omeprazole (PRILOSEC) 20 MG capsule omeprazole 20 mg capsule,delayed release   TAKE 1 TABLET BY MOUTH EVERY DAY      oxybutynin (DITROPAN XL) 15 MG TR24 Take 1 tablet (15 mg total) by mouth once daily. 90 tablet 3    polyethylene glycol (GLYCOLAX) 17 gram PwPk Take 17 g by mouth once daily. 30 each 0    predniSONE (DELTASONE) 5 MG tablet Take 1 tablet (5 mg total) by mouth once daily. Take as directed with water on an empty stomach. 30 tablet 11    sildenafiL (VIAGRA) 100 MG tablet Take 100 mg by mouth daily as needed for Erectile Dysfunction.      venlafaxine (EFFEXOR XR) 75 MG 24 hr capsule Take 1 capsule (75 mg total) by mouth once daily. 30 capsule 2     Current Facility-Administered Medications   Medication Dose Route Frequency Provider Last Rate Last Admin    triptorelin pamoate SusR 11.25 mg  11.25 mg Intramuscular 1 time in Clinic/HOD Edward Edmond MD             Objective:   Objective      Physical Exam:   /85 (BP Location: Left arm, Patient Position: Sitting, BP Method: Medium (Automatic))   Pulse 94   Temp 98.4 °F (36.9 °C) (Oral)   Ht 5' 11" (1.803 m)   Wt 99.2 kg (218 lb 11.1 oz)   BMI 30.50 kg/m²       ECOG Performance status:   ECOG SCORE    1 - Restricted in strenuous activity-ambulatory and able to carry out work of a light nature                  Physical Exam  Constitutional:       General: He is not in acute distress.     Appearance: Normal appearance.   HENT:      Head: Normocephalic.      Mouth/Throat:      Mouth: Mucous membranes are dry.   Eyes:      General: No scleral icterus.     Extraocular Movements: Extraocular movements intact.      Conjunctiva/sclera: Conjunctivae normal.   Cardiovascular:      Rate and Rhythm: Normal rate and regular rhythm.      Heart sounds: No murmur heard.  Pulmonary:      Effort: Pulmonary effort is normal. No respiratory distress.      Breath sounds: Normal breath " sounds. No wheezing.   Abdominal:      General: There is no distension.      Palpations: Abdomen is soft.      Tenderness: There is no abdominal tenderness.   Musculoskeletal:         General: No swelling. Normal range of motion.      Cervical back: Normal range of motion.      Right lower leg: No edema.      Left lower leg: No edema.   Skin:     General: Skin is warm and dry.      Coloration: Skin is not jaundiced.      Findings: No rash.   Neurological:      General: No focal deficit present.      Mental Status: He is alert and oriented to person, place, and time.      Motor: No weakness.   Psychiatric:         Mood and Affect: Mood normal.         Behavior: Behavior normal.         Thought Content: Thought content normal.          Recent Labs:   Recent Results (from the past 48 hour(s))   CBC Oncology    Collection Time: 06/09/23  9:00 AM   Result Value Ref Range    WBC 8.31 3.90 - 12.70 K/uL    RBC 3.43 (L) 4.60 - 6.20 M/uL    Hemoglobin 9.9 (L) 14.0 - 18.0 g/dL    Hematocrit 33.4 (L) 40.0 - 54.0 %    MCV 97 82 - 98 fL    MCH 28.9 27.0 - 31.0 pg    MCHC 29.6 (L) 32.0 - 36.0 g/dL    RDW 14.7 (H) 11.5 - 14.5 %    Platelets 341 150 - 450 K/uL    MPV 9.9 9.2 - 12.9 fL    Gran # (ANC) 5.7 1.8 - 7.7 K/uL    Immature Grans (Abs) 0.04 0.00 - 0.04 K/uL   Comprehensive Metabolic Panel    Collection Time: 06/09/23  9:00 AM   Result Value Ref Range    Sodium 136 136 - 145 mmol/L    Potassium 5.0 3.5 - 5.1 mmol/L    Chloride 103 95 - 110 mmol/L    CO2 23 23 - 29 mmol/L    Glucose 368 (H) 70 - 110 mg/dL    BUN 21 (H) 6 - 20 mg/dL    Creatinine 1.4 0.5 - 1.4 mg/dL    Calcium 9.6 8.7 - 10.5 mg/dL    Total Protein 7.0 6.0 - 8.4 g/dL    Albumin 3.2 (L) 3.5 - 5.2 g/dL    Total Bilirubin 0.4 0.1 - 1.0 mg/dL    Alkaline Phosphatase 105 55 - 135 U/L    AST 16 10 - 40 U/L    ALT 20 10 - 44 U/L    Anion Gap 10 8 - 16 mmol/L    eGFR 58.3 (A) >60 mL/min/1.73 m^2   Prostate Specific Antigen, Diagnostic    Collection Time: 06/09/23  9:00  AM   Result Value Ref Range    PSA Diagnostic <0.01 0.00 - 4.00 ng/mL         Lab Results   Component Value Date    PSADIAG <0.01 06/09/2023    PSADIAG <0.01 05/12/2023    PSADIAG <0.01 05/01/2023    PSADIAG <0.01 04/14/2023    PSADIAG <0.01 03/30/2023    PSADIAG <0.01 03/16/2023    PSADIAG <0.01 03/01/2023    PSADIAG <0.01 02/01/2023    PSATOTAL <0.01 03/22/2023    PSATOTAL 0.02 12/19/2022    PSATOTAL 2.8 06/06/2022        Cardiovascular Screening:  The ASCVD Risk score (Niraj MCFADDEN, et al., 2019) failed to calculate for the following reasons:    The valid total cholesterol range is 130 to 320 mg/dL     Body mass index is 30.5 kg/m².     Lab Results   Component Value Date    CHOL 116 (L) 01/26/2021    LDLCALC 60.4 (L) 01/26/2021    HDL 35 (L) 01/26/2021    TRIG 103 01/26/2021    HGBA1C 7.0 (H) 05/12/2023           Bone Health     No results found for: [id number]     Results for orders placed during the hospital encounter of 03/14/23    DXA Bone Density with Vertebral Fracture    Impression  *Low bone mass (Osteopenia); FRAX calculations do not support treatment as osteoporosis.    RECOMMENDATIONS:  *Daily calcium intake 4992-5216 mg, dietary sources preferred; Vitamin D 5260-1039 IU daily.  *Weight bearing exercise and fall precautions.  *If dedicated imaging is negative for vertebral fracture, then no additional treatment is recommended at this time. If positive for fracture, would treat as osteoporosis.  *If patient smokes would recommend cessation.  *Repeat BMD in 2 years.    EXPLANATION OF RESULTS:  T-score compares these results to the average bone density of a 20-29 year-old of the same gender.    Z-score compares this result to the average bone density to people of the same age, gender, and race.    The amounts indicate the number of standard deviations above or below the mean.    * Osteoporosis is generally defined as having a T-score between less than or equal to -2.5.    * Low bone mass (osteopenia) is  generally defined as having a T-score between -1.0 and -2.5.    * The normal range is generally defined as having a T-score greater than or equal to -1.0.    * Calculated FRAX scores for fracture risk prediction may not be accurate in the setting of certain clinical factors such as pharmacologic therapy for osteoporosis, prior fragility fractures, high dose glucocorticoid use.      Electronically signed by: Tayla Amato MD  Date:    03/27/2023  Time:    07:46       Staging Imaging     Results for orders placed during the hospital encounter of 06/21/22    NM PET CT F 18 PYL PSMA, Midthigh to Vertex    Impression  In this patient with prostate malignancy status post prostatectomy, there are tracer-avid left para-aortic, presacral/left common iliac, and left external iliac chain nodes as described above, concerning for recurrent metastatic disease.    Additional findings as above.    This report was flagged in Epic as abnormal.    I, Narayan Harding MD, attest that I reviewed and interpreted the images.    Electronically signed by resident: Harshil Castaneda  Date:    06/21/2022  Time:    15:45    Electronically signed by: Narayan Harding  Date:    06/21/2022  Time:    17:34       Results for orders placed during the hospital encounter of 04/27/21    NM Bone Scan Whole Body    Impression  There is abnormal increased radiotracer activity identified involving the maxilla and multiple paranasal sinuses likely related to periodontal disease and sinus disease. Recommend clinical correlation and further evaluation with maxillofacial CT as warranted.      Electronically signed by: Anshul Ayon MD  Date:    04/27/2021  Time:    13:19      No results found for this or any previous visit.       Results for orders placed during the hospital encounter of 04/30/23    CT Abdomen Pelvis With Contrast    Impression  No acute abdominal or pelvic pathology CT of the abdomen and pelvis with contrast.    Mild fatty infiltration of the  liver.    Indeterminate adrenal lesions, which were not increased in size.    Indeterminate 11-12 mm left upper pole renal cystic lesion.  Findings may represent a complex cyst.  If warranted, consider follow-up renal ultrasound when clinically appropriate.    Questionably a mildly thickened urinary bladder wall.  Findings may be due to infection or lower under distension.    Colonic diverticulosis.    This report was flagged in Epic as abnormal.    The preliminary and final reports are near concordant.      Electronically signed by: Anna Proctor  Date:    04/30/2023  Time:    19:04       I have personally reviewed the above imaging.     Path:   Reviewed pathology as documented above.       Diagnoses:     1. Prostate cancer metastatic to intrapelvic lymph node    2. Androgen deprivation therapy    3. Long term current use of therapeutic drug    4. Gastroparesis due to DM    5. Diabetic peripheral neuropathy    6. Hypertension, unspecified type    7. Mixed hyperlipidemia    8. Type 2 diabetes mellitus with diabetic polyneuropathy, without long-term current use of insulin    9. Gastroesophageal reflux disease, unspecified whether esophagitis present          Assessment and Plan:     1. Prostate cancer metastatic to intrapelvic lymph node  Overview:  Intermediate risk Jagdeep 4+3 disease sp R1 prostatectomy 2015 with adjuvant RT presumably to prostate bed. Now with PSA recurrence and PSMA imaging with regional maren metastases and limited intraabdominal maren mets.Started intensified hormonal therapy with ADT + abiraterone 02/2023. Plan for further consideration of RT.    Assessment & Plan:  Tolerating hormonal therapy well aside from hot flashes. PSA remains undetectable. Could not tolerate venlafaxien 75mg daily    - Continue /5 + ADT per below  - Referral placed to radiation oncology  - Reduce venlafaxine to 37.5mg    Orders:  -     Ambulatory referral/consult to Radiation Oncology; Future; Expected date:  06/16/2023  -     Ambulatory referral/consult to Hematology/Oncology/Nutrition; Future; Expected date: 06/16/2023    2. Androgen deprivation therapy  Assessment & Plan:  Androgen deprivation therapy  Start date: 10/22  Last dose: Triptorelin 11.25mg 3/29/23  Next dose: 6/21/23 - gets with Dr. Edmond    Novel Androgen signaling inhibitors:   Abiraterone + Prednisone 250/5  Start date: 02/2023  - Well tolerated; repeat labs in 6 weeks; MD visit in 12 weeks    Cardiovascular health:  Primary care physician: Kosta Roberts MD   ASCVD risk score: NA  High blood pressure: Yes  A1c: 7.0 5/2023 - has follow up with Dr. Roberts  LDL 60.4 - 01/2021  Antilipid: Atorvastatin  Antiplatelet therapy:  Aspirin  BMI: 30.5 - referred to nutrition    Bone health  DEXA scan: 3/27/23 - low risk osteopenia; repeat 03/2025  Vitamin D: 1000 IU daily  Calcium: Calcium carbonate bid    Genomic testing:  Germline genetic testing: Referral placed; needs scheduling  Somatic tumor genotyping: None  ctDNA genotyping: InbilinpVoalte xT 5/21/23: TP53 mutation        Orders:  -     Ambulatory referral/consult to Hematology/Oncology/Nutrition; Future; Expected date: 06/16/2023    3. Long term current use of therapeutic drug  -     Calcitriol; Future; Expected date: 06/09/2023  -     Lipid Panel; Future; Expected date: 06/09/2023    4. Gastroparesis due to DM  Overview:  Episodes of intractable N/V and abdominal pain every few months thought due to gastroparesis +/- cannibas hyperemesis.    Assessment & Plan:  - Symptoms improved today  - Has recently stopped marijuana      5. Diabetic peripheral neuropathy  Overview:  Home medications include gabapentin and amitryptiline      6. Hypertension, unspecified type  Overview:  Home medications include carvedilol, amlodipine, and losartan.     Assessment & Plan:  - Well controlled today      7. Mixed hyperlipidemia  Assessment & Plan:  Home medications include atorvastatin      8. Type 2 diabetes mellitus with  diabetic polyneuropathy, without long-term current use of insulin  Overview:  Stopped metformin 05/2023.. No insulin use. Complicated by peripheral neuropathy and potentially gastroparesis.    Assessment & Plan:  - Has follow up with Dr. Armando Grimm. Gastroesophageal reflux disease, unspecified whether esophagitis present  Overview:  Home medications include omeprazole                 Follow up:   Route Chart for Scheduling    Med Onc Chart Routing      Follow up with physician 3 months.   Follow up with DONAVAN    Infusion scheduling note    Injection scheduling note    Labs CBC, CMP, other and PSA   Scheduling:  Preferred lab:  Lab interval: every 6 weeks  starting 7/21/23; please get fasting lipds that visit as well   Imaging    Pharmacy appointment    Other referrals             Treatment Plan Information   OP ABIRATERONE DAILY PREDNISONE BID   Edward Zuleta MD   Upcoming Treatment Dates - OP ABIRATERONE DAILY PREDNISONE BID    No upcoming days in selected categories.         The above information has been reviewed with the patient and all questions have been answered to their apparent satisfaction.  They understand that they can call the clinic with any questions.

## 2023-06-13 ENCOUNTER — SPECIALTY PHARMACY (OUTPATIENT)
Dept: PHARMACY | Facility: CLINIC | Age: 58
End: 2023-06-13
Payer: MEDICAID

## 2023-06-13 NOTE — TELEPHONE ENCOUNTER
Specialty Pharmacy - Refill Coordination    Specialty Medication Orders Linked to Encounter      Flowsheet Row Most Recent Value   Medication #1  (Order#[id number], Rx#)   Medication #2 abiraterone (ZYTIGA) 250 mg Tab (Order#[id number], Rx#[id number])            Refill Questions - Documented Responses      Flowsheet Row Most Recent Value   Patient Availability and HIPAA Verification    Does patient want to proceed with activity? Yes   HIPAA/medical authority confirmed? Yes   Relationship to patient of person spoken to? Self   Refill Screening Questions    Changes to allergies? No   Changes to medications? Yes  [Jardiance and Glipizide added to profile. Metformin d/c]   New conditions since last clinic visit? No   Unplanned office visit, urgent care, ED, or hospital admission in the last 4 weeks? Yes  [About a month ago for n/v. It's been resolved. Zofran helped]   How does patient/caregiver feel medication is working? Good   Financial problems or insurance changes? No   How many doses of your specialty medications were missed in the last 4 weeks? 0   Would patient like to speak to a pharmacist? No   When does the patient need to receive the medication? 06/17/23   Refill Delivery Questions    How will the patient receive the medication? [facility]   When does the patient need to receive the medication? 06/17/23   Shipping Address Home   Address in [facility] confirmed and updated if neccessary? Yes   Expected Copay ($) 0   Is the patient able to afford the medication copay? Yes   Payment Method zero copay   Days supply of Refill 30   Supplies needed? No supplies needed   Refill activity completed? Yes   Refill activity plan Refill scheduled   Shipment/Pickup Date: 06/14/23            Current Outpatient Medications   Medication Sig    abiraterone (ZYTIGA) 250 mg Tab Take 1 tablet (250 mg total) by mouth once daily Take as directed with a low fat meal.    acetaminophen (TYLENOL) 500 MG tablet Take 1,500 mg by mouth  daily as needed for Pain.    amitriptyline (ELAVIL) 25 MG tablet Take 25 mg by mouth every evening.    amLODIPine (NORVASC) 10 MG tablet Take 10 mg by mouth every evening.    aspirin (ECOTRIN) 81 MG EC tablet Aspir-81 mg tablet,delayed release   Take 1 tablet every day by oral route in the morning for 30 days.    atorvastatin (LIPITOR) 20 MG tablet Take 20 mg by mouth once daily.    blood sugar diagnostic (ONETOUCH VERIO TEST STRIPS MISC) OneTouch Verio test strips    calcium carbonate (OS-CALOS) 500 mg calcium (1,250 mg) tablet Take 2 tablets (1,000 mg total) by mouth once daily.    carvediloL (COREG) 25 MG tablet Take 1 tablet (25 mg total) by mouth 2 (two) times daily.    cholecalciferol, vitamin D3, (VITAMIN D3) 25 mcg (1,000 unit) capsule Take 1 capsule (1,000 Units total) by mouth once daily.    fluticasone propionate (FLONASE) 50 mcg/actuation nasal spray fluticasone propionate 50 mcg/actuation nasal spray,suspension   SPRAY 2 SPRAYS EVERY DAY BY INTRANASAL ROUTE AS NEEDED.    gabapentin (NEURONTIN) 400 MG capsule Take 400 mg by mouth 3 (three) times daily.    loratadine (CLARITIN) 10 mg tablet loratadine 10 mg tablet    losartan (COZAAR) 100 MG tablet Take 100 mg by mouth once daily.    omeprazole (PRILOSEC) 20 MG capsule omeprazole 20 mg capsule,delayed release   TAKE 1 TABLET BY MOUTH EVERY DAY    oxybutynin (DITROPAN XL) 15 MG TR24 Take 1 tablet (15 mg total) by mouth once daily.    polyethylene glycol (GLYCOLAX) 17 gram PwPk Take 17 g by mouth once daily.    predniSONE (DELTASONE) 5 MG tablet Take 1 tablet (5 mg total) by mouth once daily. Take as directed with water on an empty stomach.    sildenafiL (VIAGRA) 100 MG tablet Take 100 mg by mouth daily as needed for Erectile Dysfunction.    venlafaxine (EFFEXOR XR) 75 MG 24 hr capsule Take 1 capsule (75 mg total) by mouth once daily.   Last reviewed on 3/29/2023 10:02 AM by Edward Edmond MD    Review of patient's allergies indicates:   Allergen Reactions     Lisinopril Shortness Of Breath    Last reviewed on  6/9/2023 10:00 AM by Jana Maki      Tasks added this encounter   No tasks added.   Tasks due within next 3 months   8/2/2023 - Clinical Assessment (6 month recurrence)     Dougie Padilla, PharmD  [facility]  [address]  [facility] [address]  Phone: [phone]  Fax: [phone]

## 2023-06-13 NOTE — TELEPHONE ENCOUNTER
Outgoing call regarding Zytiga and Prednisone refill. PT stated he has 4 days on hand, and the Zytiga was causing his Sugar to be elevated, pt stated the provider has put him on Jardence, Glipize, and couldn't remember the 3rd pt stated he was not home, Transferred to [facility]. Pt stated he has follow up on 6/20/23.

## 2023-06-16 ENCOUNTER — PATIENT MESSAGE (OUTPATIENT)
Dept: PODIATRY | Facility: CLINIC | Age: 58
End: 2023-06-16
Payer: MEDICAID

## 2023-06-22 ENCOUNTER — PATIENT MESSAGE (OUTPATIENT)
Dept: UROLOGY | Facility: CLINIC | Age: 58
End: 2023-06-22
Payer: MEDICAID

## 2023-06-28 ENCOUNTER — OFFICE VISIT (OUTPATIENT)
Dept: UROLOGY | Facility: CLINIC | Age: 58
End: 2023-06-28
Payer: MEDICAID

## 2023-06-28 VITALS
HEIGHT: 71 IN | DIASTOLIC BLOOD PRESSURE: 84 MMHG | BODY MASS INDEX: 30.27 KG/M2 | HEART RATE: 92 BPM | WEIGHT: 216.19 LBS | SYSTOLIC BLOOD PRESSURE: 133 MMHG

## 2023-06-28 DIAGNOSIS — N32.81 OVERACTIVE BLADDER: ICD-10-CM

## 2023-06-28 DIAGNOSIS — C77.5 PROSTATE CANCER METASTATIC TO INTRAPELVIC LYMPH NODE: Primary | ICD-10-CM

## 2023-06-28 DIAGNOSIS — R35.0 URINARY FREQUENCY: ICD-10-CM

## 2023-06-28 DIAGNOSIS — Z80.42 FAMILY HISTORY OF PROSTATE CANCER IN FATHER: ICD-10-CM

## 2023-06-28 DIAGNOSIS — C61 PROSTATE CANCER METASTATIC TO INTRAPELVIC LYMPH NODE: Primary | ICD-10-CM

## 2023-06-28 DIAGNOSIS — R39.15 URINARY URGENCY: ICD-10-CM

## 2023-06-28 PROCEDURE — 1160F RVW MEDS BY RX/DR IN RCRD: CPT | Mod: CPTII,,, | Performed by: UROLOGY

## 2023-06-28 PROCEDURE — 4010F ACE/ARB THERAPY RXD/TAKEN: CPT | Mod: CPTII,,, | Performed by: UROLOGY

## 2023-06-28 PROCEDURE — 3051F HG A1C>EQUAL 7.0%<8.0%: CPT | Mod: CPTII,,, | Performed by: UROLOGY

## 2023-06-28 PROCEDURE — 3051F PR MOST RECENT HEMOGLOBIN A1C LEVEL 7.0 - < 8.0%: ICD-10-PCS | Mod: CPTII,,, | Performed by: UROLOGY

## 2023-06-28 PROCEDURE — 4010F PR ACE/ARB THEARPY RXD/TAKEN: ICD-10-PCS | Mod: CPTII,,, | Performed by: UROLOGY

## 2023-06-28 PROCEDURE — 99215 PR OFFICE/OUTPT VISIT, EST, LEVL V, 40-54 MIN: ICD-10-PCS | Mod: S$PBB,,, | Performed by: UROLOGY

## 2023-06-28 PROCEDURE — 99999 PR PBB SHADOW E&M-EST. PATIENT-LVL V: CPT | Mod: PBBFAC,,, | Performed by: UROLOGY

## 2023-06-28 PROCEDURE — 3008F PR BODY MASS INDEX (BMI) DOCUMENTED: ICD-10-PCS | Mod: CPTII,,, | Performed by: UROLOGY

## 2023-06-28 PROCEDURE — 3008F BODY MASS INDEX DOCD: CPT | Mod: CPTII,,, | Performed by: UROLOGY

## 2023-06-28 PROCEDURE — 3079F DIAST BP 80-89 MM HG: CPT | Mod: CPTII,,, | Performed by: UROLOGY

## 2023-06-28 PROCEDURE — 99215 OFFICE O/P EST HI 40 MIN: CPT | Mod: PBBFAC,25,PO | Performed by: UROLOGY

## 2023-06-28 PROCEDURE — 96402 CHEMO HORMON ANTINEOPL SQ/IM: CPT | Mod: PBBFAC,PO

## 2023-06-28 PROCEDURE — 3075F PR MOST RECENT SYSTOLIC BLOOD PRESS GE 130-139MM HG: ICD-10-PCS | Mod: CPTII,,, | Performed by: UROLOGY

## 2023-06-28 PROCEDURE — 3079F PR MOST RECENT DIASTOLIC BLOOD PRESSURE 80-89 MM HG: ICD-10-PCS | Mod: CPTII,,, | Performed by: UROLOGY

## 2023-06-28 PROCEDURE — 3075F SYST BP GE 130 - 139MM HG: CPT | Mod: CPTII,,, | Performed by: UROLOGY

## 2023-06-28 PROCEDURE — 99999 PR PBB SHADOW E&M-EST. PATIENT-LVL V: ICD-10-PCS | Mod: PBBFAC,,, | Performed by: UROLOGY

## 2023-06-28 PROCEDURE — 1159F PR MEDICATION LIST DOCUMENTED IN MEDICAL RECORD: ICD-10-PCS | Mod: CPTII,,, | Performed by: UROLOGY

## 2023-06-28 PROCEDURE — 99215 OFFICE O/P EST HI 40 MIN: CPT | Mod: S$PBB,,, | Performed by: UROLOGY

## 2023-06-28 PROCEDURE — 1159F MED LIST DOCD IN RCRD: CPT | Mod: CPTII,,, | Performed by: UROLOGY

## 2023-06-28 PROCEDURE — 1160F PR REVIEW ALL MEDS BY PRESCRIBER/CLIN PHARMACIST DOCUMENTED: ICD-10-PCS | Mod: CPTII,,, | Performed by: UROLOGY

## 2023-06-28 RX ORDER — METFORMIN HYDROCHLORIDE 1000 MG/1
TABLET ORAL
COMMUNITY

## 2023-06-28 RX ORDER — PREGABALIN 50 MG/1
CAPSULE ORAL
COMMUNITY

## 2023-06-28 RX ORDER — LANOLIN ALCOHOL/MO/W.PET/CERES
CREAM (GRAM) TOPICAL
COMMUNITY
End: 2023-09-08

## 2023-06-28 RX ORDER — GLIPIZIDE 10 MG/1
TABLET, FILM COATED, EXTENDED RELEASE ORAL
COMMUNITY
End: 2023-09-08 | Stop reason: ALTCHOICE

## 2023-06-28 RX ORDER — ATORVASTATIN CALCIUM 40 MG/1
TABLET, FILM COATED ORAL
COMMUNITY

## 2023-06-28 RX ORDER — ONDANSETRON 4 MG/1
TABLET, ORALLY DISINTEGRATING ORAL
COMMUNITY
End: 2024-01-29 | Stop reason: SDUPTHER

## 2023-06-28 RX ORDER — INSULIN GLARGINE 100 [IU]/ML
INJECTION, SOLUTION SUBCUTANEOUS
COMMUNITY

## 2023-06-28 RX ORDER — POLYETHYLENE GLYCOL 3350 17 G/17G
POWDER, FOR SOLUTION ORAL
COMMUNITY
Start: 2023-05-01 | End: 2023-09-08 | Stop reason: SDUPTHER

## 2023-06-28 RX ORDER — VENLAFAXINE HYDROCHLORIDE 37.5 MG/1
CAPSULE, EXTENDED RELEASE ORAL
COMMUNITY
End: 2023-09-08

## 2023-06-28 RX ADMIN — TRIPTORELIN PAMOATE 11.25 MG: KIT at 11:06

## 2023-06-28 NOTE — PROGRESS NOTES
Subjective:      Patient ID: Mauro Benitez is a 57 y.o. male.    Chief Complaint: prostate cancer - trelstar    Mr. Wade is a 57-year-old gentleman who is status post radical retropubic prostatectomy in 2015 at [facility] in [place].  The patient had moved to [place] and was seeing Dr. Roberts for medical follow-up and was found have an elevated PSA.  He was referred for evaluation and nurse practitioner Richie ordered a PSMA Pylarify 18 scan which showed positive lymph nodes in the pelvis and periaortic area.  The patient was started on Casodex 50 mg daily for 2 weeks.  The patient then underwent Trelstar injection.  He is responded real well and is here for 12 week follow-up with injection.  His PSA dropped from 2.8 t less than 0.01 over  the last year.  The patient is having symptoms of hot flashes which are easing up but no other complaints.  The patient was since sent for consultation with hematology oncologist Dr. Zuleta.  The patient has since been put on steroids and abiraterone.  His PSA has dropped to less than 0.01.  The patient has some symptoms of nausea which is possibly due to diabetic gastroparesis.  The patient has divided his morning dose of medicine into 2 separate dosage 1 he takes prior to meal 1 2 hours after eating and this way he has been able to keep both of his doses down.  The patient's weight is fluctuating with some loss and some gain after starting to use the prednisone.     Follow-up  Chronicity: Patient here for follow-up for treatment of prostate cancer.  The patient has been found have significant dry mouth with the use of oxybutynin.  I have been asked to change this to Myrbetriq. The current episode started more than 1 year ago. The problem occurs constantly. The problem has been gradually improving. Pertinent negatives include no abdominal pain, anorexia, arthralgias, change in bowel habit, chest pain, chills, congestion, coughing, diaphoresis, fatigue,  fever, headaches, joint swelling, myalgias, nausea, neck pain, numbness, rash, sore throat, swollen glands, urinary symptoms, vertigo, visual change, vomiting or weakness.   Review of Systems   Constitutional:  Negative for activity change, appetite change, chills, diaphoresis, fatigue, fever and unexpected weight change.   HENT:  Negative for congestion, hearing loss, sinus pressure, sore throat and trouble swallowing.    Eyes:  Negative for photophobia, pain, discharge and visual disturbance.   Respiratory:  Negative for apnea, cough and shortness of breath.    Cardiovascular:  Negative for chest pain, palpitations and leg swelling.   Gastrointestinal:  Negative for abdominal distention, abdominal pain, anal bleeding, anorexia, blood in stool, change in bowel habit, constipation, diarrhea, nausea, rectal pain and vomiting.   Endocrine: Negative for cold intolerance, heat intolerance, polydipsia, polyphagia and polyuria.   Genitourinary:  Negative for decreased urine volume, difficulty urinating, dysuria, enuresis, flank pain, frequency, genital sores, hematuria, penile discharge, penile pain, penile swelling, scrotal swelling, testicular pain and urgency.   Musculoskeletal:  Negative for arthralgias, back pain, joint swelling, myalgias and neck pain.   Skin:  Negative for color change, pallor, rash and wound.   Allergic/Immunologic: Negative for environmental allergies, food allergies and immunocompromised state.   Neurological:  Negative for dizziness, vertigo, seizures, weakness, numbness and headaches.   Hematological:  Negative for adenopathy. Does not bruise/bleed easily.   Psychiatric/Behavioral: Negative.      Objective:     Physical Exam  Constitutional:       Appearance: Normal appearance.   HENT:      Head: Normocephalic and atraumatic.      Right Ear: External ear normal.      Left Ear: External ear normal.      Nose: Nose normal.      Mouth/Throat:      Pharynx: No oropharyngeal exudate or posterior  oropharyngeal erythema.   Eyes:      General: No scleral icterus.        Right eye: No discharge.         Left eye: No discharge.      Extraocular Movements: Extraocular movements intact.      Pupils: Pupils are equal, round, and reactive to light.   Cardiovascular:      Rate and Rhythm: Normal rate and regular rhythm.      Heart sounds: No murmur heard.    No friction rub. No gallop.   Pulmonary:      Effort: Pulmonary effort is normal. No respiratory distress.      Breath sounds: Normal breath sounds. No stridor. No wheezing, rhonchi or rales.   Chest:      Chest wall: No tenderness.   Abdominal:      General: Abdomen is flat. Bowel sounds are normal. There is no distension.      Palpations: There is no mass.      Tenderness: There is no abdominal tenderness. There is no right CVA tenderness, left CVA tenderness, guarding or rebound.      Hernia: No hernia is present.   Genitourinary:     Penis: Normal.       Testes: Normal.   Musculoskeletal:      Cervical back: Normal range of motion.      Right lower leg: No edema.      Left lower leg: No edema.   Skin:     Capillary Refill: Capillary refill takes less than 2 seconds.   Neurological:      Mental Status: He is alert.      Gait: Gait normal.      Deep Tendon Reflexes: Reflexes normal.   Psychiatric:         Mood and Affect: Mood normal.         Behavior: Behavior normal.         Thought Content: Thought content normal.         Judgment: Judgment normal.      Assessment:      1. Overactive bladder    2. Prostate cancer metastatic to intrapelvic lymph node    3. Family history of prostate cancer in father    4. Urinary urgency    5. Urinary frequency      Plan:     Patient Instructions   Trelstar injection today  Continue current medication except for oxybutynin.  Switch this to mirabegron due to severe side effects of significant dry mouth.  Patient is unable to take that class of medication and therefore will need to be on mirabegron.  Follow-up in 3 months PSA  free and total testosterone Trelstar injection.

## 2023-06-28 NOTE — PATIENT INSTRUCTIONS
Trelstar injection today  Continue current medication except for oxybutynin.  Switch this to mirabegron due to severe side effects of significant dry mouth.  Patient is unable to take that class of medication and therefore will need to be on mirabegron.  Follow-up in 3 months PSA free and total testosterone Trelstar injection.

## 2023-07-07 ENCOUNTER — PATIENT MESSAGE (OUTPATIENT)
Dept: PHARMACY | Facility: CLINIC | Age: 58
End: 2023-07-07
Payer: MEDICAID

## 2023-07-11 ENCOUNTER — SPECIALTY PHARMACY (OUTPATIENT)
Dept: PHARMACY | Facility: CLINIC | Age: 58
End: 2023-07-11
Payer: MEDICAID

## 2023-07-11 NOTE — TELEPHONE ENCOUNTER
Specialty Pharmacy - Refill Coordination    Specialty Medication Orders Linked to Encounter      Flowsheet Row Most Recent Value   Medication #1 predniSONE (DELTASONE) 5 MG tablet (Order#[id number], Rx#[id number])   Medication #2 abiraterone (ZYTIGA) 250 mg Tab (Order#[id number], Rx#[id number])            Refill Questions - Documented Responses      Flowsheet Row Most Recent Value   Patient Availability and HIPAA Verification    Does patient want to proceed with activity? Yes   HIPAA/medical authority confirmed? Yes   Relationship to patient of person spoken to? Self   Refill Screening Questions    Changes to allergies? No   Changes to medications? No   New conditions since last clinic visit? No   Unplanned office visit, urgent care, ED, or hospital admission in the last 4 weeks? No   How does patient/caregiver feel medication is working? Good   Financial problems or insurance changes? No   How many doses of your specialty medications were missed in the last 4 weeks? 0   Would patient like to speak to a pharmacist? No   When does the patient need to receive the medication? 07/16/23   Refill Delivery Questions    How will the patient receive the medication? MEDRx   When does the patient need to receive the medication? 07/16/23   Shipping Address Home   Address in [facility] confirmed and updated if neccessary? Yes   Expected Copay ($) 0   Is the patient able to afford the medication copay? Yes   Payment Method zero copay   Days supply of Refill 30   Supplies needed? No supplies needed   Refill activity completed? Yes   Refill activity plan Refill scheduled   Shipment/Pickup Date: 07/12/23            Current Outpatient Medications   Medication Sig    abiraterone (ZYTIGA) 250 mg Tab Take 1 tablet (250 mg total) by mouth once daily Take as directed with a low fat meal.    acetaminophen (TYLENOL) 500 MG tablet Take 1,500 mg by mouth daily as needed for Pain.    amitriptyline (ELAVIL) 25 MG tablet Take 25 mg by  mouth every evening.    amLODIPine (NORVASC) 10 MG tablet Take 10 mg by mouth every evening.    aspirin (ECOTRIN) 81 MG EC tablet Aspir-81 mg tablet,delayed release   Take 1 tablet every day by oral route in the morning for 30 days.    atorvastatin (LIPITOR) 20 MG tablet Take 20 mg by mouth once daily.    atorvastatin (LIPITOR) 40 MG tablet atorvastatin 40 mg tablet   TAKE 1 TABLET BY MOUTH EVERY DAY AT BEDTIME    blood sugar diagnostic (ONETOUCH VERIO TEST STRIPS MISC) OneTouch Verio test strips    blood sugar diagnostic (ONETOUCH VERIO TEST STRIPS MISC) OneTouch Verio test strips   USE 1 TEST STRIP TO CHECK BLOOD SUGAR TWICE DAILY    blood sugar diagnostic Strp Accu-Chek Odalis Plus test strips   Take 1 strip twice a day by miscell. route.    calcium carbonate (OS-CALOS) 500 mg calcium (1,250 mg) tablet Take 2 tablets (1,000 mg total) by mouth once daily.    carvediloL (COREG) 25 MG tablet Take 1 tablet (25 mg total) by mouth 2 (two) times daily.    cholecalciferol, vitamin D3, (VITAMIN D3) 25 mcg (1,000 unit) capsule Take 1 capsule (1,000 Units total) by mouth once daily.    fluticasone propionate (FLONASE) 50 mcg/actuation nasal spray fluticasone propionate 50 mcg/actuation nasal spray,suspension   SPRAY 2 SPRAYS EVERY DAY BY INTRANASAL ROUTE AS NEEDED.    gabapentin (NEURONTIN) 400 MG capsule Take 400 mg by mouth 3 (three) times daily.    glipiZIDE (GLUCOTROL) 10 MG TR24 glipizide ER 10 mg tablet, extended release 24 hr   TAKE 1 TABLET BY MOUTH EVERY DAY    insulin glargine 100 units/mL SubQ pen Lantus Solostar U-100 Insulin 100 unit/mL (3 mL) subcutaneous pen   Inject 10 units every day by subcutaneous route in the evening.    loratadine (CLARITIN) 10 mg tablet loratadine 10 mg tablet    losartan (COZAAR) 100 MG tablet Take 100 mg by mouth once daily.    magnesium oxide (MAG-OX) 400 mg (241.3 mg magnesium) tablet magnesium oxide 400 mg (241.3 mg magnesium) tablet   TAKE 1 TABLET BY MOUTH EVERY DAY    metFORMIN  (GLUCOPHAGE) 1000 MG tablet metformin 1,000 mg tablet   TAKE 1 TABLET BY MOUTH TWICE DAILY    mirabegron (MYRBETRIQ) 25 mg Tb24 ER tablet Take 1 tablet (25 mg total) by mouth once daily.    omeprazole (PRILOSEC) 20 MG capsule omeprazole 20 mg capsule,delayed release   TAKE 1 TABLET BY MOUTH EVERY DAY    ondansetron (ZOFRAN-ODT) 4 MG TbDL ondansetron 4 mg disintegrating tablet   DISSOLVE 1 TABLET ON THE TONGUE THREE TIMES DAILY FOR 14 DAYS AS NEEDED    polyethylene glycol (GLYCOLAX) 17 gram PwPk Take 17 g by mouth once daily.    polyethylene glycol (GLYCOLAX) 17 gram/dose powder Take by mouth.    predniSONE (DELTASONE) 5 MG tablet Take 1 tablet (5 mg total) by mouth once daily. Take as directed with water on an empty stomach.    pregabalin (LYRICA) 50 MG capsule pregabalin 50 mg capsule   Take 1 capsule 3 times a day by oral route.   Taper off of gabapentin first    sildenafiL (VIAGRA) 100 MG tablet Take 100 mg by mouth daily as needed for Erectile Dysfunction.    SITagliptin phosphate (JANUVIA) 100 MG Tab Januvia 100 mg tablet    venlafaxine (EFFEXOR XR) 75 MG 24 hr capsule Take 1 capsule (75 mg total) by mouth once daily.    venlafaxine (EFFEXOR-XR) 37.5 MG 24 hr capsule venlafaxine ER 37.5 mg capsule,extended release 24 hr   Take 1 capsule every day by oral route.   Last reviewed on 6/28/2023 11:34 AM by Edward Edmond MD    Review of patient's allergies indicates:   Allergen Reactions    Lisinopril Shortness Of Breath    Last reviewed on  6/28/2023 11:34 AM by Edward Edmond      Tasks added this encounter   No tasks added.   Tasks due within next 3 months   8/2/2023 - Clinical Assessment (6 month recurrence)  7/10/2023 - Refill Coordination Outreach (1 time occurrence)     [facility]  [address] [place], [place] [address]  [facility] [address]  Phone: [phone]  Fax: [phone]

## 2023-07-12 ENCOUNTER — TELEPHONE (OUTPATIENT)
Dept: RADIATION ONCOLOGY | Facility: CLINIC | Age: 58
End: 2023-07-12
Payer: MEDICAID

## 2023-07-12 NOTE — TELEPHONE ENCOUNTER
I called to schedule cons, no answer. Unable to leave a message because the voicemail box was full.

## 2023-07-21 ENCOUNTER — TELEPHONE (OUTPATIENT)
Dept: HEMATOLOGY/ONCOLOGY | Facility: CLINIC | Age: 58
End: 2023-07-21
Payer: MEDICAID

## 2023-07-21 ENCOUNTER — LAB VISIT (OUTPATIENT)
Dept: LAB | Facility: HOSPITAL | Age: 58
End: 2023-07-21
Attending: HOSPITALIST
Payer: MEDICAID

## 2023-07-21 DIAGNOSIS — C61 PROSTATE CANCER: ICD-10-CM

## 2023-07-21 DIAGNOSIS — Z79.899 LONG TERM CURRENT USE OF THERAPEUTIC DRUG: ICD-10-CM

## 2023-07-21 LAB
ALBUMIN SERPL BCP-MCNC: 3.6 G/DL (ref 3.5–5.2)
ALP SERPL-CCNC: 107 U/L (ref 55–135)
ALT SERPL W/O P-5'-P-CCNC: 24 U/L (ref 10–44)
ANION GAP SERPL CALC-SCNC: 8 MMOL/L (ref 8–16)
AST SERPL-CCNC: 18 U/L (ref 10–40)
BILIRUB SERPL-MCNC: 0.7 MG/DL (ref 0.1–1)
BUN SERPL-MCNC: 27 MG/DL (ref 6–20)
CALCIUM SERPL-MCNC: 9.8 MG/DL (ref 8.7–10.5)
CHLORIDE SERPL-SCNC: 103 MMOL/L (ref 95–110)
CHOLEST SERPL-MCNC: 158 MG/DL (ref 120–199)
CHOLEST/HDLC SERPL: 3.1 {RATIO} (ref 2–5)
CO2 SERPL-SCNC: 24 MMOL/L (ref 23–29)
COMPLEXED PSA SERPL-MCNC: <0.01 NG/ML (ref 0–4)
CREAT SERPL-MCNC: 1.6 MG/DL (ref 0.5–1.4)
ERYTHROCYTE [DISTWIDTH] IN BLOOD BY AUTOMATED COUNT: 15.3 % (ref 11.5–14.5)
EST. GFR  (NO RACE VARIABLE): 49.6 ML/MIN/1.73 M^2
GLUCOSE SERPL-MCNC: 211 MG/DL (ref 70–110)
HCT VFR BLD AUTO: 34 % (ref 40–54)
HDLC SERPL-MCNC: 51 MG/DL (ref 40–75)
HDLC SERPL: 32.3 % (ref 20–50)
HGB BLD-MCNC: 10.2 G/DL (ref 14–18)
IMM GRANULOCYTES # BLD AUTO: 0.04 K/UL (ref 0–0.04)
LDLC SERPL CALC-MCNC: 90.8 MG/DL (ref 63–159)
MCH RBC QN AUTO: 28.3 PG (ref 27–31)
MCHC RBC AUTO-ENTMCNC: 30 G/DL (ref 32–36)
MCV RBC AUTO: 94 FL (ref 82–98)
NEUTROPHILS # BLD AUTO: 6.5 K/UL (ref 1.8–7.7)
NONHDLC SERPL-MCNC: 107 MG/DL
PLATELET # BLD AUTO: 301 K/UL (ref 150–450)
PMV BLD AUTO: 9.9 FL (ref 9.2–12.9)
POTASSIUM SERPL-SCNC: 5.9 MMOL/L (ref 3.5–5.1)
PROT SERPL-MCNC: 7.3 G/DL (ref 6–8.4)
RBC # BLD AUTO: 3.6 M/UL (ref 4.6–6.2)
SODIUM SERPL-SCNC: 135 MMOL/L (ref 136–145)
TRIGL SERPL-MCNC: 81 MG/DL (ref 30–150)
WBC # BLD AUTO: 8.45 K/UL (ref 3.9–12.7)

## 2023-07-21 PROCEDURE — 85027 COMPLETE CBC AUTOMATED: CPT | Performed by: HOSPITALIST

## 2023-07-21 PROCEDURE — 84153 ASSAY OF PSA TOTAL: CPT | Performed by: HOSPITALIST

## 2023-07-21 PROCEDURE — 80053 COMPREHEN METABOLIC PANEL: CPT | Performed by: HOSPITALIST

## 2023-07-21 PROCEDURE — 36415 COLL VENOUS BLD VENIPUNCTURE: CPT | Performed by: HOSPITALIST

## 2023-07-21 PROCEDURE — 80061 LIPID PANEL: CPT | Performed by: HOSPITALIST

## 2023-07-21 NOTE — PROGRESS NOTES
Yuliana - can you call him and reschedule CMP on Monday to evaluate his eleavtated Cr and K? Also check that he isn't dehydrated or feeling sick

## 2023-07-21 NOTE — TELEPHONE ENCOUNTER
----- Message from Edward Zuleta MD sent at 7/21/2023  1:09 PM CDT -----  Yuliana - can you call him and reschedule CMP on Monday to evaluate his eleavtated Cr and K? Also check that he isn't dehydrated or feeling sick

## 2023-07-26 ENCOUNTER — TELEPHONE (OUTPATIENT)
Dept: HEMATOLOGY/ONCOLOGY | Facility: CLINIC | Age: 58
End: 2023-07-26
Payer: MEDICAID

## 2023-07-26 NOTE — TELEPHONE ENCOUNTER
----- Message from She Arrington sent at 7/26/2023  3:47 PM CDT -----  Regarding: orders  Contact: Pt [phone]            Name of Caller:  Mauro     Contact Preference: [phone]    Nature of Call: Patient called to request lab orders states he received call telling him he needed to retest

## 2023-07-27 ENCOUNTER — LAB VISIT (OUTPATIENT)
Dept: LAB | Facility: HOSPITAL | Age: 58
End: 2023-07-27
Attending: HOSPITALIST
Payer: MEDICAID

## 2023-07-27 DIAGNOSIS — C61 PROSTATE CANCER: ICD-10-CM

## 2023-07-27 LAB
ALBUMIN SERPL BCP-MCNC: 3.6 G/DL (ref 3.5–5.2)
ALP SERPL-CCNC: 99 U/L (ref 55–135)
ALT SERPL W/O P-5'-P-CCNC: 19 U/L (ref 10–44)
ANION GAP SERPL CALC-SCNC: 13 MMOL/L (ref 8–16)
AST SERPL-CCNC: 18 U/L (ref 10–40)
BILIRUB SERPL-MCNC: 0.7 MG/DL (ref 0.1–1)
BUN SERPL-MCNC: 27 MG/DL (ref 6–20)
CALCIUM SERPL-MCNC: 9.6 MG/DL (ref 8.7–10.5)
CHLORIDE SERPL-SCNC: 106 MMOL/L (ref 95–110)
CO2 SERPL-SCNC: 20 MMOL/L (ref 23–29)
CREAT SERPL-MCNC: 1.3 MG/DL (ref 0.5–1.4)
EST. GFR  (NO RACE VARIABLE): >60 ML/MIN/1.73 M^2
GLUCOSE SERPL-MCNC: 180 MG/DL (ref 70–110)
POTASSIUM SERPL-SCNC: 4.9 MMOL/L (ref 3.5–5.1)
PROT SERPL-MCNC: 7.4 G/DL (ref 6–8.4)
SODIUM SERPL-SCNC: 139 MMOL/L (ref 136–145)

## 2023-07-27 PROCEDURE — 36415 COLL VENOUS BLD VENIPUNCTURE: CPT | Performed by: HOSPITALIST

## 2023-07-27 PROCEDURE — 80053 COMPREHEN METABOLIC PANEL: CPT | Performed by: HOSPITALIST

## 2023-08-02 ENCOUNTER — SPECIALTY PHARMACY (OUTPATIENT)
Dept: PHARMACY | Facility: CLINIC | Age: 58
End: 2023-08-02
Payer: MEDICAID

## 2023-08-02 DIAGNOSIS — C77.5 PROSTATE CANCER METASTATIC TO INTRAPELVIC LYMPH NODE: Primary | ICD-10-CM

## 2023-08-02 DIAGNOSIS — C61 PROSTATE CANCER METASTATIC TO INTRAPELVIC LYMPH NODE: Primary | ICD-10-CM

## 2023-08-02 NOTE — TELEPHONE ENCOUNTER
Specialty Pharmacy - Clinical Reassessment    Specialty Medication Orders Linked to Encounter      Flowsheet Row Most Recent Value   Medication #1 predniSONE (DELTASONE) 5 MG tablet (Order#[id number], Rx#[id number])   Medication #2 abiraterone (ZYTIGA) 250 mg Tab (Order#[id number], Rx#[id number])          Patient Diagnosis   C61, C77.5 - Prostate cancer metastatic to intrapelvic lymph node    Mauro Benitez is a 57 y.o. male, who is followed by the specialty pharmacy service for management and education of his Prostate cancer.  He has been on therapy with abiraterone for 6 months.  I have reviewed his electronic medical record and current medication list and determined that specialty medication adjustment Is not needed at this time.    Patient has not experienced adverse events.  He Is adherent reporting 0 missed doses since last review.  Adherence has been encouraged with the following mechanism(s): Refill calls to get on-hand counts to see if they match up with fill dates.  He is on target to meet goals of therapy and will continue treatment.        7/11/2023 6/13/2023 5/11/2023 4/10/2023 3/10/2023 2/10/2023   Follow Up Review   # of missed doses 0 0 5 0 0    New Medications? No Yes No No No    New Conditions? No No No No No    New Allergies? No No No No No    Med Effective? Good Good Good Good Good    Missed activities?      No   Urgent Care? No Yes Yes No No    Requested Pharmacist? No No No No No          Therapy is appropriate to continue.    Therapy is effective: Yes  On scale of 1 to 10, how does patient rank quality of life? (10 - Best): Unable to Assess  Recommendations: none at this time.  Review Method: Chart Review; Patient's most recent PSA from 7/21/23 is still undetectable (<0.01 ng/ml), suggesting that patient is adherent to therapy and that therapy is working for him. Will need to assess some parameters at next refill call, but overall chart review suggests that he is meeting goals of therapy. No  major DDIs found between abiraterone and other medications on his profile, though minor interactions exist with atorvastatin and amitriptyline, which can be addressed at refill call.    Tasks added this encounter   No tasks added.   Tasks due within next 3 months   8/2/2023 - Clinical Assessment (6 month recurrence)  8/4/2023 - Refill Coordination Outreach (1 time occurrence)     Edmar Duggan, PharmD  [facility]  [address] [facility] [address]  [facility] [address]  Phone: [phone]  Fax: [phone]

## 2023-08-03 ENCOUNTER — SPECIALTY PHARMACY (OUTPATIENT)
Dept: PHARMACY | Facility: CLINIC | Age: 58
End: 2023-08-03
Payer: MEDICAID

## 2023-08-03 NOTE — TELEPHONE ENCOUNTER
Spoke to patient's fianceGretel. She told me that patient works a lot and doesn't really answer his cell at work too much. However, she said we can reach him at his work phone [phone]. Will pend out 1 week to give patient a chance to call back, as based on when he last needed his Zytiga, he should have about 1 week left on hand

## 2023-08-03 NOTE — TELEPHONE ENCOUNTER
Spoke to Mr. Benitez; I let him know that his prednisone was too soon to refill until 8/6/23. He says he has about 7 of each tablet left, which will hold him over until 8/10/23. I let him know we will call back beginning of next week for refill. Patient voiced understanding

## 2023-08-07 NOTE — TELEPHONE ENCOUNTER
Specialty Pharmacy - Refill Coordination    Specialty Medication Orders Linked to Encounter      Flowsheet Row Most Recent Value   Medication #1 predniSONE (DELTASONE) 5 MG tablet (Order#[id number], Rx#[id number])   Medication #2 abiraterone (ZYTIGA) 250 mg Tab (Order#[id number], Rx#[id number])            Refill Questions - Documented Responses      Flowsheet Row Most Recent Value   Patient Availability and HIPAA Verification    Does patient want to proceed with activity? Yes   HIPAA/medical authority confirmed? Yes   Relationship to patient of person spoken to? Self   Refill Screening Questions    Changes to allergies? No   Changes to medications? No   New conditions since last clinic visit? No   Unplanned office visit, urgent care, ED, or hospital admission in the last 4 weeks? No   How does patient/caregiver feel medication is working? Good   Financial problems or insurance changes? No   How many doses of your specialty medications were missed in the last 4 weeks? 0   Would patient like to speak to a pharmacist? No   When does the patient need to receive the medication? 08/13/23   Refill Delivery Questions    How will the patient receive the medication? MEDRx   When does the patient need to receive the medication? 08/13/23   Shipping Address Home   Address in [facility] confirmed and updated if neccessary? Yes   Expected Copay ($) 0   Is the patient able to afford the medication copay? Yes   Payment Method zero copay   Days supply of Refill 30   Supplies needed? No supplies needed   Refill activity completed? Yes   Refill activity plan Refill scheduled   Shipment/Pickup Date: 08/10/23            Current Outpatient Medications   Medication Sig    abiraterone (ZYTIGA) 250 mg Tab Take 1 tablet (250 mg total) by mouth once daily Take as directed with a low fat meal.    acetaminophen (TYLENOL) 500 MG tablet Take 1,500 mg by mouth daily as needed for Pain.    amitriptyline (ELAVIL) 25 MG tablet Take 25 mg by  mouth every evening.    amLODIPine (NORVASC) 10 MG tablet Take 10 mg by mouth every evening.    aspirin (ECOTRIN) 81 MG EC tablet Aspir-81 mg tablet,delayed release   Take 1 tablet every day by oral route in the morning for 30 days.    atorvastatin (LIPITOR) 20 MG tablet Take 20 mg by mouth once daily.    atorvastatin (LIPITOR) 40 MG tablet atorvastatin 40 mg tablet   TAKE 1 TABLET BY MOUTH EVERY DAY AT BEDTIME    blood sugar diagnostic (ONETOUCH VERIO TEST STRIPS MISC) OneTouch Verio test strips    blood sugar diagnostic (ONETOUCH VERIO TEST STRIPS MISC) OneTouch Verio test strips   USE 1 TEST STRIP TO CHECK BLOOD SUGAR TWICE DAILY    blood sugar diagnostic Strp Accu-Chek Odalis Plus test strips   Take 1 strip twice a day by miscell. route.    calcium carbonate (OS-CALOS) 500 mg calcium (1,250 mg) tablet Take 2 tablets (1,000 mg total) by mouth once daily.    carvediloL (COREG) 25 MG tablet Take 1 tablet (25 mg total) by mouth 2 (two) times daily.    cholecalciferol, vitamin D3, (VITAMIN D3) 25 mcg (1,000 unit) capsule Take 1 capsule (1,000 Units total) by mouth once daily.    fluticasone propionate (FLONASE) 50 mcg/actuation nasal spray fluticasone propionate 50 mcg/actuation nasal spray,suspension   SPRAY 2 SPRAYS EVERY DAY BY INTRANASAL ROUTE AS NEEDED.    gabapentin (NEURONTIN) 400 MG capsule Take 400 mg by mouth 3 (three) times daily.    glipiZIDE (GLUCOTROL) 10 MG TR24 glipizide ER 10 mg tablet, extended release 24 hr   TAKE 1 TABLET BY MOUTH EVERY DAY    insulin glargine 100 units/mL SubQ pen Lantus Solostar U-100 Insulin 100 unit/mL (3 mL) subcutaneous pen   Inject 10 units every day by subcutaneous route in the evening.    loratadine (CLARITIN) 10 mg tablet loratadine 10 mg tablet    losartan (COZAAR) 100 MG tablet Take 100 mg by mouth once daily.    magnesium oxide (MAG-OX) 400 mg (241.3 mg magnesium) tablet magnesium oxide 400 mg (241.3 mg magnesium) tablet   TAKE 1 TABLET BY MOUTH EVERY DAY    metFORMIN  (GLUCOPHAGE) 1000 MG tablet metformin 1,000 mg tablet   TAKE 1 TABLET BY MOUTH TWICE DAILY    mirabegron (MYRBETRIQ) 25 mg Tb24 ER tablet Take 1 tablet (25 mg total) by mouth once daily.    omeprazole (PRILOSEC) 20 MG capsule omeprazole 20 mg capsule,delayed release   TAKE 1 TABLET BY MOUTH EVERY DAY    ondansetron (ZOFRAN-ODT) 4 MG TbDL ondansetron 4 mg disintegrating tablet   DISSOLVE 1 TABLET ON THE TONGUE THREE TIMES DAILY FOR 14 DAYS AS NEEDED    polyethylene glycol (GLYCOLAX) 17 gram PwPk Take 17 g by mouth once daily.    polyethylene glycol (GLYCOLAX) 17 gram/dose powder Take by mouth.    predniSONE (DELTASONE) 5 MG tablet Take 1 tablet (5 mg total) by mouth once daily. Take as directed with water on an empty stomach.    pregabalin (LYRICA) 50 MG capsule pregabalin 50 mg capsule   Take 1 capsule 3 times a day by oral route.   Taper off of gabapentin first    sildenafiL (VIAGRA) 100 MG tablet Take 100 mg by mouth daily as needed for Erectile Dysfunction.    SITagliptin phosphate (JANUVIA) 100 MG Tab Januvia 100 mg tablet    venlafaxine (EFFEXOR XR) 75 MG 24 hr capsule Take 1 capsule (75 mg total) by mouth once daily.    venlafaxine (EFFEXOR-XR) 37.5 MG 24 hr capsule venlafaxine ER 37.5 mg capsule,extended release 24 hr   Take 1 capsule every day by oral route.   Last reviewed on 8/2/2023 12:20 PM by Edmar Duggan, Angel    Review of patient's allergies indicates:   Allergen Reactions    Lisinopril Shortness Of Breath    Last reviewed on  8/2/2023 12:20 PM by Edmar Duggan      Tasks added this encounter   No tasks added.   Tasks due within next 3 months   8/8/2023 - Refill Coordination Outreach (1 time occurrence)     Ammy Suarez, PharmD  [facility]  [address] [place] [id number]  Phone: [phone]  Fax: [phone]

## 2023-08-09 ENCOUNTER — PATIENT MESSAGE (OUTPATIENT)
Dept: HEMATOLOGY/ONCOLOGY | Facility: CLINIC | Age: 58
End: 2023-08-09
Payer: MEDICAID

## 2023-08-09 ENCOUNTER — TELEPHONE (OUTPATIENT)
Dept: HEMATOLOGY/ONCOLOGY | Facility: CLINIC | Age: 58
End: 2023-08-09
Payer: MEDICAID

## 2023-08-09 NOTE — NURSING
RN Navigator unable to lvm for patient, vmail box is full, SMS notification sent, portal message sent with upcoming [facility] information, BENJIE Johnson.

## 2023-08-17 ENCOUNTER — OFFICE VISIT (OUTPATIENT)
Dept: RADIATION ONCOLOGY | Facility: CLINIC | Age: 58
End: 2023-08-17
Payer: MEDICAID

## 2023-08-17 VITALS
WEIGHT: 218.81 LBS | HEART RATE: 91 BPM | HEIGHT: 70 IN | SYSTOLIC BLOOD PRESSURE: 120 MMHG | OXYGEN SATURATION: 99 % | BODY MASS INDEX: 31.32 KG/M2 | DIASTOLIC BLOOD PRESSURE: 74 MMHG

## 2023-08-17 DIAGNOSIS — C77.5 PROSTATE CANCER METASTATIC TO INTRAPELVIC LYMPH NODE: ICD-10-CM

## 2023-08-17 DIAGNOSIS — C61 PROSTATE CANCER METASTATIC TO INTRAPELVIC LYMPH NODE: ICD-10-CM

## 2023-08-17 PROCEDURE — 99215 OFFICE O/P EST HI 40 MIN: CPT | Mod: PBBFAC | Performed by: RADIOLOGY

## 2023-08-17 PROCEDURE — 1159F MED LIST DOCD IN RCRD: CPT | Mod: CPTII,,, | Performed by: RADIOLOGY

## 2023-08-17 PROCEDURE — 3008F BODY MASS INDEX DOCD: CPT | Mod: CPTII,,, | Performed by: RADIOLOGY

## 2023-08-17 PROCEDURE — 3051F PR MOST RECENT HEMOGLOBIN A1C LEVEL 7.0 - < 8.0%: ICD-10-PCS | Mod: CPTII,,, | Performed by: RADIOLOGY

## 2023-08-17 PROCEDURE — 1160F RVW MEDS BY RX/DR IN RCRD: CPT | Mod: CPTII,,, | Performed by: RADIOLOGY

## 2023-08-17 PROCEDURE — 4010F PR ACE/ARB THEARPY RXD/TAKEN: ICD-10-PCS | Mod: CPTII,,, | Performed by: RADIOLOGY

## 2023-08-17 PROCEDURE — 3074F PR MOST RECENT SYSTOLIC BLOOD PRESSURE < 130 MM HG: ICD-10-PCS | Mod: CPTII,,, | Performed by: RADIOLOGY

## 2023-08-17 PROCEDURE — 3074F SYST BP LT 130 MM HG: CPT | Mod: CPTII,,, | Performed by: RADIOLOGY

## 2023-08-17 PROCEDURE — 99214 PR OFFICE/OUTPT VISIT, EST, LEVL IV, 30-39 MIN: ICD-10-PCS | Mod: S$PBB,,, | Performed by: RADIOLOGY

## 2023-08-17 PROCEDURE — 99999 PR PBB SHADOW E&M-EST. PATIENT-LVL V: ICD-10-PCS | Mod: PBBFAC,,, | Performed by: RADIOLOGY

## 2023-08-17 PROCEDURE — 3078F PR MOST RECENT DIASTOLIC BLOOD PRESSURE < 80 MM HG: ICD-10-PCS | Mod: CPTII,,, | Performed by: RADIOLOGY

## 2023-08-17 PROCEDURE — 99999 PR PBB SHADOW E&M-EST. PATIENT-LVL V: CPT | Mod: PBBFAC,,, | Performed by: RADIOLOGY

## 2023-08-17 PROCEDURE — 1159F PR MEDICATION LIST DOCUMENTED IN MEDICAL RECORD: ICD-10-PCS | Mod: CPTII,,, | Performed by: RADIOLOGY

## 2023-08-17 PROCEDURE — 3008F PR BODY MASS INDEX (BMI) DOCUMENTED: ICD-10-PCS | Mod: CPTII,,, | Performed by: RADIOLOGY

## 2023-08-17 PROCEDURE — 1160F PR REVIEW ALL MEDS BY PRESCRIBER/CLIN PHARMACIST DOCUMENTED: ICD-10-PCS | Mod: CPTII,,, | Performed by: RADIOLOGY

## 2023-08-17 PROCEDURE — 4010F ACE/ARB THERAPY RXD/TAKEN: CPT | Mod: CPTII,,, | Performed by: RADIOLOGY

## 2023-08-17 PROCEDURE — 99214 OFFICE O/P EST MOD 30 MIN: CPT | Mod: S$PBB,,, | Performed by: RADIOLOGY

## 2023-08-17 PROCEDURE — 3078F DIAST BP <80 MM HG: CPT | Mod: CPTII,,, | Performed by: RADIOLOGY

## 2023-08-17 PROCEDURE — 3051F HG A1C>EQUAL 7.0%<8.0%: CPT | Mod: CPTII,,, | Performed by: RADIOLOGY

## 2023-08-17 NOTE — PROGRESS NOTES
[facility] - Radiation Oncology    Patient ID: aMuro Benitez is a 57 y.o. male.    Chief Complaint: Follow-up    Mr. Benitez has a history of recurrent prostate cancer.  He initially presented in 2015 with increased PSA of 14 ng/ml. Biopsies revealed Jagdeep 6 and 7 prostate cancer.  In November of 2015 he underwent RRP which revealed Jagdeep 7 (4+3) adenocarcinoma involving > 50% of the prostate with extraprostatic extension and a positive margins.  Pathologic stage III (T3a, N0, M0) disease.  Postoperative PSA was 0.36 and he received adjuvant radiotherapy to the prostate bed (68.4 Gy) with hormonal therapy for 6 months.  He relocated to [place].  Repeat PSA in June of 2022 returned at 2.8 ng/ml.  PSMA scan on 6/21/23 revealed tracer-avid left para-aortic, presacral/left common iliac, and left external iliac chain nodes.  The patient began hormonal therapy.  Currently receiving Lupron along with abiraterone and prednisone.  Today the patient states he feels well.  Working full time.        Review of Systems   Constitutional:  Negative for activity change, appetite change, chills and fatigue.   Respiratory:  Negative for cough and shortness of breath.    Gastrointestinal:  Negative for abdominal pain, constipation, diarrhea and fecal incontinence.   Genitourinary:  Negative for bladder incontinence, difficulty urinating, dysuria, flank pain and hematuria.     Physical Exam  Constitutional:       General: He is not in acute distress.     Appearance: Normal appearance.   Pulmonary:      Effort: Pulmonary effort is normal. No respiratory distress.   Neurological:      Mental Status: He is alert and oriented to person, place, and time.   Psychiatric:         Mood and Affect: Mood normal.         Judgment: Judgment normal.            Assessment and Plan     Recurrent prostate cancer   - Patient with recurrent N1 M1a disease.  I had a long discussion with the patient and his wife.  We discussed  "possible radiotherapy to the pelvic and para - aortic nodes.  Explained the procedures, risks and benefits of radiotherapy.  Explained, given the area of uptake one could consider radiotherapy in the "curative" manner but it is unclear if this would be curative.  Explained we would plan to offer radiotherapy then at some point, discontinue his hormonal therapy.  The patient was agreeable with this plan.  Will plan to obtain his radiotherapy records followed by simulation .   "

## 2023-08-22 ENCOUNTER — HOSPITAL ENCOUNTER (OUTPATIENT)
Dept: RADIATION THERAPY | Facility: HOSPITAL | Age: 58
Discharge: HOME | End: 2023-08-22
Attending: RADIOLOGY
Payer: MEDICAID

## 2023-08-22 PROCEDURE — 77334 RADIATION TREATMENT AID(S): CPT | Mod: 26,,, | Performed by: RADIOLOGY

## 2023-08-22 PROCEDURE — 77263 PR  RADIATION THERAPY PLAN COMPLEX: ICD-10-PCS | Mod: ,,, | Performed by: RADIOLOGY

## 2023-08-22 PROCEDURE — 77334 PR  RADN TREATMENT AID(S) COMPLX: ICD-10-PCS | Mod: 26,,, | Performed by: RADIOLOGY

## 2023-08-22 PROCEDURE — 77263 THER RADIOLOGY TX PLNG CPLX: CPT | Mod: ,,, | Performed by: RADIOLOGY

## 2023-08-22 PROCEDURE — 77014 PR  CT GUIDANCE PLACEMENT RAD THERAPY FIELDS: CPT | Mod: 26,,, | Performed by: RADIOLOGY

## 2023-08-22 PROCEDURE — 77014 HC CT GUIDANCE RADIATION THERAPY FLDS PLACEMENT: CPT | Mod: TC | Performed by: RADIOLOGY

## 2023-08-22 PROCEDURE — 77334 RADIATION TREATMENT AID(S): CPT | Mod: TC | Performed by: RADIOLOGY

## 2023-08-22 PROCEDURE — 77014 PR  CT GUIDANCE PLACEMENT RAD THERAPY FIELDS: ICD-10-PCS | Mod: 26,,, | Performed by: RADIOLOGY

## 2023-08-29 ENCOUNTER — PATIENT MESSAGE (OUTPATIENT)
Dept: RADIATION THERAPY | Facility: OTHER | Age: 58
End: 2023-08-29
Payer: MEDICAID

## 2023-08-29 DIAGNOSIS — F32.A DEPRESSION, UNSPECIFIED DEPRESSION TYPE: ICD-10-CM

## 2023-08-29 DIAGNOSIS — R23.2 HOT FLASHES: ICD-10-CM

## 2023-08-29 RX ORDER — VENLAFAXINE HYDROCHLORIDE 75 MG/1
75 CAPSULE, EXTENDED RELEASE ORAL DAILY
Qty: 30 CAPSULE | Refills: 2 | Status: SHIPPED | OUTPATIENT
Start: 2023-08-29 | End: 2023-09-08

## 2023-09-01 ENCOUNTER — HOSPITAL ENCOUNTER (OUTPATIENT)
Dept: RADIATION THERAPY | Facility: HOSPITAL | Age: 58
Discharge: HOME | End: 2023-09-01
Attending: RADIOLOGY
Payer: MEDICAID

## 2023-09-05 DIAGNOSIS — R23.2 HOT FLASHES: ICD-10-CM

## 2023-09-05 DIAGNOSIS — F32.A DEPRESSION, UNSPECIFIED DEPRESSION TYPE: ICD-10-CM

## 2023-09-05 RX ORDER — VENLAFAXINE HYDROCHLORIDE 75 MG/1
75 CAPSULE, EXTENDED RELEASE ORAL DAILY
Qty: 30 CAPSULE | Refills: 2 | OUTPATIENT
Start: 2023-09-05 | End: 2024-09-04

## 2023-09-08 ENCOUNTER — OFFICE VISIT (OUTPATIENT)
Dept: HEMATOLOGY/ONCOLOGY | Facility: CLINIC | Age: 58
End: 2023-09-08
Payer: MEDICAID

## 2023-09-08 ENCOUNTER — PATIENT MESSAGE (OUTPATIENT)
Dept: UROLOGY | Facility: CLINIC | Age: 58
End: 2023-09-08
Payer: MEDICAID

## 2023-09-08 ENCOUNTER — LAB VISIT (OUTPATIENT)
Dept: LAB | Facility: HOSPITAL | Age: 58
End: 2023-09-08
Attending: HOSPITALIST
Payer: MEDICAID

## 2023-09-08 VITALS
DIASTOLIC BLOOD PRESSURE: 77 MMHG | SYSTOLIC BLOOD PRESSURE: 131 MMHG | BODY MASS INDEX: 29.98 KG/M2 | RESPIRATION RATE: 18 BRPM | OXYGEN SATURATION: 97 % | WEIGHT: 209.44 LBS | HEART RATE: 81 BPM | HEIGHT: 70 IN | TEMPERATURE: 99 F

## 2023-09-08 DIAGNOSIS — Z79.818 ANDROGEN DEPRIVATION THERAPY: ICD-10-CM

## 2023-09-08 DIAGNOSIS — C77.5 PROSTATE CANCER METASTATIC TO INTRAPELVIC LYMPH NODE: Primary | ICD-10-CM

## 2023-09-08 DIAGNOSIS — R35.0 URINARY FREQUENCY: ICD-10-CM

## 2023-09-08 DIAGNOSIS — C61 PROSTATE CANCER METASTATIC TO INTRAPELVIC LYMPH NODE: Primary | ICD-10-CM

## 2023-09-08 DIAGNOSIS — M85.80 OSTEOPENIA, UNSPECIFIED LOCATION: ICD-10-CM

## 2023-09-08 DIAGNOSIS — C61 PROSTATE CANCER METASTATIC TO INTRAPELVIC LYMPH NODE: ICD-10-CM

## 2023-09-08 DIAGNOSIS — C77.5 PROSTATE CANCER METASTATIC TO INTRAPELVIC LYMPH NODE: ICD-10-CM

## 2023-09-08 DIAGNOSIS — R39.15 URINARY URGENCY: ICD-10-CM

## 2023-09-08 DIAGNOSIS — Z80.42 FAMILY HISTORY OF PROSTATE CANCER IN FATHER: ICD-10-CM

## 2023-09-08 DIAGNOSIS — E11.42 DIABETIC PERIPHERAL NEUROPATHY: ICD-10-CM

## 2023-09-08 DIAGNOSIS — I10 HYPERTENSION, UNSPECIFIED TYPE: ICD-10-CM

## 2023-09-08 DIAGNOSIS — E78.2 MIXED HYPERLIPIDEMIA: ICD-10-CM

## 2023-09-08 DIAGNOSIS — C61 PROSTATE CANCER: ICD-10-CM

## 2023-09-08 DIAGNOSIS — E11.42 TYPE 2 DIABETES MELLITUS WITH DIABETIC POLYNEUROPATHY, WITHOUT LONG-TERM CURRENT USE OF INSULIN: ICD-10-CM

## 2023-09-08 LAB
ALBUMIN SERPL BCP-MCNC: 3.5 G/DL (ref 3.5–5.2)
ALP SERPL-CCNC: 84 U/L (ref 55–135)
ALT SERPL W/O P-5'-P-CCNC: 74 U/L (ref 10–44)
ANION GAP SERPL CALC-SCNC: 11 MMOL/L (ref 8–16)
AST SERPL-CCNC: 36 U/L (ref 10–40)
BILIRUB SERPL-MCNC: 0.4 MG/DL (ref 0.1–1)
BUN SERPL-MCNC: 11 MG/DL (ref 6–20)
CALCIUM SERPL-MCNC: 9.5 MG/DL (ref 8.7–10.5)
CHLORIDE SERPL-SCNC: 105 MMOL/L (ref 95–110)
CO2 SERPL-SCNC: 22 MMOL/L (ref 23–29)
COMPLEXED PSA SERPL-MCNC: <0.01 NG/ML (ref 0–4)
CREAT SERPL-MCNC: 1.2 MG/DL (ref 0.5–1.4)
ERYTHROCYTE [DISTWIDTH] IN BLOOD BY AUTOMATED COUNT: 14.4 % (ref 11.5–14.5)
EST. GFR  (NO RACE VARIABLE): >60 ML/MIN/1.73 M^2
GLUCOSE SERPL-MCNC: 256 MG/DL (ref 70–110)
HCT VFR BLD AUTO: 38.4 % (ref 40–54)
HGB BLD-MCNC: 11.8 G/DL (ref 14–18)
IMM GRANULOCYTES # BLD AUTO: 0.02 K/UL (ref 0–0.04)
MCH RBC QN AUTO: 28.4 PG (ref 27–31)
MCHC RBC AUTO-ENTMCNC: 30.7 G/DL (ref 32–36)
MCV RBC AUTO: 93 FL (ref 82–98)
NEUTROPHILS # BLD AUTO: 6.1 K/UL (ref 1.8–7.7)
PLATELET # BLD AUTO: 296 K/UL (ref 150–450)
PMV BLD AUTO: 10.5 FL (ref 9.2–12.9)
POTASSIUM SERPL-SCNC: 4.7 MMOL/L (ref 3.5–5.1)
PROSTATE SPECIFIC ANTIGEN, TOTAL: <0.01 NG/ML (ref 0–4)
PROT SERPL-MCNC: 6.8 G/DL (ref 6–8.4)
PSA FREE MFR SERPL: NORMAL %
PSA FREE SERPL-MCNC: <0.1 NG/ML (ref 0–1.5)
RBC # BLD AUTO: 4.15 M/UL (ref 4.6–6.2)
SODIUM SERPL-SCNC: 138 MMOL/L (ref 136–145)
TESTOST SERPL-MCNC: 5 NG/DL (ref 304–1227)
WBC # BLD AUTO: 8.14 K/UL (ref 3.9–12.7)

## 2023-09-08 PROCEDURE — 3008F BODY MASS INDEX DOCD: CPT | Mod: CPTII,,, | Performed by: HOSPITALIST

## 2023-09-08 PROCEDURE — 84154 ASSAY OF PSA FREE: CPT | Performed by: UROLOGY

## 2023-09-08 PROCEDURE — 4010F ACE/ARB THERAPY RXD/TAKEN: CPT | Mod: CPTII,,, | Performed by: HOSPITALIST

## 2023-09-08 PROCEDURE — 84153 ASSAY OF PSA TOTAL: CPT | Mod: 91 | Performed by: HOSPITALIST

## 2023-09-08 PROCEDURE — 80053 COMPREHEN METABOLIC PANEL: CPT | Performed by: HOSPITALIST

## 2023-09-08 PROCEDURE — 4010F PR ACE/ARB THEARPY RXD/TAKEN: ICD-10-PCS | Mod: CPTII,,, | Performed by: HOSPITALIST

## 2023-09-08 PROCEDURE — 99213 OFFICE O/P EST LOW 20 MIN: CPT | Mod: PBBFAC | Performed by: HOSPITALIST

## 2023-09-08 PROCEDURE — 3051F HG A1C>EQUAL 7.0%<8.0%: CPT | Mod: CPTII,,, | Performed by: HOSPITALIST

## 2023-09-08 PROCEDURE — 3051F PR MOST RECENT HEMOGLOBIN A1C LEVEL 7.0 - < 8.0%: ICD-10-PCS | Mod: CPTII,,, | Performed by: HOSPITALIST

## 2023-09-08 PROCEDURE — 3078F PR MOST RECENT DIASTOLIC BLOOD PRESSURE < 80 MM HG: ICD-10-PCS | Mod: CPTII,,, | Performed by: HOSPITALIST

## 2023-09-08 PROCEDURE — 3075F SYST BP GE 130 - 139MM HG: CPT | Mod: CPTII,,, | Performed by: HOSPITALIST

## 2023-09-08 PROCEDURE — 3075F PR MOST RECENT SYSTOLIC BLOOD PRESS GE 130-139MM HG: ICD-10-PCS | Mod: CPTII,,, | Performed by: HOSPITALIST

## 2023-09-08 PROCEDURE — 99215 PR OFFICE/OUTPT VISIT, EST, LEVL V, 40-54 MIN: ICD-10-PCS | Mod: S$PBB,,, | Performed by: HOSPITALIST

## 2023-09-08 PROCEDURE — 99215 OFFICE O/P EST HI 40 MIN: CPT | Mod: S$PBB,,, | Performed by: HOSPITALIST

## 2023-09-08 PROCEDURE — 99999 PR PBB SHADOW E&M-EST. PATIENT-LVL III: CPT | Mod: PBBFAC,,, | Performed by: HOSPITALIST

## 2023-09-08 PROCEDURE — 99999 PR PBB SHADOW E&M-EST. PATIENT-LVL III: ICD-10-PCS | Mod: PBBFAC,,, | Performed by: HOSPITALIST

## 2023-09-08 PROCEDURE — 3008F PR BODY MASS INDEX (BMI) DOCUMENTED: ICD-10-PCS | Mod: CPTII,,, | Performed by: HOSPITALIST

## 2023-09-08 PROCEDURE — 36415 COLL VENOUS BLD VENIPUNCTURE: CPT | Performed by: HOSPITALIST

## 2023-09-08 PROCEDURE — 85027 COMPLETE CBC AUTOMATED: CPT | Performed by: HOSPITALIST

## 2023-09-08 PROCEDURE — 3078F DIAST BP <80 MM HG: CPT | Mod: CPTII,,, | Performed by: HOSPITALIST

## 2023-09-08 PROCEDURE — 84403 ASSAY OF TOTAL TESTOSTERONE: CPT | Performed by: UROLOGY

## 2023-09-08 RX ORDER — CALCIUM CARBONATE 500(1250)
2 TABLET ORAL DAILY
Qty: 60 TABLET | Refills: 11 | Status: SHIPPED | OUTPATIENT
Start: 2023-09-08 | End: 2023-09-26 | Stop reason: SDUPTHER

## 2023-09-08 NOTE — PROGRESS NOTES
ADVANCED PROSTATE CANCER CLINIC - FOLLOW UP     Best Contact Phone Number(s): [phone] (home)       Cancer/Stage/TNM:    Cancer Staging   Prostate cancer metastatic to intrapelvic lymph node  Staging form: Prostate, AJCC 8th Edition  - Clinical: Stage IVB (cTX, cN1, cM1a, PSA: 2.8, Grade Group: 3) - Signed by Edward Zuleta MD on 2/1/2023        Reason for visit: Mauro Benitez is a 57 y.o. male with history of prostate cancer sp RALP with positive margins 11/2/2015 with adjuvant RT to the prostate bed. He was subsequently found to have BCR with PSA 2.8  and maren relapse on PSMA imaging 06/2022. He started ADT 10/2022 with addition of /5 started 02/2023.  Medical course notable for recurrent nausea and vomiting thought due to gastroparesis.       History has been obtained by chart review and discussion with the patient.    Interval Events:      Energy is OK. Continues to get a few hot flashes in the morning; generally tolerable. Has dribbling at end urination with ongoing urgency and occaisional urge incontinence. Unclear if myrbetriq is working. Seen twice in the ED last week with recurrent nausea and vomiting. Reports resolution of symptoms. No current abdominal pain. Reports normal bowel movements. Missed a few doses of abieraterone in setting of recent flare of N/V.    Saw rad onc on 8/17/23, will plan for salvage pelvic radiation. Scheduled to see Dr. Edmond 9/26/23 for ADT.     Recently started metformin along with his home lantus for DM2.     Oncology History   Prostate cancer metastatic to intrapelvic lymph node   10/12/2015 Imaging Significant Findings    CT a/p: No evidence of distant or maren metastatic disease    Bone scan: No evidence of metastatic disease. 1cm rounded area of uptake along left superomedial orbit/sphenoid thought possibly calcified menigioma.     11/2/2015 Surgery    Radical prostatectomy at [facility] in [place]. Pathology with prostate adenocarcinoma  up to Jagdeep 4+3 with associated PNI. Positive margin. 0/4 LN involved. Staged pT3aN0     2/23/2016 Imaging Significant Findings    Nuclear bone scan: No evidence of metastatic disease. Note made of uptake in left supraorbital region thought c/w left frontal sinusitis along with uptake in mandible and maxilla c/w periosteal disease.     4/27/2021 Imaging Significant Findings    Bone scan:  There is abnormal increased radiotracer activity identified involving the maxilla and multiple paranasal sinuses likely related to periodontal disease and sinus disease. Recommend clinical correlation and further evaluation with maxillofacial CT as warranted.     6/6/2022 Tumor Markers    PSA 2.8     6/21/2022 Imaging Significant Findings    PSMA PET-CT: In this patient with prostate malignancy status post prostatectomy, there are tracer-avid left para-aortic, presacral/left common iliac, and left external iliac chain nodes as described above, concerning for recurrent metastatic disease.     9/16/2022 -  Hormone Therapy    Start bicalutamide 50mg po daily     10/3/2022 -  Hormone Therapy    Trelstar 11.25mg IM x1     12/19/2022 Tumor Markers    PSA 0.02     1/4/2023 -  Hormone Therapy    Trelstart 11.25mg IM x1 with Dr. Edmond     2/1/2023 -  Chemotherapy    Treatment Summary   Plan Name: OP ABIRATERONE DAILY PREDNISONE BID  Treatment Goal: Curative  Status: Active  Start Date: 2/1/2023  End Date: 2/1/2023  Provider: Edward Zuleta MD  Chemotherapy: abiraterone (ZYTIGA) 250 mg Tab, 250 mg (100 % of original dose 250 mg), Oral, Daily, 1 of 1 cycle, Start date: 2/1/2023, End date: --  Dose modification: 250 mg (original dose 250 mg, Cycle 1)     3/27/2023 Imaging Significant Findings    DEXA - Low risk osteopenia; repeat 2 years     3/29/2023 -  Hormone Therapy    Triptorelin 11.25mg           Past Medical History:   Diagnosis Date    Diabetes mellitus     GERD (gastroesophageal reflux disease)     Hypertension     Prostate  cancer          Past Surgical History:   Procedure Laterality Date    ESOPHAGOGASTRODUODENOSCOPY N/A 12/5/2022    Procedure: EGD (ESOPHAGOGASTRODUODENOSCOPY);  Surgeon: Beronica Valera MD;  Location: [facility];  Service: Endoscopy;  Laterality: N/A;    PROSTATECTOMY           I have reviewed and updated the patient's past medical, surgical, family and social histories.     Review of patient's allergies indicates:   Allergen Reactions    Lisinopril Shortness Of Breath         Current Outpatient Medications   Medication Sig Dispense Refill    abiraterone (ZYTIGA) 250 mg Tab Take 1 tablet (250 mg total) by mouth once daily Take as directed with a low fat meal. 30 tablet 11    acetaminophen (TYLENOL) 500 MG tablet Take 1,500 mg by mouth daily as needed for Pain.      amLODIPine (NORVASC) 10 MG tablet Take 10 mg by mouth every evening.      aspirin (ECOTRIN) 81 MG EC tablet Aspir-81 mg tablet,delayed release   Take 1 tablet every day by oral route in the morning for 30 days.      atorvastatin (LIPITOR) 40 MG tablet atorvastatin 40 mg tablet   TAKE 1 TABLET BY MOUTH EVERY DAY AT BEDTIME      blood sugar diagnostic (ONETOUCH VERIO TEST STRIPS MISC) OneTouch Verio test strips      blood sugar diagnostic (ONETOUCH VERIO TEST STRIPS MISC) OneTouch Verio test strips   USE 1 TEST STRIP TO CHECK BLOOD SUGAR TWICE DAILY      blood sugar diagnostic Strp Accu-Chek Odalis Plus test strips   Take 1 strip twice a day by miscell. route.      calcium carbonate (OS-CALOS) 500 mg calcium (1,250 mg) tablet Take 2 tablets (1,000 mg total) by mouth once daily. 60 tablet 11    carvediloL (COREG) 25 MG tablet Take 1 tablet (25 mg total) by mouth 2 (two) times daily. 60 tablet 11    cholecalciferol, vitamin D3, (VITAMIN D3) 25 mcg (1,000 unit) capsule Take 1 capsule (1,000 Units total) by mouth once daily. 30 capsule 11    fluticasone propionate (FLONASE) 50 mcg/actuation nasal spray fluticasone propionate 50 mcg/actuation nasal  "spray,suspension   SPRAY 2 SPRAYS EVERY DAY BY INTRANASAL ROUTE AS NEEDED.      insulin glargine 100 units/mL SubQ pen Lantus Solostar U-100 Insulin 100 unit/mL (3 mL) subcutaneous pen   Inject 10 units every day by subcutaneous route in the evening.      loratadine (CLARITIN) 10 mg tablet loratadine 10 mg tablet      losartan (COZAAR) 100 MG tablet Take 100 mg by mouth once daily.      metFORMIN (GLUCOPHAGE) 1000 MG tablet metformin 1,000 mg tablet   TAKE 1 TABLET BY MOUTH TWICE DAILY      mirabegron (MYRBETRIQ) 25 mg Tb24 ER tablet Take 1 tablet (25 mg total) by mouth once daily. 30 tablet 11    omeprazole (PRILOSEC) 20 MG capsule omeprazole 20 mg capsule,delayed release   TAKE 1 TABLET BY MOUTH EVERY DAY      ondansetron (ZOFRAN-ODT) 4 MG TbDL ondansetron 4 mg disintegrating tablet   DISSOLVE 1 TABLET ON THE TONGUE THREE TIMES DAILY FOR 14 DAYS AS NEEDED      polyethylene glycol (GLYCOLAX) 17 gram PwPk Take 17 g by mouth once daily. 30 each 0    predniSONE (DELTASONE) 5 MG tablet Take 1 tablet (5 mg total) by mouth once daily. Take as directed with water on an empty stomach. 30 tablet 11    pregabalin (LYRICA) 50 MG capsule pregabalin 50 mg capsule   Take 1 capsule 3 times a day by oral route.   Taper off of gabapentin first      prochlorperazine (COMPAZINE) 10 MG tablet Take 1 tablet (10 mg total) by mouth every 6 (six) hours as needed (nausea). 24 tablet 0    sildenafiL (VIAGRA) 100 MG tablet Take 100 mg by mouth daily as needed for Erectile Dysfunction.       Current Facility-Administered Medications   Medication Dose Route Frequency Provider Last Rate Last Admin    triptorelin pamoate SusR 11.25 mg  11.25 mg Intramuscular 1 time in Clinic/HOD Edward Edmond MD            Objective:      Physical Exam:   /77 (BP Location: Left arm, Patient Position: Sitting, BP Method: Medium (Automatic))   Pulse 81   Temp 98.5 °F (36.9 °C) (Oral)   Resp 18   Ht 5' 10" (1.778 m)   Wt 95 kg (209 lb 7 oz)   SpO2 " 97%   BMI 30.05 kg/m²       ECOG Performance status: (1) Restricted in physically strenuous activity, ambulatory and able to do work of light nature     Physical Exam     Recent Labs:   Lab Results   Component Value Date    WBC 8.14 09/08/2023    RBC 4.15 (L) 09/08/2023    HGB 11.8 (L) 09/08/2023    HCT 38.4 (L) 09/08/2023    MCV 93 09/08/2023    MCH 28.4 09/08/2023    MCHC 30.7 (L) 09/08/2023    RDW 14.4 09/08/2023     09/08/2023    MPV 10.5 09/08/2023    IMMGR 0.2 09/03/2023    GRAN 6.1 09/08/2023    IGABS 0.02 09/08/2023    LYMPH 2.5 09/03/2023    LYMPH 29.1 09/03/2023    MONO 0.6 09/03/2023    MONO 6.6 09/03/2023    EOS 0.2 09/03/2023    BASO 0.05 09/03/2023    NRBC 0 09/03/2023    EOSINOPHIL 2.7 09/03/2023    BASOPHIL 0.6 09/03/2023    DIFFMETHOD Automated 09/03/2023       Lab Results   Component Value Date     09/08/2023    K 4.7 09/08/2023     09/08/2023    CO2 22 (L) 09/08/2023     (H) 09/08/2023    BUN 11 09/08/2023    CREATININE 1.2 09/08/2023    CALCIUM 9.5 09/08/2023    PROT 6.8 09/08/2023    ALBUMIN 3.5 09/08/2023    BILITOT 0.4 09/08/2023    ALKPHOS 84 09/08/2023    AST 36 09/08/2023    ALT 74 (H) 09/08/2023    ANIONGAP 11 09/08/2023    EGFRNORACEVR >60.0 09/08/2023        Lab Results   Component Value Date    PSADIAG <0.01 09/08/2023    PSADIAG <0.01 07/21/2023    PSADIAG <0.01 06/09/2023    PSADIAG <0.01 05/12/2023    PSADIAG <0.01 05/01/2023    PSADIAG <0.01 04/14/2023    PSADIAG <0.01 03/30/2023    PSADIAG <0.01 03/16/2023    PSADIAG <0.01 03/01/2023    PSADIAG <0.01 02/01/2023    PSATOTAL <0.01 09/08/2023    PSATOTAL <0.01 06/21/2023    PSATOTAL <0.01 03/22/2023    PSATOTAL 0.02 12/19/2022    PSATOTAL 2.8 06/06/2022        Cardiovascular Screening:  Primary care physician: Kosta Roberts MD      The 10-year ASCVD risk score (Niraj MCFADDEN, et al., 2019) is: 34.5%    Values used to calculate the score:      Age: 57 years      Sex: Male      Is Non-   "American: Yes      Diabetic: Yes      Tobacco smoker: Yes      Systolic Blood Pressure: 131 mmHg      Is BP treated: Yes      HDL Cholesterol: 51 mg/dL      Total Cholesterol: 158 mg/dL    ASCVD Risk Level: High risk >= 20%    EKG:   Results for orders placed or performed during the hospital encounter of 04/30/23   EKG 12-lead    Collection Time: 04/30/23  7:50 AM    Narrative    Test Reason : R11.0,    Vent. Rate : 082 BPM     Atrial Rate : 082 BPM     P-R Int : 116 ms          QRS Dur : 080 ms      QT Int : 374 ms       P-R-T Axes : 060 029 046 degrees     QTc Int : 436 ms    Normal sinus rhythm  Cannot rule out Anterior infarct ,age undetermined  Abnormal ECG  When compared with ECG of 28-APR-2023 14:25,  QT has lengthened  Confirmed by Primitivo BEYER MD, Fady SEARS ([id number]) on 5/1/2023 11:04:03 AM    Referred By: DEEPAK REYES           Confirmed By:Fady Langford III, MD       High blood pressure = Yes  Antihypertensive agents: amLODIPine - 10 MG  carvediloL - 25 MG  losartan - 100 MG   Comments:     DM2: Yes  Antidiabetic agents: insulin - 100 unit/mL (3 mL)   Comments:     Hyperlipidemia: Yes  Lipid lowering agents: atorvastatin - 40 MG   Comments:     Antiplatelet therapy: Yes  Agent: aspirin - 81 MG   Comment:     Body mass index is 30.05 kg/m².  If greater than 30, referral to nutrition    Lab Results   Component Value Date    CHOL 158 07/21/2023    LDLCALC 90.8 07/21/2023    HDL 51 07/21/2023    TRIG 81 07/21/2023    HGBA1C 7.0 (H) 05/12/2023        Bone Health    No results found for: "[id number]"     Results for orders placed during the hospital encounter of 03/14/23    DXA Bone Density with Vertebral Fracture    Impression  *Low bone mass (Osteopenia); FRAX calculations do not support treatment as osteoporosis.    RECOMMENDATIONS:  *Daily calcium intake 5508-8803 mg, dietary sources preferred; Vitamin D 7911-8693 IU daily.  *Weight bearing exercise and fall precautions.  *If dedicated imaging is " negative for vertebral fracture, then no additional treatment is recommended at this time. If positive for fracture, would treat as osteoporosis.  *If patient smokes would recommend cessation.  *Repeat BMD in 2 years.    EXPLANATION OF RESULTS:  T-score compares these results to the average bone density of a 20-29 year-old of the same gender.    Z-score compares this result to the average bone density to people of the same age, gender, and race.    The amounts indicate the number of standard deviations above or below the mean.    * Osteoporosis is generally defined as having a T-score between less than or equal to -2.5.    * Low bone mass (osteopenia) is generally defined as having a T-score between -1.0 and -2.5.    * The normal range is generally defined as having a T-score greater than or equal to -1.0.    * Calculated FRAX scores for fracture risk prediction may not be accurate in the setting of certain clinical factors such as pharmacologic therapy for osteoporosis, prior fragility fractures, high dose glucocorticoid use.      Electronically signed by: Tayla Amato MD  Date:    03/27/2023  Time:    07:46       Vitamin D: 1000 IU daily  Calcium: Recommend 4 servings of dairy daily     Osteopenia/Osteoporosis: Osteopenia    Bone strengthening agent: None     Staging Imaging     Results for orders placed during the hospital encounter of 06/21/22    NM PET CT F 18 PYL PSMA, Midthigh to Vertex    Impression  In this patient with prostate malignancy status post prostatectomy, there are tracer-avid left para-aortic, presacral/left common iliac, and left external iliac chain nodes as described above, concerning for recurrent metastatic disease.    Additional findings as above.    This report was flagged in Epic as abnormal.    I, Narayan Harding MD, attest that I reviewed and interpreted the images.    Electronically signed by resident: Harshil Castaneda  Date:    06/21/2022  Time:    15:45    Electronically signed  by: Narayan Harding  Date:    06/21/2022  Time:    17:34       Results for orders placed during the hospital encounter of 04/27/21    NM Bone Scan Whole Body    Impression  There is abnormal increased radiotracer activity identified involving the maxilla and multiple paranasal sinuses likely related to periodontal disease and sinus disease. Recommend clinical correlation and further evaluation with maxillofacial CT as warranted.      Electronically signed by: Anshul Ayon MD  Date:    04/27/2021  Time:    13:19      No results found for this or any previous visit.       Results for orders placed during the hospital encounter of 04/30/23    CT Abdomen Pelvis With Contrast    Impression  No acute abdominal or pelvic pathology CT of the abdomen and pelvis with contrast.    Mild fatty infiltration of the liver.    Indeterminate adrenal lesions, which were not increased in size.    Indeterminate 11-12 mm left upper pole renal cystic lesion.  Findings may represent a complex cyst.  If warranted, consider follow-up renal ultrasound when clinically appropriate.    Questionably a mildly thickened urinary bladder wall.  Findings may be due to infection or lower under distension.    Colonic diverticulosis.    This report was flagged in Epic as abnormal.    The preliminary and final reports are near concordant.      Electronically signed by: Anna Proctor  Date:    04/30/2023  Time:    19:04       I have personally reviewed the above imaging.     Path:   Reviewed pathology as documented above.    Genomic testing:     Germline genetic testing  No results found for this or any previous visit.     Somatic tumor genotyping:      ctDNA genotyping:  Miscellaneous Genomic Results       Tumor NGS Blood  Resulted: 5/21/23 Status: Final result       Detected - Pathogenic (2)      Gene Variant Allelic State & Phase   TP53 p.R282W - c.844C>T Missense variant - LOF --   TP53 p.L194R - c.581T>G Missense variant - LOF --                              Diagnoses:     1. Prostate cancer metastatic to intrapelvic lymph node    2. Androgen deprivation therapy    3. Hypertension, unspecified type    4. Mixed hyperlipidemia    5. Diabetic peripheral neuropathy    6. Type 2 diabetes mellitus with diabetic polyneuropathy, without long-term current use of insulin    7. Osteopenia, unspecified location    8. Prostate cancer          Assessment and Plan:     1. Prostate cancer metastatic to intrapelvic lymph node  Overview:  Intermediate risk Jagdeep 4+3 disease sp R1 prostatectomy 2015 with adjuvant RT presumably to prostate bed. Now with PSA recurrence and PSMA imaging with regional maren metastases and limited intraabdominal maren mets.Started intensified hormonal therapy with ADT + abiraterone 02/2023. Plan for further consideration of RT.    Assessment & Plan:  Tolerating ADT + AAP well. No longer using desvenlafaxine for hot flashes. Will monitor. Starts RT next week.  Scheduled to get ADT with Dr. Edmond next week.  - Needs to schedule genetics appt  - Con't ADT + AAP  - Safety labs in 6 weeks; FU in 12 weeks      2. Androgen deprivation therapy  Overview:  Current agent: Triptorelin 11.25 (through Dr. Edmond)  Start date: 10/2022  Last dose: 6/28/23  Next dose: 9/20/23  Duration of therapy: Minimum 2 years    Novel Androgen signaling inhibitors:   Agent: Abiraterone + prednisone 250/5  Start date: 02/2023          3. Hypertension, unspecified type  Overview:  Home medications include carvedilol, amlodipine, and losartan.       4. Mixed hyperlipidemia  Overview:  Home medications include atorvastatin and aspirin for primary prophylaxis.      5. Diabetic peripheral neuropathy  Overview:  Previously on gabapentin and amitryptiline. Switched to pregabalin, but has not received.      6. Type 2 diabetes mellitus with diabetic polyneuropathy, without long-term current use of insulin  Overview:  Complicated by peripheral neuropathy and potentially gastroparesis.  Currently on metformin and lantus.      7. Osteopenia, unspecified location  Overview:  Low risk osteopenia by bone mineral density 03/2023.      8. Prostate cancer  -     calcium carbonate (OS-CALOS) 500 mg calcium (1,250 mg) tablet; Take 2 tablets (1,000 mg total) by mouth once daily.  Dispense: 60 tablet; Refill: 11        Follow up:   Route Chart for Scheduling    Med Onc Chart Routing      Follow up with physician . [place] 12 weeks   Follow up with DONAVAN    Infusion scheduling note    Injection scheduling note    Labs CBC, CMP and PSA   Scheduling:  Preferred lab: [place]  Lab interval: every 6 weeks  10/20/23   Imaging    Pharmacy appointment    Other referrals               Treatment Plan Information   OP ABIRATERONE DAILY PREDNISONE BID   Edward Zuleta MD   Upcoming Treatment Dates - OP ABIRATERONE DAILY PREDNISONE BID    No upcoming days in selected categories.         The above information has been reviewed with the patient and all questions have been answered to their apparent satisfaction.  They understand that they can call the clinic with any questions.    Edward Zuleta

## 2023-09-08 NOTE — ASSESSMENT & PLAN NOTE
Tolerating ADT + AAP well. No longer using desvenlafaxine for hot flashes. Will monitor. Starts RT next week.  Scheduled to get ADT with Dr. Edmond next week.  - Needs to schedule genetics appt  - Con't ADT + AAP  - Safety labs in 6 weeks; FU in 12 weeks

## 2023-09-08 NOTE — PATIENT INSTRUCTIONS
Tolerating hormonal well with Abiraterone, prednisone and ADT shots. Be sure to follow up with Dr. Edmond for ADT shot next week.    Planning to start radiation next week.    To schedule genetics consultation, call She Flores  at [phone]

## 2023-09-10 PROCEDURE — 77370 RADIATION PHYSICS CONSULT: CPT | Performed by: RADIOLOGY

## 2023-09-14 PROCEDURE — 77301 RADIOTHERAPY DOSE PLAN IMRT: CPT | Mod: 26,,, | Performed by: RADIOLOGY

## 2023-09-14 PROCEDURE — 77301 PR  INTEN MOD RADIOTHER PLAN W/DOSE VOL HIST: ICD-10-PCS | Mod: 26,,, | Performed by: RADIOLOGY

## 2023-09-14 PROCEDURE — 77301 RADIOTHERAPY DOSE PLAN IMRT: CPT | Mod: TC | Performed by: RADIOLOGY

## 2023-09-15 PROCEDURE — 77338 DESIGN MLC DEVICE FOR IMRT: CPT | Mod: 26,,, | Performed by: RADIOLOGY

## 2023-09-15 PROCEDURE — 77300 PR RADIATION THERAPY,DOSIMETRY PLAN: ICD-10-PCS | Mod: 26,,, | Performed by: RADIOLOGY

## 2023-09-15 PROCEDURE — 77338 PR  MLC IMRT DESIGN & CONSTRUCTION PER IMRT PLAN: ICD-10-PCS | Mod: 26,,, | Performed by: RADIOLOGY

## 2023-09-15 PROCEDURE — 77300 RADIATION THERAPY DOSE PLAN: CPT | Mod: TC | Performed by: RADIOLOGY

## 2023-09-15 PROCEDURE — 77300 RADIATION THERAPY DOSE PLAN: CPT | Mod: 26,,, | Performed by: RADIOLOGY

## 2023-09-15 PROCEDURE — 77338 DESIGN MLC DEVICE FOR IMRT: CPT | Mod: TC | Performed by: RADIOLOGY

## 2023-09-19 PROCEDURE — 77427 RADIATION TX MANAGEMENT X5: CPT | Mod: ,,, | Performed by: RADIOLOGY

## 2023-09-19 PROCEDURE — 77385 HC IMRT, SIMPLE: CPT | Performed by: RADIOLOGY

## 2023-09-19 PROCEDURE — 77427 PR CHG RADIATION,MANGEMENT,5 TX'S: ICD-10-PCS | Mod: ,,, | Performed by: RADIOLOGY

## 2023-09-19 PROCEDURE — 77014 PR  CT GUIDANCE PLACEMENT RAD THERAPY FIELDS: ICD-10-PCS | Mod: 26,,, | Performed by: RADIOLOGY

## 2023-09-19 PROCEDURE — 77014 PR  CT GUIDANCE PLACEMENT RAD THERAPY FIELDS: CPT | Mod: 26,,, | Performed by: RADIOLOGY

## 2023-09-20 ENCOUNTER — DOCUMENTATION ONLY (OUTPATIENT)
Dept: RADIATION ONCOLOGY | Facility: CLINIC | Age: 58
End: 2023-09-20
Payer: MEDICAID

## 2023-09-20 PROCEDURE — 77014 PR  CT GUIDANCE PLACEMENT RAD THERAPY FIELDS: ICD-10-PCS | Mod: 26,,, | Performed by: RADIOLOGY

## 2023-09-20 PROCEDURE — 77014 PR  CT GUIDANCE PLACEMENT RAD THERAPY FIELDS: CPT | Mod: 26,,, | Performed by: RADIOLOGY

## 2023-09-20 PROCEDURE — 77385 HC IMRT, SIMPLE: CPT | Performed by: RADIOLOGY

## 2023-09-21 PROCEDURE — 77014 PR  CT GUIDANCE PLACEMENT RAD THERAPY FIELDS: CPT | Mod: 26,,, | Performed by: RADIOLOGY

## 2023-09-21 PROCEDURE — 77385 HC IMRT, SIMPLE: CPT | Performed by: RADIOLOGY

## 2023-09-21 PROCEDURE — 77014 PR  CT GUIDANCE PLACEMENT RAD THERAPY FIELDS: ICD-10-PCS | Mod: 26,,, | Performed by: RADIOLOGY

## 2023-09-22 PROCEDURE — 77014 PR  CT GUIDANCE PLACEMENT RAD THERAPY FIELDS: CPT | Mod: 26,,, | Performed by: RADIOLOGY

## 2023-09-22 PROCEDURE — 77385 HC IMRT, SIMPLE: CPT | Performed by: RADIOLOGY

## 2023-09-22 PROCEDURE — 77014 PR  CT GUIDANCE PLACEMENT RAD THERAPY FIELDS: ICD-10-PCS | Mod: 26,,, | Performed by: RADIOLOGY

## 2023-09-25 PROCEDURE — 77014 PR  CT GUIDANCE PLACEMENT RAD THERAPY FIELDS: CPT | Mod: 26,,, | Performed by: RADIOLOGY

## 2023-09-25 PROCEDURE — 77336 RADIATION PHYSICS CONSULT: CPT | Performed by: RADIOLOGY

## 2023-09-25 PROCEDURE — 77385 HC IMRT, SIMPLE: CPT | Performed by: RADIOLOGY

## 2023-09-25 PROCEDURE — 77014 PR  CT GUIDANCE PLACEMENT RAD THERAPY FIELDS: ICD-10-PCS | Mod: 26,,, | Performed by: RADIOLOGY

## 2023-09-26 ENCOUNTER — OFFICE VISIT (OUTPATIENT)
Dept: UROLOGY | Facility: CLINIC | Age: 58
End: 2023-09-26
Payer: MEDICAID

## 2023-09-26 ENCOUNTER — DOCUMENTATION ONLY (OUTPATIENT)
Dept: RADIATION ONCOLOGY | Facility: CLINIC | Age: 58
End: 2023-09-26
Payer: MEDICAID

## 2023-09-26 VITALS
HEART RATE: 90 BPM | SYSTOLIC BLOOD PRESSURE: 99 MMHG | HEIGHT: 70 IN | BODY MASS INDEX: 30.05 KG/M2 | DIASTOLIC BLOOD PRESSURE: 63 MMHG

## 2023-09-26 DIAGNOSIS — C61 PROSTATE CANCER: ICD-10-CM

## 2023-09-26 DIAGNOSIS — C61 PROSTATE CANCER METASTATIC TO INTRAPELVIC LYMPH NODE: Primary | ICD-10-CM

## 2023-09-26 DIAGNOSIS — C77.5 PROSTATE CANCER METASTATIC TO INTRAPELVIC LYMPH NODE: Primary | ICD-10-CM

## 2023-09-26 PROCEDURE — 99999 PR PBB SHADOW E&M-EST. PATIENT-LVL V: CPT | Mod: PBBFAC,,, | Performed by: UROLOGY

## 2023-09-26 PROCEDURE — 77014 PR  CT GUIDANCE PLACEMENT RAD THERAPY FIELDS: ICD-10-PCS | Mod: 26,,, | Performed by: RADIOLOGY

## 2023-09-26 PROCEDURE — 77014 PR  CT GUIDANCE PLACEMENT RAD THERAPY FIELDS: CPT | Mod: 26,,, | Performed by: RADIOLOGY

## 2023-09-26 PROCEDURE — 3074F SYST BP LT 130 MM HG: CPT | Mod: CPTII,,, | Performed by: UROLOGY

## 2023-09-26 PROCEDURE — 99999 PR PBB SHADOW E&M-EST. PATIENT-LVL V: ICD-10-PCS | Mod: PBBFAC,,, | Performed by: UROLOGY

## 2023-09-26 PROCEDURE — 99215 OFFICE O/P EST HI 40 MIN: CPT | Mod: PBBFAC,PO | Performed by: UROLOGY

## 2023-09-26 PROCEDURE — 3051F HG A1C>EQUAL 7.0%<8.0%: CPT | Mod: CPTII,,, | Performed by: UROLOGY

## 2023-09-26 PROCEDURE — 3074F PR MOST RECENT SYSTOLIC BLOOD PRESSURE < 130 MM HG: ICD-10-PCS | Mod: CPTII,,, | Performed by: UROLOGY

## 2023-09-26 PROCEDURE — 99215 OFFICE O/P EST HI 40 MIN: CPT | Mod: S$PBB,,, | Performed by: UROLOGY

## 2023-09-26 PROCEDURE — 1160F RVW MEDS BY RX/DR IN RCRD: CPT | Mod: CPTII,,, | Performed by: UROLOGY

## 2023-09-26 PROCEDURE — 99215 PR OFFICE/OUTPT VISIT, EST, LEVL V, 40-54 MIN: ICD-10-PCS | Mod: S$PBB,,, | Performed by: UROLOGY

## 2023-09-26 PROCEDURE — 1159F MED LIST DOCD IN RCRD: CPT | Mod: CPTII,,, | Performed by: UROLOGY

## 2023-09-26 PROCEDURE — 99999PBSHW PR PBB SHADOW TECHNICAL ONLY FILED TO HB: Mod: JZ,PBBFAC,,

## 2023-09-26 PROCEDURE — 3008F PR BODY MASS INDEX (BMI) DOCUMENTED: ICD-10-PCS | Mod: CPTII,,, | Performed by: UROLOGY

## 2023-09-26 PROCEDURE — 4010F ACE/ARB THERAPY RXD/TAKEN: CPT | Mod: CPTII,,, | Performed by: UROLOGY

## 2023-09-26 PROCEDURE — 3008F BODY MASS INDEX DOCD: CPT | Mod: CPTII,,, | Performed by: UROLOGY

## 2023-09-26 PROCEDURE — 1160F PR REVIEW ALL MEDS BY PRESCRIBER/CLIN PHARMACIST DOCUMENTED: ICD-10-PCS | Mod: CPTII,,, | Performed by: UROLOGY

## 2023-09-26 PROCEDURE — 1159F PR MEDICATION LIST DOCUMENTED IN MEDICAL RECORD: ICD-10-PCS | Mod: CPTII,,, | Performed by: UROLOGY

## 2023-09-26 PROCEDURE — 77427 RADIATION TX MANAGEMENT X5: CPT | Mod: ,,, | Performed by: RADIOLOGY

## 2023-09-26 PROCEDURE — 99999PBSHW PR PBB SHADOW TECHNICAL ONLY FILED TO HB: ICD-10-PCS | Mod: JZ,PBBFAC,,

## 2023-09-26 PROCEDURE — 77385 HC IMRT, SIMPLE: CPT | Performed by: RADIOLOGY

## 2023-09-26 PROCEDURE — 3078F PR MOST RECENT DIASTOLIC BLOOD PRESSURE < 80 MM HG: ICD-10-PCS | Mod: CPTII,,, | Performed by: UROLOGY

## 2023-09-26 PROCEDURE — 96402 CHEMO HORMON ANTINEOPL SQ/IM: CPT | Mod: PBBFAC,PO

## 2023-09-26 PROCEDURE — 4010F PR ACE/ARB THEARPY RXD/TAKEN: ICD-10-PCS | Mod: CPTII,,, | Performed by: UROLOGY

## 2023-09-26 PROCEDURE — 3051F PR MOST RECENT HEMOGLOBIN A1C LEVEL 7.0 - < 8.0%: ICD-10-PCS | Mod: CPTII,,, | Performed by: UROLOGY

## 2023-09-26 PROCEDURE — 77427 PR CHG RADIATION,MANGEMENT,5 TX'S: ICD-10-PCS | Mod: ,,, | Performed by: RADIOLOGY

## 2023-09-26 PROCEDURE — 3078F DIAST BP <80 MM HG: CPT | Mod: CPTII,,, | Performed by: UROLOGY

## 2023-09-26 RX ORDER — CALCIUM CARBONATE 500(1250)
2 TABLET ORAL DAILY
Qty: 60 TABLET | Refills: 11 | Status: SHIPPED | OUTPATIENT
Start: 2023-09-26 | End: 2023-11-29 | Stop reason: SDUPTHER

## 2023-09-26 RX ADMIN — TRIPTORELIN PAMOATE 11.25 MG: KIT at 01:09

## 2023-09-26 NOTE — TELEPHONE ENCOUNTER
----- Message from Spencer Lucero sent at 9/26/2023  3:26 PM CDT -----  Regarding: Advice  Contact: [phone]  Patient is calling to speak with someone in office about sending medication calcium carbonate (OS-CALOS) 500 mg calcium (1,250 mg) tablet to [facility] pharmacy. Please contact pt    PHARMACY: [id number] - [place], [place] - [address] AT [facility] [address]

## 2023-09-26 NOTE — PLAN OF CARE
Day 6 of outpatient radiation to the prostate bed. Doing well. No dysuria or hematuria. Nocturia x 1. One episode of diarrhea.

## 2023-09-26 NOTE — PROGRESS NOTES
Last Monday  Subjective:      Patient ID: Mauro Benitez is a 57 y.o. male.    Chief Complaint: trelstar - 3 month     Mr. Benitez is a 49-year-old gentleman with a history of recurrent prostate cancer metastatic to the intra-abdominal and intrapelvic lymph nodes.  The patient has been on hormonal blockade for over a year.  He was started on amiodarone and prednisone in February when PSA spiked.  The patient was imaged found to have metastatic recurrence and was sent for evaluation for radiation therapy.  The patient has responded well to his medication and is on his 5th dose of radiation today.  The patient has 4 more weeks of therapy.  Patient will follow-up in 3 months for repeat injection.  Depending on how long the team recommends him staying on androgen blockade we will possibly change him to a six-month dose.    Follow-up  This is a recurrent (Undergoing active radiation therapy and hormonal blockade for recurrent prostate cancer.) problem. The current episode started more than 1 year ago. The problem occurs constantly. The problem has been gradually improving. Pertinent negatives include no abdominal pain, anorexia, arthralgias, change in bowel habit, chest pain, chills, congestion, coughing, diaphoresis, fatigue, fever, headaches, joint swelling, myalgias, nausea, neck pain, numbness, rash, sore throat, swollen glands, urinary symptoms, vertigo, visual change, vomiting or weakness. Nothing aggravates the symptoms. Treatments tried: I androgen therapy with steroids and total androgen Blockade. The treatment provided significant relief.     Review of Systems   Constitutional:  Negative for activity change, appetite change, chills, diaphoresis, fatigue, fever and unexpected weight change.   HENT:  Negative for congestion, hearing loss, sinus pressure, sore throat and trouble swallowing.    Eyes:  Negative for photophobia, pain, discharge and visual disturbance.   Respiratory:  Negative for apnea, cough and  shortness of breath.    Cardiovascular:  Negative for chest pain, palpitations and leg swelling.   Gastrointestinal:  Negative for abdominal distention, abdominal pain, anal bleeding, anorexia, blood in stool, change in bowel habit, constipation, diarrhea, nausea, rectal pain and vomiting.   Endocrine: Negative for cold intolerance, heat intolerance, polydipsia, polyphagia and polyuria.   Genitourinary:  Negative for decreased urine volume, difficulty urinating, dysuria, enuresis, flank pain, frequency, genital sores, hematuria, penile discharge, penile pain, penile swelling, scrotal swelling, testicular pain and urgency.   Musculoskeletal:  Negative for arthralgias, back pain, joint swelling, myalgias and neck pain.   Skin:  Negative for color change, pallor, rash and wound.   Allergic/Immunologic: Negative for environmental allergies, food allergies and immunocompromised state.   Neurological:  Negative for dizziness, vertigo, seizures, weakness, numbness and headaches.   Hematological:  Negative for adenopathy. Does not bruise/bleed easily.   Psychiatric/Behavioral: Negative.        Objective:     Physical Exam  Vitals and nursing note reviewed.   Constitutional:       Appearance: Normal appearance. He is well-developed.   HENT:      Head: Normocephalic.      Right Ear: External ear normal.      Left Ear: External ear normal.      Nose: Nose normal.      Mouth/Throat:      Mouth: Mucous membranes are moist.      Pharynx: Oropharynx is clear.   Eyes:      Extraocular Movements: Extraocular movements intact.      Conjunctiva/sclera: Conjunctivae normal.      Pupils: Pupils are equal, round, and reactive to light.   Cardiovascular:      Rate and Rhythm: Normal rate and regular rhythm.      Heart sounds: Normal heart sounds.   Pulmonary:      Effort: Pulmonary effort is normal.      Breath sounds: Normal breath sounds.   Abdominal:      General: Bowel sounds are normal.      Palpations: Abdomen is soft.       Tenderness: There is no right CVA tenderness or left CVA tenderness.   Genitourinary:     Penis: Normal.       Testes: Normal.   Musculoskeletal:         General: Normal range of motion.      Cervical back: Normal range of motion and neck supple.      Right lower leg: No edema.      Left lower leg: No edema.   Skin:     General: Skin is warm and dry.      Capillary Refill: Capillary refill takes less than 2 seconds.      Findings: No lesion or rash.   Neurological:      General: No focal deficit present.      Mental Status: He is alert and oriented to person, place, and time.      Gait: Gait normal.      Deep Tendon Reflexes: Reflexes are normal and symmetric. Reflexes normal.   Psychiatric:         Mood and Affect: Mood normal.         Behavior: Behavior normal.         Thought Content: Thought content normal.         Judgment: Judgment normal.        Assessment:      1. Prostate cancer metastatic to intrapelvic lymph node      Plan:     Patient Instructions   Patient to follow-up in 3 months with Trelstar injection.  PSA and testosterone in 3 months

## 2023-09-27 PROCEDURE — 77385 HC IMRT, SIMPLE: CPT | Performed by: RADIOLOGY

## 2023-09-27 PROCEDURE — 77014 PR  CT GUIDANCE PLACEMENT RAD THERAPY FIELDS: ICD-10-PCS | Mod: 26,,, | Performed by: RADIOLOGY

## 2023-09-27 PROCEDURE — 77014 PR  CT GUIDANCE PLACEMENT RAD THERAPY FIELDS: CPT | Mod: 26,,, | Performed by: RADIOLOGY

## 2023-09-28 ENCOUNTER — TELEPHONE (OUTPATIENT)
Dept: RADIATION ONCOLOGY | Facility: CLINIC | Age: 58
End: 2023-09-28
Payer: MEDICAID

## 2023-09-28 PROCEDURE — 77014 PR  CT GUIDANCE PLACEMENT RAD THERAPY FIELDS: ICD-10-PCS | Mod: 26,,, | Performed by: RADIOLOGY

## 2023-09-28 PROCEDURE — 77385 HC IMRT, SIMPLE: CPT | Performed by: RADIOLOGY

## 2023-09-28 PROCEDURE — 77014 PR  CT GUIDANCE PLACEMENT RAD THERAPY FIELDS: CPT | Mod: 26,,, | Performed by: RADIOLOGY

## 2023-09-28 NOTE — TELEPHONE ENCOUNTER
----- Message from Karri Ruggiero sent at 9/28/2023 12:46 PM CDT -----  Pt's wife called and said that he is having bad hip pain after his radiation today and she was wondering if she should bring him here or what they could do.    Please call back at [phone]

## 2023-09-29 PROCEDURE — 77014 PR  CT GUIDANCE PLACEMENT RAD THERAPY FIELDS: CPT | Mod: 26,,, | Performed by: RADIOLOGY

## 2023-09-29 PROCEDURE — 77014 PR  CT GUIDANCE PLACEMENT RAD THERAPY FIELDS: ICD-10-PCS | Mod: 26,,, | Performed by: RADIOLOGY

## 2023-09-29 PROCEDURE — 77385 HC IMRT, SIMPLE: CPT | Performed by: RADIOLOGY

## 2023-10-02 ENCOUNTER — HOSPITAL ENCOUNTER (OUTPATIENT)
Dept: RADIATION THERAPY | Facility: HOSPITAL | Age: 58
Discharge: HOME | End: 2023-10-02
Attending: RADIOLOGY
Payer: MEDICAID

## 2023-10-02 PROCEDURE — 77014 PR  CT GUIDANCE PLACEMENT RAD THERAPY FIELDS: CPT | Mod: 26,,, | Performed by: RADIOLOGY

## 2023-10-02 PROCEDURE — 77014 PR  CT GUIDANCE PLACEMENT RAD THERAPY FIELDS: ICD-10-PCS | Mod: 26,,, | Performed by: RADIOLOGY

## 2023-10-02 PROCEDURE — 77385 HC IMRT, SIMPLE: CPT | Performed by: RADIOLOGY

## 2023-10-02 PROCEDURE — 77336 RADIATION PHYSICS CONSULT: CPT | Performed by: RADIOLOGY

## 2023-10-03 PROCEDURE — 77014 PR  CT GUIDANCE PLACEMENT RAD THERAPY FIELDS: ICD-10-PCS | Mod: 26,,, | Performed by: RADIOLOGY

## 2023-10-03 PROCEDURE — 77427 RADIATION TX MANAGEMENT X5: CPT | Mod: ,,, | Performed by: RADIOLOGY

## 2023-10-03 PROCEDURE — 77014 PR  CT GUIDANCE PLACEMENT RAD THERAPY FIELDS: CPT | Mod: 26,,, | Performed by: RADIOLOGY

## 2023-10-03 PROCEDURE — 77427 PR CHG RADIATION,MANGEMENT,5 TX'S: ICD-10-PCS | Mod: ,,, | Performed by: RADIOLOGY

## 2023-10-03 PROCEDURE — 77385 HC IMRT, SIMPLE: CPT | Performed by: RADIOLOGY

## 2023-10-04 ENCOUNTER — DOCUMENTATION ONLY (OUTPATIENT)
Dept: RADIATION ONCOLOGY | Facility: CLINIC | Age: 58
End: 2023-10-04
Payer: MEDICAID

## 2023-10-04 PROCEDURE — 77014 PR  CT GUIDANCE PLACEMENT RAD THERAPY FIELDS: ICD-10-PCS | Mod: 26,,, | Performed by: RADIOLOGY

## 2023-10-04 PROCEDURE — 77385 HC IMRT, SIMPLE: CPT | Performed by: RADIOLOGY

## 2023-10-04 PROCEDURE — 77014 PR  CT GUIDANCE PLACEMENT RAD THERAPY FIELDS: CPT | Mod: 26,,, | Performed by: RADIOLOGY

## 2023-10-04 NOTE — PLAN OF CARE
Day 12 of outpatient radiation to the prostate bed. Doing well. No dysuria or hematuria. Nocturia x 1. Intermittent episodes of diarrhea, using Imodium.

## 2023-10-05 PROCEDURE — 77014 PR  CT GUIDANCE PLACEMENT RAD THERAPY FIELDS: CPT | Mod: 26,,, | Performed by: RADIOLOGY

## 2023-10-05 PROCEDURE — 77385 HC IMRT, SIMPLE: CPT | Performed by: RADIOLOGY

## 2023-10-05 PROCEDURE — 77014 PR  CT GUIDANCE PLACEMENT RAD THERAPY FIELDS: ICD-10-PCS | Mod: 26,,, | Performed by: RADIOLOGY

## 2023-10-06 PROCEDURE — 77014 PR  CT GUIDANCE PLACEMENT RAD THERAPY FIELDS: ICD-10-PCS | Mod: 26,,, | Performed by: RADIOLOGY

## 2023-10-06 PROCEDURE — 77385 HC IMRT, SIMPLE: CPT | Performed by: RADIOLOGY

## 2023-10-06 PROCEDURE — 77014 PR  CT GUIDANCE PLACEMENT RAD THERAPY FIELDS: CPT | Mod: 26,,, | Performed by: RADIOLOGY

## 2023-10-09 PROCEDURE — 77385 HC IMRT, SIMPLE: CPT | Performed by: RADIOLOGY

## 2023-10-09 PROCEDURE — 77336 RADIATION PHYSICS CONSULT: CPT | Performed by: RADIOLOGY

## 2023-10-09 PROCEDURE — 77014 PR  CT GUIDANCE PLACEMENT RAD THERAPY FIELDS: ICD-10-PCS | Mod: 26,,, | Performed by: RADIOLOGY

## 2023-10-09 PROCEDURE — 77014 PR  CT GUIDANCE PLACEMENT RAD THERAPY FIELDS: CPT | Mod: 26,,, | Performed by: RADIOLOGY

## 2023-10-10 PROCEDURE — 77427 PR CHG RADIATION,MANGEMENT,5 TX'S: ICD-10-PCS | Mod: ,,, | Performed by: RADIOLOGY

## 2023-10-10 PROCEDURE — 77014 PR  CT GUIDANCE PLACEMENT RAD THERAPY FIELDS: ICD-10-PCS | Mod: 26,,, | Performed by: RADIOLOGY

## 2023-10-10 PROCEDURE — 77385 HC IMRT, SIMPLE: CPT | Performed by: RADIOLOGY

## 2023-10-10 PROCEDURE — 77427 RADIATION TX MANAGEMENT X5: CPT | Mod: ,,, | Performed by: RADIOLOGY

## 2023-10-10 PROCEDURE — 77014 PR  CT GUIDANCE PLACEMENT RAD THERAPY FIELDS: CPT | Mod: 26,,, | Performed by: RADIOLOGY

## 2023-10-11 ENCOUNTER — DOCUMENTATION ONLY (OUTPATIENT)
Dept: RADIATION ONCOLOGY | Facility: CLINIC | Age: 58
End: 2023-10-11
Payer: MEDICAID

## 2023-10-11 PROCEDURE — 77385 HC IMRT, SIMPLE: CPT | Performed by: RADIOLOGY

## 2023-10-11 PROCEDURE — 77014 PR  CT GUIDANCE PLACEMENT RAD THERAPY FIELDS: ICD-10-PCS | Mod: 26,,, | Performed by: RADIOLOGY

## 2023-10-11 PROCEDURE — 77014 PR  CT GUIDANCE PLACEMENT RAD THERAPY FIELDS: CPT | Mod: 26,,, | Performed by: RADIOLOGY

## 2023-10-11 NOTE — PLAN OF CARE
Day 17 of outpatient radiation to the prostate bed. Overall doing well. No dysuria or hematuria. Nocturia x 1. Still with watery stools, using Imodium-AD prn.    Topical Ketoconazole Counseling: Patient counseled that this medication may cause skin irritation or allergic reactions.  In the event of skin irritation, the patient was advised to reduce the amount of the drug applied or use it less frequently.   The patient verbalized understanding of the proper use and possible adverse effects of ketoconazole.  All of the patient's questions and concerns were addressed.

## 2023-10-12 PROCEDURE — 77014 PR  CT GUIDANCE PLACEMENT RAD THERAPY FIELDS: CPT | Mod: 26,,, | Performed by: RADIOLOGY

## 2023-10-12 PROCEDURE — 77385 HC IMRT, SIMPLE: CPT | Performed by: RADIOLOGY

## 2023-10-12 PROCEDURE — 77014 PR  CT GUIDANCE PLACEMENT RAD THERAPY FIELDS: ICD-10-PCS | Mod: 26,,, | Performed by: RADIOLOGY

## 2023-10-13 PROCEDURE — 77385 HC IMRT, SIMPLE: CPT | Performed by: RADIOLOGY

## 2023-10-13 PROCEDURE — 77014 PR  CT GUIDANCE PLACEMENT RAD THERAPY FIELDS: ICD-10-PCS | Mod: 26,,, | Performed by: RADIOLOGY

## 2023-10-13 PROCEDURE — 77014 PR  CT GUIDANCE PLACEMENT RAD THERAPY FIELDS: CPT | Mod: 26,,, | Performed by: RADIOLOGY

## 2023-10-16 PROCEDURE — 77336 RADIATION PHYSICS CONSULT: CPT | Performed by: RADIOLOGY

## 2023-10-16 PROCEDURE — 77014 PR  CT GUIDANCE PLACEMENT RAD THERAPY FIELDS: ICD-10-PCS | Mod: 26,,, | Performed by: RADIOLOGY

## 2023-10-16 PROCEDURE — 77014 PR  CT GUIDANCE PLACEMENT RAD THERAPY FIELDS: CPT | Mod: 26,,, | Performed by: RADIOLOGY

## 2023-10-16 PROCEDURE — 77385 HC IMRT, SIMPLE: CPT | Performed by: RADIOLOGY

## 2023-10-17 PROCEDURE — 77014 PR  CT GUIDANCE PLACEMENT RAD THERAPY FIELDS: CPT | Mod: 26,,, | Performed by: RADIOLOGY

## 2023-10-17 PROCEDURE — 77427 RADIATION TX MANAGEMENT X5: CPT | Mod: ,,, | Performed by: RADIOLOGY

## 2023-10-17 PROCEDURE — 77014 PR  CT GUIDANCE PLACEMENT RAD THERAPY FIELDS: ICD-10-PCS | Mod: 26,,, | Performed by: RADIOLOGY

## 2023-10-17 PROCEDURE — 77427 PR CHG RADIATION,MANGEMENT,5 TX'S: ICD-10-PCS | Mod: ,,, | Performed by: RADIOLOGY

## 2023-10-17 PROCEDURE — 77385 HC IMRT, SIMPLE: CPT | Performed by: RADIOLOGY

## 2023-10-18 ENCOUNTER — DOCUMENTATION ONLY (OUTPATIENT)
Dept: RADIATION ONCOLOGY | Facility: CLINIC | Age: 58
End: 2023-10-18
Payer: MEDICAID

## 2023-10-18 PROCEDURE — 77385 HC IMRT, SIMPLE: CPT | Performed by: RADIOLOGY

## 2023-10-18 PROCEDURE — 77014 PR  CT GUIDANCE PLACEMENT RAD THERAPY FIELDS: ICD-10-PCS | Mod: 26,,, | Performed by: RADIOLOGY

## 2023-10-18 PROCEDURE — 77014 PR  CT GUIDANCE PLACEMENT RAD THERAPY FIELDS: CPT | Mod: 26,,, | Performed by: RADIOLOGY

## 2023-10-18 NOTE — PLAN OF CARE
Day 22 of outpatient radiation to the prostate bed. Overall doing well. No dysuria or hematuria. Nocturia x 1. Still with intermittent episodes of diarrhea, using Imodium-AD prn.

## 2023-10-19 PROCEDURE — 77385 HC IMRT, SIMPLE: CPT | Performed by: RADIOLOGY

## 2023-10-19 PROCEDURE — 77014 PR  CT GUIDANCE PLACEMENT RAD THERAPY FIELDS: CPT | Mod: 26,,, | Performed by: RADIOLOGY

## 2023-10-19 PROCEDURE — 77014 PR  CT GUIDANCE PLACEMENT RAD THERAPY FIELDS: ICD-10-PCS | Mod: 26,,, | Performed by: RADIOLOGY

## 2023-10-20 ENCOUNTER — LAB VISIT (OUTPATIENT)
Dept: LAB | Facility: HOSPITAL | Age: 58
End: 2023-10-20
Attending: HOSPITALIST
Payer: MEDICAID

## 2023-10-20 DIAGNOSIS — Z79.899 LONG TERM CURRENT USE OF THERAPEUTIC DRUG: ICD-10-CM

## 2023-10-20 DIAGNOSIS — C61 PROSTATE CANCER: ICD-10-CM

## 2023-10-20 LAB
ALBUMIN SERPL BCP-MCNC: 3.7 G/DL (ref 3.5–5.2)
ALP SERPL-CCNC: 62 U/L (ref 55–135)
ALT SERPL W/O P-5'-P-CCNC: 19 U/L (ref 10–44)
ANION GAP SERPL CALC-SCNC: 7 MMOL/L (ref 8–16)
AST SERPL-CCNC: 15 U/L (ref 10–40)
BILIRUB SERPL-MCNC: 0.6 MG/DL (ref 0.1–1)
BUN SERPL-MCNC: 18 MG/DL (ref 6–20)
CALCIUM SERPL-MCNC: 9.3 MG/DL (ref 8.7–10.5)
CHLORIDE SERPL-SCNC: 110 MMOL/L (ref 95–110)
CO2 SERPL-SCNC: 21 MMOL/L (ref 23–29)
COMPLEXED PSA SERPL-MCNC: <0.01 NG/ML (ref 0–4)
CREAT SERPL-MCNC: 0.9 MG/DL (ref 0.5–1.4)
ERYTHROCYTE [DISTWIDTH] IN BLOOD BY AUTOMATED COUNT: 16.3 % (ref 11.5–14.5)
EST. GFR  (NO RACE VARIABLE): >60 ML/MIN/1.73 M^2
GLUCOSE SERPL-MCNC: 182 MG/DL (ref 70–110)
HCT VFR BLD AUTO: 33.7 % (ref 40–54)
HGB BLD-MCNC: 10.5 G/DL (ref 14–18)
IMM GRANULOCYTES # BLD AUTO: 0.02 K/UL (ref 0–0.04)
MCH RBC QN AUTO: 30 PG (ref 27–31)
MCHC RBC AUTO-ENTMCNC: 31.2 G/DL (ref 32–36)
MCV RBC AUTO: 96 FL (ref 82–98)
NEUTROPHILS # BLD AUTO: 3.4 K/UL (ref 1.8–7.7)
PLATELET # BLD AUTO: 228 K/UL (ref 150–450)
PMV BLD AUTO: 9.5 FL (ref 9.2–12.9)
POTASSIUM SERPL-SCNC: 4.9 MMOL/L (ref 3.5–5.1)
PROT SERPL-MCNC: 6.8 G/DL (ref 6–8.4)
RBC # BLD AUTO: 3.5 M/UL (ref 4.6–6.2)
SODIUM SERPL-SCNC: 138 MMOL/L (ref 136–145)
WBC # BLD AUTO: 4.33 K/UL (ref 3.9–12.7)

## 2023-10-20 PROCEDURE — 82652 VIT D 1 25-DIHYDROXY: CPT | Performed by: HOSPITALIST

## 2023-10-20 PROCEDURE — 77014 PR  CT GUIDANCE PLACEMENT RAD THERAPY FIELDS: ICD-10-PCS | Mod: 26,,, | Performed by: RADIOLOGY

## 2023-10-20 PROCEDURE — 77014 PR  CT GUIDANCE PLACEMENT RAD THERAPY FIELDS: CPT | Mod: 26,,, | Performed by: RADIOLOGY

## 2023-10-20 PROCEDURE — 77385 HC IMRT, SIMPLE: CPT | Performed by: RADIOLOGY

## 2023-10-20 PROCEDURE — 80053 COMPREHEN METABOLIC PANEL: CPT | Performed by: HOSPITALIST

## 2023-10-20 PROCEDURE — 85027 COMPLETE CBC AUTOMATED: CPT | Performed by: HOSPITALIST

## 2023-10-20 PROCEDURE — 36415 COLL VENOUS BLD VENIPUNCTURE: CPT | Performed by: HOSPITALIST

## 2023-10-20 PROCEDURE — 84153 ASSAY OF PSA TOTAL: CPT | Performed by: HOSPITALIST

## 2023-10-23 LAB — 1,25(OH)2D3 SERPL-MCNC: 44 PG/ML (ref 20–79)

## 2023-10-23 PROCEDURE — 77336 RADIATION PHYSICS CONSULT: CPT | Performed by: RADIOLOGY

## 2023-10-23 PROCEDURE — 77014 PR  CT GUIDANCE PLACEMENT RAD THERAPY FIELDS: CPT | Mod: 26,,, | Performed by: RADIOLOGY

## 2023-10-23 PROCEDURE — 77014 PR  CT GUIDANCE PLACEMENT RAD THERAPY FIELDS: ICD-10-PCS | Mod: 26,,, | Performed by: RADIOLOGY

## 2023-10-23 PROCEDURE — 77385 HC IMRT, SIMPLE: CPT | Performed by: RADIOLOGY

## 2023-10-24 PROCEDURE — 77427 PR CHG RADIATION,MANGEMENT,5 TX'S: ICD-10-PCS | Mod: ,,, | Performed by: RADIOLOGY

## 2023-10-24 PROCEDURE — 77014 PR  CT GUIDANCE PLACEMENT RAD THERAPY FIELDS: ICD-10-PCS | Mod: 26,,, | Performed by: RADIOLOGY

## 2023-10-24 PROCEDURE — 77427 RADIATION TX MANAGEMENT X5: CPT | Mod: ,,, | Performed by: RADIOLOGY

## 2023-10-24 PROCEDURE — 77014 PR  CT GUIDANCE PLACEMENT RAD THERAPY FIELDS: CPT | Mod: 26,,, | Performed by: RADIOLOGY

## 2023-10-24 PROCEDURE — 77385 HC IMRT, SIMPLE: CPT | Performed by: RADIOLOGY

## 2023-10-25 ENCOUNTER — DOCUMENTATION ONLY (OUTPATIENT)
Dept: RADIATION ONCOLOGY | Facility: CLINIC | Age: 58
End: 2023-10-25
Payer: MEDICAID

## 2023-10-25 PROCEDURE — 77385 HC IMRT, SIMPLE: CPT | Performed by: RADIOLOGY

## 2023-10-25 PROCEDURE — 77014 PR  CT GUIDANCE PLACEMENT RAD THERAPY FIELDS: CPT | Mod: 26,,, | Performed by: RADIOLOGY

## 2023-10-25 PROCEDURE — 77014 PR  CT GUIDANCE PLACEMENT RAD THERAPY FIELDS: ICD-10-PCS | Mod: 26,,, | Performed by: RADIOLOGY

## 2023-10-26 PROCEDURE — 77014 PR  CT GUIDANCE PLACEMENT RAD THERAPY FIELDS: ICD-10-PCS | Mod: 26,,, | Performed by: RADIOLOGY

## 2023-10-26 PROCEDURE — 77014 PR  CT GUIDANCE PLACEMENT RAD THERAPY FIELDS: CPT | Mod: 26,,, | Performed by: RADIOLOGY

## 2023-10-26 PROCEDURE — 77385 HC IMRT, SIMPLE: CPT | Performed by: RADIOLOGY

## 2023-10-26 NOTE — PLAN OF CARE
Day 27 of outpatient radiation to the prostate bed. Doing well. No dysuria or hematuria. Nocturia x 1. Still with watery stools, using Imodium-AD prn.

## 2023-10-27 ENCOUNTER — DOCUMENTATION ONLY (OUTPATIENT)
Dept: RADIATION ONCOLOGY | Facility: CLINIC | Age: 58
End: 2023-10-27
Payer: MEDICAID

## 2023-10-27 PROCEDURE — 77336 RADIATION PHYSICS CONSULT: CPT | Performed by: RADIOLOGY

## 2023-10-27 NOTE — PLAN OF CARE
Completed 28/28 of patient radiation to the prostate bed on October 26th. Tolerated therapy well. RTC in 3 mos.

## 2023-11-16 ENCOUNTER — TELEPHONE (OUTPATIENT)
Dept: HEMATOLOGY/ONCOLOGY | Facility: CLINIC | Age: 58
End: 2023-11-16
Payer: MEDICAID

## 2023-11-16 NOTE — TELEPHONE ENCOUNTER
----- Message from She Arrington sent at 11/16/2023  8:07 AM CST -----  Regarding: Patient advice  Contact: Gretel [phone]            Name of Caller: Gretel stearns's spouse     Contact Preference: [phone]    Nature of Call:   Called states he has diarrhea, stomach pains and no appetite would like advise

## 2023-11-16 NOTE — TELEPHONE ENCOUNTER
"I spoke to patients wife, Gretel. She said he has vomited 3 times, his diarrhea is a "little better" but not completely better. She said he has stomach pains off and on but consistently. He has no appetite, he is afraid to eat in case it makes his stomach hurt. She said he's only eating a small amount about one time a day. She's asking if it could be from the radiation treatments? I told her it's possible. She said he has been taking the Immodium when she reminds him, nothing else. I told her she could try the ondansetron up to 3 times a day to see if that would help. I told her I would let Dr. Zuleta know what was going on and let them know if he had any other suggestions.   "

## 2023-11-29 ENCOUNTER — HOSPITAL ENCOUNTER (EMERGENCY)
Facility: HOSPITAL | Age: 58
Discharge: HOME | End: 2023-11-29
Attending: EMERGENCY MEDICINE
Payer: MEDICAID

## 2023-11-29 ENCOUNTER — OFFICE VISIT (OUTPATIENT)
Dept: HEMATOLOGY/ONCOLOGY | Facility: CLINIC | Age: 58
End: 2023-11-29
Payer: MEDICAID

## 2023-11-29 ENCOUNTER — TELEPHONE (OUTPATIENT)
Dept: GASTROENTEROLOGY | Facility: CLINIC | Age: 58
End: 2023-11-29
Payer: MEDICAID

## 2023-11-29 VITALS
DIASTOLIC BLOOD PRESSURE: 84 MMHG | RESPIRATION RATE: 17 BRPM | HEART RATE: 88 BPM | OXYGEN SATURATION: 99 % | WEIGHT: 200 LBS | TEMPERATURE: 99 F | SYSTOLIC BLOOD PRESSURE: 144 MMHG | BODY MASS INDEX: 27.89 KG/M2

## 2023-11-29 VITALS
HEIGHT: 71 IN | BODY MASS INDEX: 27.4 KG/M2 | HEART RATE: 78 BPM | OXYGEN SATURATION: 98 % | DIASTOLIC BLOOD PRESSURE: 77 MMHG | RESPIRATION RATE: 18 BRPM | SYSTOLIC BLOOD PRESSURE: 122 MMHG | WEIGHT: 195.69 LBS

## 2023-11-29 DIAGNOSIS — E86.0 DEHYDRATION: Primary | ICD-10-CM

## 2023-11-29 DIAGNOSIS — E11.43 GASTROPARESIS DUE TO DM: ICD-10-CM

## 2023-11-29 DIAGNOSIS — C61 PROSTATE CANCER: ICD-10-CM

## 2023-11-29 DIAGNOSIS — N17.9 AKI (ACUTE KIDNEY INJURY): ICD-10-CM

## 2023-11-29 DIAGNOSIS — C61 PROSTATE CANCER METASTATIC TO INTRAPELVIC LYMPH NODE: ICD-10-CM

## 2023-11-29 DIAGNOSIS — Z79.818 ANDROGEN DEPRIVATION THERAPY: Primary | ICD-10-CM

## 2023-11-29 DIAGNOSIS — K31.84 GASTROPARESIS DUE TO DM: ICD-10-CM

## 2023-11-29 DIAGNOSIS — C77.5 PROSTATE CANCER METASTATIC TO INTRAPELVIC LYMPH NODE: ICD-10-CM

## 2023-11-29 LAB
BILIRUB UR QL STRIP: NEGATIVE
CLARITY UR REFRACT.AUTO: CLEAR
COLOR UR AUTO: YELLOW
GLUCOSE UR QL STRIP: NEGATIVE
HGB UR QL STRIP: NEGATIVE
KETONES UR QL STRIP: ABNORMAL
LEUKOCYTE ESTERASE UR QL STRIP: NEGATIVE
MAGNESIUM SERPL-MCNC: 1.5 MG/DL (ref 1.6–2.6)
NITRITE UR QL STRIP: NEGATIVE
PH UR STRIP: 5 [PH] (ref 5–8)
PHOSPHATE SERPL-MCNC: 3.1 MG/DL (ref 2.7–4.5)
PROT UR QL STRIP: NEGATIVE
SP GR UR STRIP: 1.01 (ref 1–1.03)
URATE SERPL-MCNC: 4.8 MG/DL (ref 3.4–7)
URN SPEC COLLECT METH UR: ABNORMAL

## 2023-11-29 PROCEDURE — 63600175 PHARM REV CODE 636 W HCPCS: Performed by: EMERGENCY MEDICINE

## 2023-11-29 PROCEDURE — 96375 TX/PRO/DX INJ NEW DRUG ADDON: CPT

## 2023-11-29 PROCEDURE — 93010 ELECTROCARDIOGRAM REPORT: CPT | Mod: ,,, | Performed by: INTERNAL MEDICINE

## 2023-11-29 PROCEDURE — 99215 OFFICE O/P EST HI 40 MIN: CPT | Mod: S$PBB,,, | Performed by: HOSPITALIST

## 2023-11-29 PROCEDURE — 96361 HYDRATE IV INFUSION ADD-ON: CPT

## 2023-11-29 PROCEDURE — 3051F HG A1C>EQUAL 7.0%<8.0%: CPT | Mod: CPTII,,, | Performed by: HOSPITALIST

## 2023-11-29 PROCEDURE — 96365 THER/PROPH/DIAG IV INF INIT: CPT

## 2023-11-29 PROCEDURE — 99215 PR OFFICE/OUTPT VISIT, EST, LEVL V, 40-54 MIN: ICD-10-PCS | Mod: S$PBB,,, | Performed by: HOSPITALIST

## 2023-11-29 PROCEDURE — 81003 URINALYSIS AUTO W/O SCOPE: CPT | Performed by: EMERGENCY MEDICINE

## 2023-11-29 PROCEDURE — 3008F PR BODY MASS INDEX (BMI) DOCUMENTED: ICD-10-PCS | Mod: CPTII,,, | Performed by: HOSPITALIST

## 2023-11-29 PROCEDURE — 99284 EMERGENCY DEPT VISIT MOD MDM: CPT | Mod: 25,27

## 2023-11-29 PROCEDURE — 3078F PR MOST RECENT DIASTOLIC BLOOD PRESSURE < 80 MM HG: ICD-10-PCS | Mod: CPTII,,, | Performed by: HOSPITALIST

## 2023-11-29 PROCEDURE — 99213 OFFICE O/P EST LOW 20 MIN: CPT | Mod: PBBFAC,25 | Performed by: HOSPITALIST

## 2023-11-29 PROCEDURE — 3051F PR MOST RECENT HEMOGLOBIN A1C LEVEL 7.0 - < 8.0%: ICD-10-PCS | Mod: CPTII,,, | Performed by: HOSPITALIST

## 2023-11-29 PROCEDURE — 99999 PR PBB SHADOW E&M-EST. PATIENT-LVL III: ICD-10-PCS | Mod: PBBFAC,,, | Performed by: HOSPITALIST

## 2023-11-29 PROCEDURE — 93005 ELECTROCARDIOGRAM TRACING: CPT

## 2023-11-29 PROCEDURE — 3074F SYST BP LT 130 MM HG: CPT | Mod: CPTII,,, | Performed by: HOSPITALIST

## 2023-11-29 PROCEDURE — 3078F DIAST BP <80 MM HG: CPT | Mod: CPTII,,, | Performed by: HOSPITALIST

## 2023-11-29 PROCEDURE — 84550 ASSAY OF BLOOD/URIC ACID: CPT | Performed by: EMERGENCY MEDICINE

## 2023-11-29 PROCEDURE — 99999 PR PBB SHADOW E&M-EST. PATIENT-LVL III: CPT | Mod: PBBFAC,,, | Performed by: HOSPITALIST

## 2023-11-29 PROCEDURE — 3074F PR MOST RECENT SYSTOLIC BLOOD PRESSURE < 130 MM HG: ICD-10-PCS | Mod: CPTII,,, | Performed by: HOSPITALIST

## 2023-11-29 PROCEDURE — 96366 THER/PROPH/DIAG IV INF ADDON: CPT

## 2023-11-29 PROCEDURE — 83735 ASSAY OF MAGNESIUM: CPT | Performed by: EMERGENCY MEDICINE

## 2023-11-29 PROCEDURE — 4010F ACE/ARB THERAPY RXD/TAKEN: CPT | Mod: CPTII,,, | Performed by: HOSPITALIST

## 2023-11-29 PROCEDURE — 3008F BODY MASS INDEX DOCD: CPT | Mod: CPTII,,, | Performed by: HOSPITALIST

## 2023-11-29 PROCEDURE — 84100 ASSAY OF PHOSPHORUS: CPT | Performed by: EMERGENCY MEDICINE

## 2023-11-29 PROCEDURE — 4010F PR ACE/ARB THEARPY RXD/TAKEN: ICD-10-PCS | Mod: CPTII,,, | Performed by: HOSPITALIST

## 2023-11-29 PROCEDURE — 93010 EKG 12-LEAD: ICD-10-PCS | Mod: ,,, | Performed by: INTERNAL MEDICINE

## 2023-11-29 RX ORDER — ONDANSETRON 2 MG/ML
4 INJECTION INTRAMUSCULAR; INTRAVENOUS
Status: COMPLETED | OUTPATIENT
Start: 2023-11-29 | End: 2023-11-29

## 2023-11-29 RX ORDER — MAGNESIUM SULFATE HEPTAHYDRATE 40 MG/ML
2 INJECTION, SOLUTION INTRAVENOUS
Status: COMPLETED | OUTPATIENT
Start: 2023-11-29 | End: 2023-11-29

## 2023-11-29 RX ORDER — CALCIUM CARBONATE 500(1250)
2 TABLET ORAL DAILY
Qty: 60 TABLET | Refills: 11 | Status: SHIPPED | OUTPATIENT
Start: 2023-11-29 | End: 2024-01-29

## 2023-11-29 RX ADMIN — SODIUM CHLORIDE, POTASSIUM CHLORIDE, SODIUM LACTATE AND CALCIUM CHLORIDE 1000 ML: 600; 310; 30; 20 INJECTION, SOLUTION INTRAVENOUS at 05:11

## 2023-11-29 RX ADMIN — ONDANSETRON 4 MG: 2 INJECTION INTRAMUSCULAR; INTRAVENOUS at 05:11

## 2023-11-29 RX ADMIN — MAGNESIUM SULFATE HEPTAHYDRATE 2 G: 40 INJECTION, SOLUTION INTRAVENOUS at 07:11

## 2023-11-29 NOTE — TELEPHONE ENCOUNTER
Left message for patient to call our office to schedule follow up.    ----- Message from NATALYA Cali sent at 11/29/2023  2:48 PM CST -----  Schedule follow up visit for nausea and vomiting.

## 2023-11-29 NOTE — PROGRESS NOTES
ADVANCED PROSTATE CANCER CLINIC - FOLLOW UP     Best Contact Phone Number(s): [phone] (home)       Cancer/Stage/TNM:    Cancer Staging   Prostate cancer metastatic to intrapelvic lymph node  Staging form: Prostate, AJCC 8th Edition  - Clinical: Stage IVB (cTX, cN1, cM1a, PSA: 2.8, Grade Group: 3) - Signed by Edward Zuleta MD on 2/1/2023        Reason for visit: Mauro Benitez is a 57 y.o. male with history of prostate cancer sp RALP with positive margins 11/2/2015 with adjuvant RT to the prostate bed. He was subsequently found to have BCR with PSA 2.8  and maren relapse on PSMA imaging 06/2022. He started ADT 10/2022 with addition of /5 started 02/2023.  Medical course notable for recurrent nausea and vomiting thought due to gastroparesis.       History has been obtained by chart review and discussion with the patient.    Interval Events:      Worsening gastroparesis symptoms over the last few weeks. Follows with GI. Has decrease po intake with emesis. Poor compliance with AAP due to po intolerance.     RT in September  AAP compliance?  Triptorelin through [facility]    Completed RT in October.   **    Energy is OK. Continues to get a few hot flashes in the morning; generally tolerable. Has dribbling at end urination with ongoing urgency and occaisional urge incontinence. Unclear if myrbetriq is working. Seen twice in the ED last week with recurrent nausea and vomiting. Reports resolution of symptoms. No current abdominal pain. Reports normal bowel movements. Missed a few doses of abieraterone in setting of recent flare of N/V.    Saw rad onc on 8/17/23, will plan for salvage pelvic radiation. Scheduled to see Dr. Edmond 9/26/23 for ADT.     Recently started metformin along with his home lantus for DM2.     Oncology History   Prostate cancer metastatic to intrapelvic lymph node   10/12/2015 Imaging Significant Findings    CT a/p: No evidence of distant or maren metastatic disease    Bone scan: No  evidence of metastatic disease. 1cm rounded area of uptake along left superomedial orbit/sphenoid thought possibly calcified menigioma.     11/2/2015 Surgery    Radical prostatectomy at [facility] in [place]. Pathology with prostate adenocarcinoma up to Jagdeep 4+3 with associated PNI. Positive margin. 0/4 LN involved. Staged pT3aN0     2/23/2016 Imaging Significant Findings    Nuclear bone scan: No evidence of metastatic disease. Note made of uptake in left supraorbital region thought c/w left frontal sinusitis along with uptake in mandible and maxilla c/w periosteal disease.     4/27/2021 Imaging Significant Findings    Bone scan:  There is abnormal increased radiotracer activity identified involving the maxilla and multiple paranasal sinuses likely related to periodontal disease and sinus disease. Recommend clinical correlation and further evaluation with maxillofacial CT as warranted.     6/6/2022 Tumor Markers    PSA 2.8     6/21/2022 Imaging Significant Findings    PSMA PET-CT: In this patient with prostate malignancy status post prostatectomy, there are tracer-avid left para-aortic, presacral/left common iliac, and left external iliac chain nodes as described above, concerning for recurrent metastatic disease.     9/16/2022 -  Hormone Therapy    Start bicalutamide 50mg po daily     10/3/2022 -  Hormone Therapy    Trelstar 11.25mg IM x1     12/19/2022 Tumor Markers    PSA 0.02     1/4/2023 -  Hormone Therapy    Trelstart 11.25mg IM x1 with Dr. Edmond     2/1/2023 -  Chemotherapy    Treatment Summary   Plan Name: OP ABIRATERONE DAILY PREDNISONE BID  Treatment Goal: Curative  Status: Active  Start Date: 2/1/2023  End Date: 2/1/2023  Provider: Edward Zuleta MD  Chemotherapy: abiraterone (ZYTIGA) 250 mg Tab, 250 mg (100 % of original dose 250 mg), Oral, Daily, 1 of 1 cycle, Start date: 2/1/2023, End date: --  Dose modification: 250 mg (original dose 250 mg, Cycle 1)     3/27/2023 Imaging  Significant Findings    DEXA - Low risk osteopenia; repeat 2 years     3/29/2023 -  Hormone Therapy    Triptorelin 11.25mg     9/19/2023 - 10/26/2023 Radiation Therapy    Treating physician: Jagdish Mata  Total Dose: 50.4 Gy to the pelvic nodes  61.6 Gy to the PET + nodes   Fractions: 28 fractions at 1.8 and 2.2 Gy per fraction           Past Medical History:   Diagnosis Date    Diabetes mellitus     GERD (gastroesophageal reflux disease)     Hypertension     Prostate cancer          Past Surgical History:   Procedure Laterality Date    ESOPHAGOGASTRODUODENOSCOPY N/A 12/5/2022    Procedure: EGD (ESOPHAGOGASTRODUODENOSCOPY);  Surgeon: Beronica Valera MD;  Location: [facility];  Service: Endoscopy;  Laterality: N/A;    PROSTATECTOMY           I have reviewed and updated the patient's past medical, surgical, family and social histories.     Review of patient's allergies indicates:   Allergen Reactions    Lisinopril Shortness Of Breath         Current Outpatient Medications   Medication Sig Dispense Refill    abiraterone (ZYTIGA) 250 mg Tab Take 1 tablet (250 mg total) by mouth once daily Take as directed with a low fat meal. 30 tablet 11    acetaminophen (TYLENOL) 500 MG tablet Take 1,500 mg by mouth daily as needed for Pain.      amLODIPine (NORVASC) 10 MG tablet Take 10 mg by mouth every evening.      aspirin (ECOTRIN) 81 MG EC tablet Aspir-81 mg tablet,delayed release   Take 1 tablet every day by oral route in the morning for 30 days.      atorvastatin (LIPITOR) 40 MG tablet atorvastatin 40 mg tablet   TAKE 1 TABLET BY MOUTH EVERY DAY AT BEDTIME      blood sugar diagnostic (ONETOUCH VERIO TEST STRIPS MISC) OneTouch Verio test strips      blood sugar diagnostic (ONETOUCH VERIO TEST STRIPS MISC) OneTouch Verio test strips   USE 1 TEST STRIP TO CHECK BLOOD SUGAR TWICE DAILY      blood sugar diagnostic Strp Accu-Chek Odalis Plus test strips   Take 1 strip twice a day by miscell. route.      calcium carbonate  (OS-CALOS) 500 mg calcium (1,250 mg) tablet Take 2 tablets (1,000 mg total) by mouth once daily. 60 tablet 11    carvediloL (COREG) 25 MG tablet Take 1 tablet (25 mg total) by mouth 2 (two) times daily. 60 tablet 11    cholecalciferol, vitamin D3, (VITAMIN D3) 25 mcg (1,000 unit) capsule Take 1 capsule (1,000 Units total) by mouth once daily. 30 capsule 11    fluticasone propionate (FLONASE) 50 mcg/actuation nasal spray fluticasone propionate 50 mcg/actuation nasal spray,suspension   SPRAY 2 SPRAYS EVERY DAY BY INTRANASAL ROUTE AS NEEDED.      insulin glargine 100 units/mL SubQ pen Lantus Solostar U-100 Insulin 100 unit/mL (3 mL) subcutaneous pen   Inject 10 units every day by subcutaneous route in the evening.      loratadine (CLARITIN) 10 mg tablet loratadine 10 mg tablet      losartan (COZAAR) 100 MG tablet Take 100 mg by mouth once daily.      metFORMIN (GLUCOPHAGE) 1000 MG tablet metformin 1,000 mg tablet   TAKE 1 TABLET BY MOUTH TWICE DAILY      mirabegron (MYRBETRIQ) 25 mg Tb24 ER tablet Take 1 tablet (25 mg total) by mouth once daily. 30 tablet 11    omeprazole (PRILOSEC) 20 MG capsule omeprazole 20 mg capsule,delayed release   TAKE 1 TABLET BY MOUTH EVERY DAY      ondansetron (ZOFRAN-ODT) 4 MG TbDL ondansetron 4 mg disintegrating tablet   DISSOLVE 1 TABLET ON THE TONGUE THREE TIMES DAILY FOR 14 DAYS AS NEEDED      polyethylene glycol (GLYCOLAX) 17 gram PwPk Take 17 g by mouth once daily. 30 each 0    predniSONE (DELTASONE) 5 MG tablet Take 1 tablet (5 mg total) by mouth once daily. Take as directed with water on an empty stomach. 30 tablet 11    pregabalin (LYRICA) 50 MG capsule pregabalin 50 mg capsule   Take 1 capsule 3 times a day by oral route.   Taper off of gabapentin first      prochlorperazine (COMPAZINE) 10 MG tablet Take 1 tablet (10 mg total) by mouth every 6 (six) hours as needed (nausea). 24 tablet 0    sildenafiL (VIAGRA) 100 MG tablet Take 100 mg by mouth daily as needed for Erectile  Dysfunction.       Current Facility-Administered Medications   Medication Dose Route Frequency Provider Last Rate Last Admin    triptorelin pamoate SusR 11.25 mg  11.25 mg Intramuscular 1 time in Clinic/HOD Edward Edmond MD            Objective:      Physical Exam:   There were no vitals taken for this visit.      ECOG Performance status: (1) Restricted in physically strenuous activity, ambulatory and able to do work of light nature     Physical Exam     Recent Labs:   Lab Results   Component Value Date    WBC 4.33 10/20/2023    RBC 3.50 (L) 10/20/2023    HGB 10.5 (L) 10/20/2023    HCT 33.7 (L) 10/20/2023    MCV 96 10/20/2023    MCH 30.0 10/20/2023    MCHC 31.2 (L) 10/20/2023    RDW 16.3 (H) 10/20/2023     10/20/2023    MPV 9.5 10/20/2023    IMMGR 0.2 09/03/2023    GRAN 3.4 10/20/2023    IGABS 0.02 10/20/2023    LYMPH 2.5 09/03/2023    LYMPH 29.1 09/03/2023    MONO 0.6 09/03/2023    MONO 6.6 09/03/2023    EOS 0.2 09/03/2023    BASO 0.05 09/03/2023    NRBC 0 09/03/2023    EOSINOPHIL 2.7 09/03/2023    BASOPHIL 0.6 09/03/2023    DIFFMETHOD Automated 09/03/2023       Lab Results   Component Value Date     10/20/2023    K 4.9 10/20/2023     10/20/2023    CO2 21 (L) 10/20/2023     (H) 10/20/2023    BUN 18 10/20/2023    CREATININE 0.9 10/20/2023    CALCIUM 9.3 10/20/2023    PROT 6.8 10/20/2023    ALBUMIN 3.7 10/20/2023    BILITOT 0.6 10/20/2023    ALKPHOS 62 10/20/2023    AST 15 10/20/2023    ALT 19 10/20/2023    ANIONGAP 7 (L) 10/20/2023    EGFRNORACEVR >60.0 10/20/2023        Lab Results   Component Value Date    PSADIAG <0.01 10/20/2023    PSADIAG <0.01 09/08/2023    PSADIAG <0.01 07/21/2023    PSADIAG <0.01 06/09/2023    PSADIAG <0.01 05/12/2023    PSADIAG <0.01 05/01/2023    PSADIAG <0.01 04/14/2023    PSADIAG <0.01 03/30/2023    PSADIAG <0.01 03/16/2023    PSADIAG <0.01 03/01/2023    PSATOTAL <0.01 09/08/2023    PSATOTAL <0.01 06/21/2023    PSATOTAL <0.01 03/22/2023    PSATOTAL 0.02  "12/19/2022    PSATOTAL 2.8 06/06/2022        Cardiovascular Screening:  Primary care physician: Kosta Roberts MD      The 10-year ASCVD risk score (Niraj MCFADDEN, et al., 2019) is: 21.9%    Values used to calculate the score:      Age: 57 years      Sex: Male      Is Non- : Yes      Diabetic: Yes      Tobacco smoker: Yes      Systolic Blood Pressure: 99 mmHg      Is BP treated: Yes      HDL Cholesterol: 51 mg/dL      Total Cholesterol: 158 mg/dL    ASCVD Risk Level: High risk >= 20%    EKG:   Results for orders placed or performed during the hospital encounter of 04/30/23   EKG 12-lead    Collection Time: 04/30/23  7:50 AM    Narrative    Test Reason : R11.0,    Vent. Rate : 082 BPM     Atrial Rate : 082 BPM     P-R Int : 116 ms          QRS Dur : 080 ms      QT Int : 374 ms       P-R-T Axes : 060 029 046 degrees     QTc Int : 436 ms    Normal sinus rhythm  Cannot rule out Anterior infarct ,age undetermined  Abnormal ECG  When compared with ECG of 28-APR-2023 14:25,  QT has lengthened  Confirmed by Primitivo BEYER MD, Fady SEARS ([id number]) on 5/1/2023 11:04:03 AM    Referred By: DEEPAK REYES           Confirmed By:Fady Langford III, MD       High blood pressure = Yes  Antihypertensive agents: amLODIPine - 10 MG  carvediloL - 25 MG  losartan - 100 MG   Comments:     DM2: Yes  Antidiabetic agents: insulin - 100 unit/mL (3 mL)   Comments:     Hyperlipidemia: Yes  Lipid lowering agents: atorvastatin - 40 MG   Comments:     Antiplatelet therapy: Yes  Agent: aspirin - 81 MG   Comment:     There is no height or weight on file to calculate BMI.  If greater than 30, referral to nutrition    Lab Results   Component Value Date    CHOL 158 07/21/2023    LDLCALC 90.8 07/21/2023    HDL 51 07/21/2023    TRIG 81 07/21/2023    HGBA1C 7.0 (H) 05/12/2023        Bone Health    No results found for: "[id number]"     Results for orders placed during the hospital encounter of 03/14/23    DXA Bone Density with " Vertebral Fracture    Impression  *Low bone mass (Osteopenia); FRAX calculations do not support treatment as osteoporosis.    RECOMMENDATIONS:  *Daily calcium intake 6612-5869 mg, dietary sources preferred; Vitamin D 7157-0439 IU daily.  *Weight bearing exercise and fall precautions.  *If dedicated imaging is negative for vertebral fracture, then no additional treatment is recommended at this time. If positive for fracture, would treat as osteoporosis.  *If patient smokes would recommend cessation.  *Repeat BMD in 2 years.    EXPLANATION OF RESULTS:  T-score compares these results to the average bone density of a 20-29 year-old of the same gender.    Z-score compares this result to the average bone density to people of the same age, gender, and race.    The amounts indicate the number of standard deviations above or below the mean.    * Osteoporosis is generally defined as having a T-score between less than or equal to -2.5.    * Low bone mass (osteopenia) is generally defined as having a T-score between -1.0 and -2.5.    * The normal range is generally defined as having a T-score greater than or equal to -1.0.    * Calculated FRAX scores for fracture risk prediction may not be accurate in the setting of certain clinical factors such as pharmacologic therapy for osteoporosis, prior fragility fractures, high dose glucocorticoid use.      Electronically signed by: Tayla Amato MD  Date:    03/27/2023  Time:    07:46       Vitamin D: 1000 IU daily  Calcium: Recommend 4 servings of dairy daily     Osteopenia/Osteoporosis: Osteopenia    Bone strengthening agent: None     Staging Imaging     Results for orders placed during the hospital encounter of 06/21/22    NM PET CT F 18 PYL PSMA, Midthigh to Vertex    Impression  In this patient with prostate malignancy status post prostatectomy, there are tracer-avid left para-aortic, presacral/left common iliac, and left external iliac chain nodes as described above,  concerning for recurrent metastatic disease.    Additional findings as above.    This report was flagged in Epic as abnormal.    I, Narayan Harding MD, attest that I reviewed and interpreted the images.    Electronically signed by resident: Harshil Castaneda  Date:    06/21/2022  Time:    15:45    Electronically signed by: Narayan Harding  Date:    06/21/2022  Time:    17:34       Results for orders placed during the hospital encounter of 04/27/21    NM Bone Scan Whole Body    Impression  There is abnormal increased radiotracer activity identified involving the maxilla and multiple paranasal sinuses likely related to periodontal disease and sinus disease. Recommend clinical correlation and further evaluation with maxillofacial CT as warranted.      Electronically signed by: Anshul Ayon MD  Date:    04/27/2021  Time:    13:19      No results found for this or any previous visit.       Results for orders placed during the hospital encounter of 04/30/23    CT Abdomen Pelvis With Contrast    Impression  No acute abdominal or pelvic pathology CT of the abdomen and pelvis with contrast.    Mild fatty infiltration of the liver.    Indeterminate adrenal lesions, which were not increased in size.    Indeterminate 11-12 mm left upper pole renal cystic lesion.  Findings may represent a complex cyst.  If warranted, consider follow-up renal ultrasound when clinically appropriate.    Questionably a mildly thickened urinary bladder wall.  Findings may be due to infection or lower under distension.    Colonic diverticulosis.    This report was flagged in Epic as abnormal.    The preliminary and final reports are near concordant.      Electronically signed by: Anna Proctor  Date:    04/30/2023  Time:    19:04       I have personally reviewed the above imaging.     Path:   Reviewed pathology as documented above.    Genomic testing:     Germline genetic testing  No results found for this or any previous visit.     Somatic tumor  genotyping:      ctDNA genotyping:  Miscellaneous Genomic Results       Tumor NGS Blood  Resulted: 5/21/23 Status: Final result       Detected - Pathogenic (2)      Gene Variant Allelic State & Phase   TP53 p.R282W - c.844C>T Missense variant - LOF --   TP53 p.L194R - c.581T>G Missense variant - LOF --                             Diagnoses:     No diagnosis found.        Assessment and Plan:     {There are no diagnoses linked to this encounter. (Refresh or delete this SmartLink)}      Follow up:   Route Chart for Scheduling  Med Onc Route Chart for Scheduling     Treatment Plan Information   OP ABIRATERONE DAILY PREDNISONE BID   Edward Zuleta MD   Upcoming Treatment Dates - OP ABIRATERONE DAILY PREDNISONE BID    No upcoming days in selected categories.         The above information has been reviewed with the patient and all questions have been answered to their apparent satisfaction.  They understand that they can call the clinic with any questions.    Edward Zuleta

## 2023-11-29 NOTE — ED TRIAGE NOTES
Patient comes into the emergency department by POV with complaints of dehydration. Patient states that his hem/onc doctor told him to come to the ED because he is dehydrated. Pt reports abdominal pain, not having an appetite, and vomiting episode 3 weeks ago. .

## 2023-11-29 NOTE — PROGRESS NOTES
ADVANCED PROSTATE CANCER CLINIC - FOLLOW UP     Best Contact Phone Number(s): [phone] (home)       Cancer/Stage/TNM:    Cancer Staging   Prostate cancer metastatic to intrapelvic lymph node  Staging form: Prostate, AJCC 8th Edition  - Clinical: Stage IVB (cTX, cN1, cM1a, PSA: 2.8, Grade Group: 3) - Signed by Edward Zuleta MD on 2/1/2023        Reason for visit: Mauro Benitez is a 57 y.o. male with history of prostate cancer sp RALP with positive margins 11/2/2015 with adjuvant RT to the prostate bed. He was subsequently found to have BCR with PSA 2.8  and maren relapse on PSMA imaging 06/2022. He started ADT 10/2022 with addition of /5 started 02/2023.  Medical course notable for recurrent nausea and vomiting thought due to gastroparesis.       History has been obtained by chart review and discussion with the patient.    Interval Events:      Worsening gastroparesis symptoms over the last few weeks. Follows with GI. Has decrease po intake with emesis. Poor compliance with AAP due to po intolerance. His abdominal pain is worse over the past few weeks with intermittent vomiting but worsened diarrhea as his main complaint this afternoon. He has been unable to tolerate the majority of his meds and especially his abiraterone due to needing to take with food. He was emotional at this clinic visit due to the severity of his symptoms and will be sent to the ED for further management.     RT in September  Triptorelin through [facility] with next dose scheduled 12/2023    Completed RT in October.     Recently started metformin along with his home lantus for DM2.     Oncology History   Prostate cancer metastatic to intrapelvic lymph node   10/12/2015 Imaging Significant Findings    CT a/p: No evidence of distant or maren metastatic disease    Bone scan: No evidence of metastatic disease. 1cm rounded area of uptake along left superomedial orbit/sphenoid thought possibly calcified menigioma.     11/2/2015 Surgery     Radical prostatectomy at [facility] in [place]. Pathology with prostate adenocarcinoma up to [place] 4+3 with associated PNI. Positive margin. 0/4 LN involved. Staged pT3aN0     2/23/2016 Imaging Significant Findings    Nuclear bone scan: No evidence of metastatic disease. Note made of uptake in left supraorbital region thought c/w left frontal sinusitis along with uptake in mandible and maxilla c/w periosteal disease.     4/27/2021 Imaging Significant Findings    Bone scan:  There is abnormal increased radiotracer activity identified involving the maxilla and multiple paranasal sinuses likely related to periodontal disease and sinus disease. Recommend clinical correlation and further evaluation with maxillofacial CT as warranted.     6/6/2022 Tumor Markers    PSA 2.8     6/21/2022 Imaging Significant Findings    PSMA PET-CT: In this patient with prostate malignancy status post prostatectomy, there are tracer-avid left para-aortic, presacral/left common iliac, and left external iliac chain nodes as described above, concerning for recurrent metastatic disease.     9/16/2022 -  Hormone Therapy    Start bicalutamide 50mg po daily     10/3/2022 -  Hormone Therapy    Trelstar 11.25mg IM x1     12/19/2022 Tumor Markers    PSA 0.02     1/4/2023 -  Hormone Therapy    Trelstart 11.25mg IM x1 with Dr. Edmond     2/1/2023 -  Chemotherapy    Treatment Summary   Plan Name: OP ABIRATERONE DAILY PREDNISONE BID  Treatment Goal: Curative  Status: Active  Start Date: 2/1/2023  End Date: 2/1/2023  Provider: Edward Zuleta MD  Chemotherapy: abiraterone (ZYTIGA) 250 mg Tab, 250 mg (100 % of original dose 250 mg), Oral, Daily, 1 of 1 cycle, Start date: 2/1/2023, End date: --  Dose modification: 250 mg (original dose 250 mg, Cycle 1)     3/27/2023 Imaging Significant Findings    DEXA - Low risk osteopenia; repeat 2 years     3/29/2023 -  Hormone Therapy    Triptorelin 11.25mg     9/19/2023 - 10/26/2023 Radiation  Therapy    Treating physician: Jagdish Mata  Total Dose: 50.4 Gy to the pelvic nodes  61.6 Gy to the PET + nodes   Fractions: 28 fractions at 1.8 and 2.2 Gy per fraction           Past Medical History:   Diagnosis Date    Diabetes mellitus     GERD (gastroesophageal reflux disease)     Hypertension     Prostate cancer          Past Surgical History:   Procedure Laterality Date    ESOPHAGOGASTRODUODENOSCOPY N/A 12/5/2022    Procedure: EGD (ESOPHAGOGASTRODUODENOSCOPY);  Surgeon: Beronica Valera MD;  Location: [facility];  Service: Endoscopy;  Laterality: N/A;    PROSTATECTOMY           I have reviewed and updated the patient's past medical, surgical, family and social histories.     Review of patient's allergies indicates:   Allergen Reactions    Lisinopril Shortness Of Breath         Current Outpatient Medications   Medication Sig Dispense Refill    abiraterone (ZYTIGA) 250 mg Tab Take 1 tablet (250 mg total) by mouth once daily Take as directed with a low fat meal. 30 tablet 11    acetaminophen (TYLENOL) 500 MG tablet Take 1,500 mg by mouth daily as needed for Pain.      amLODIPine (NORVASC) 10 MG tablet Take 10 mg by mouth every evening.      aspirin (ECOTRIN) 81 MG EC tablet Aspir-81 mg tablet,delayed release   Take 1 tablet every day by oral route in the morning for 30 days.      atorvastatin (LIPITOR) 40 MG tablet atorvastatin 40 mg tablet   TAKE 1 TABLET BY MOUTH EVERY DAY AT BEDTIME      blood sugar diagnostic (ONETOUCH VERIO TEST STRIPS MISC) OneTouch Verio test strips      blood sugar diagnostic (ONETOUCH VERIO TEST STRIPS MISC) OneTouch Verio test strips   USE 1 TEST STRIP TO CHECK BLOOD SUGAR TWICE DAILY      blood sugar diagnostic Strp Accu-Chek Odalis Plus test strips   Take 1 strip twice a day by miscell. route.      calcium carbonate (OS-CALOS) 500 mg calcium (1,250 mg) tablet Take 2 tablets (1,000 mg total) by mouth once daily. 60 tablet 11    carvediloL (COREG) 25 MG tablet Take 1  tablet (25 mg total) by mouth 2 (two) times daily. 60 tablet 11    cholecalciferol, vitamin D3, (VITAMIN D3) 25 mcg (1,000 unit) capsule Take 1 capsule (1,000 Units total) by mouth once daily. 30 capsule 11    fluticasone propionate (FLONASE) 50 mcg/actuation nasal spray fluticasone propionate 50 mcg/actuation nasal spray,suspension   SPRAY 2 SPRAYS EVERY DAY BY INTRANASAL ROUTE AS NEEDED.      insulin glargine 100 units/mL SubQ pen Lantus Solostar U-100 Insulin 100 unit/mL (3 mL) subcutaneous pen   Inject 10 units every day by subcutaneous route in the evening.      loratadine (CLARITIN) 10 mg tablet loratadine 10 mg tablet      losartan (COZAAR) 100 MG tablet Take 100 mg by mouth once daily.      metFORMIN (GLUCOPHAGE) 1000 MG tablet metformin 1,000 mg tablet   TAKE 1 TABLET BY MOUTH TWICE DAILY      mirabegron (MYRBETRIQ) 25 mg Tb24 ER tablet Take 1 tablet (25 mg total) by mouth once daily. 30 tablet 11    omeprazole (PRILOSEC) 20 MG capsule omeprazole 20 mg capsule,delayed release   TAKE 1 TABLET BY MOUTH EVERY DAY      ondansetron (ZOFRAN-ODT) 4 MG TbDL ondansetron 4 mg disintegrating tablet   DISSOLVE 1 TABLET ON THE TONGUE THREE TIMES DAILY FOR 14 DAYS AS NEEDED      polyethylene glycol (GLYCOLAX) 17 gram PwPk Take 17 g by mouth once daily. 30 each 0    predniSONE (DELTASONE) 5 MG tablet Take 1 tablet (5 mg total) by mouth once daily. Take as directed with water on an empty stomach. 30 tablet 11    pregabalin (LYRICA) 50 MG capsule pregabalin 50 mg capsule   Take 1 capsule 3 times a day by oral route.   Taper off of gabapentin first      prochlorperazine (COMPAZINE) 10 MG tablet Take 1 tablet (10 mg total) by mouth every 6 (six) hours as needed (nausea). 24 tablet 0    sildenafiL (VIAGRA) 100 MG tablet Take 100 mg by mouth daily as needed for Erectile Dysfunction.       Current Facility-Administered Medications   Medication Dose Route Frequency Provider Last Rate Last Admin    triptorelin  "pamoate SusR 11.25 mg  11.25 mg Intramuscular 1 time in Clinic/HOD Edward Edmond MD            Objective:      Physical Exam:   /77 (BP Location: Left arm, Patient Position: Sitting, BP Method: Medium (Automatic))   Pulse 78   Resp 18   Ht 5' 11" (1.803 m)   Wt 88.7 kg (195 lb 10.5 oz)   SpO2 98%   BMI 27.29 kg/m²       ECOG Performance status: (1) Restricted in physically strenuous activity, ambulatory and able to do work of light nature     Physical Exam  Vitals and nursing note reviewed.   Constitutional:       Appearance: He is ill-appearing.   Cardiovascular:      Rate and Rhythm: Normal rate and regular rhythm.      Pulses: Normal pulses.      Heart sounds: Normal heart sounds.   Pulmonary:      Effort: Pulmonary effort is normal.      Breath sounds: Normal breath sounds.   Abdominal:      General: Abdomen is flat.      Palpations: Abdomen is soft.   Neurological:      General: No focal deficit present.      Mental Status: He is oriented to person, place, and time.   Psychiatric:         Mood and Affect: Mood normal.        Recent Labs:   Lab Results   Component Value Date    WBC 5.13 11/29/2023    RBC 3.42 (L) 11/29/2023    HGB 10.6 (L) 11/29/2023    HCT 33.7 (L) 11/29/2023    MCV 99 (H) 11/29/2023    MCH 31.0 11/29/2023    MCHC 31.5 (L) 11/29/2023    RDW 13.8 11/29/2023     11/29/2023    MPV 9.5 11/29/2023    IMMGR 0.2 09/03/2023    GRAN 4.2 11/29/2023    IGABS 0.03 11/29/2023    LYMPH 2.5 09/03/2023    LYMPH 29.1 09/03/2023    MONO 0.6 09/03/2023    MONO 6.6 09/03/2023    EOS 0.2 09/03/2023    BASO 0.05 09/03/2023    NRBC 0 09/03/2023    EOSINOPHIL 2.7 09/03/2023    BASOPHIL 0.6 09/03/2023    DIFFMETHOD Automated 09/03/2023       Lab Results   Component Value Date     (L) 11/29/2023    K 5.4 (H) 11/29/2023     11/29/2023    CO2 17 (L) 11/29/2023     (H) 11/29/2023    BUN 28 (H) 11/29/2023    CREATININE 1.8 (H) 11/29/2023    CALCIUM 9.6 11/29/2023    PROT 7.1 " 11/29/2023    ALBUMIN 4.0 11/29/2023    BILITOT 0.9 11/29/2023    ALKPHOS 57 11/29/2023    AST 19 11/29/2023    ALT 16 11/29/2023    ANIONGAP 10 11/29/2023    EGFRNORACEVR 43.1 (A) 11/29/2023        Lab Results   Component Value Date    PSADIAG <0.01 11/29/2023    PSADIAG <0.01 10/20/2023    PSADIAG <0.01 09/08/2023    PSADIAG <0.01 07/21/2023    PSADIAG <0.01 06/09/2023    PSADIAG <0.01 05/12/2023    PSADIAG <0.01 05/01/2023    PSADIAG <0.01 04/14/2023    PSADIAG <0.01 03/30/2023    PSADIAG <0.01 03/16/2023    PSATOTAL <0.01 09/08/2023    PSATOTAL <0.01 06/21/2023    PSATOTAL <0.01 03/22/2023    PSATOTAL 0.02 12/19/2022    PSATOTAL 2.8 06/06/2022        Cardiovascular Screening:  Primary care physician: Kosta Roberts MD      The 10-year ASCVD risk score (Niraj MCFADDEN, et al., 2019) is: 30.8%    Values used to calculate the score:      Age: 57 years      Sex: Male      Is Non- : Yes      Diabetic: Yes      Tobacco smoker: Yes      Systolic Blood Pressure: 122 mmHg      Is BP treated: Yes      HDL Cholesterol: 51 mg/dL      Total Cholesterol: 158 mg/dL    ASCVD Risk Level: High risk >= 20%    EKG:   Results for orders placed or performed during the hospital encounter of 04/30/23   EKG 12-lead    Collection Time: 04/30/23  7:50 AM    Narrative    Test Reason : R11.0,    Vent. Rate : 082 BPM     Atrial Rate : 082 BPM     P-R Int : 116 ms          QRS Dur : 080 ms      QT Int : 374 ms       P-R-T Axes : 060 029 046 degrees     QTc Int : 436 ms    Normal sinus rhythm  Cannot rule out Anterior infarct ,age undetermined  Abnormal ECG  When compared with ECG of 28-APR-2023 14:25,  QT has lengthened  Confirmed by Primitivo BEYER MD, Fady SEARS ([id number]) on 5/1/2023 11:04:03 AM    Referred By: DEEPAK REYES           Confirmed By:Fady Langford III, MD       High blood pressure = Yes  Antihypertensive agents: amLODIPine - 10 MG  carvediloL - 25 MG  losartan - 100 MG   Comments:     DM2: Yes  Antidiabetic  "agents: insulin - 100 unit/mL (3 mL)   Comments:     Hyperlipidemia: Yes  Lipid lowering agents: atorvastatin - 40 MG   Comments:     Antiplatelet therapy: Yes  Agent: aspirin - 81 MG   Comment:     Body mass index is 27.29 kg/m².  If greater than 30, referral to nutrition    Lab Results   Component Value Date    CHOL 158 07/21/2023    LDLCALC 90.8 07/21/2023    HDL 51 07/21/2023    TRIG 81 07/21/2023    HGBA1C 7.0 (H) 05/12/2023        Bone Health    No results found for: "[id number]"     Results for orders placed during the hospital encounter of 03/14/23    DXA Bone Density with Vertebral Fracture    Impression  *Low bone mass (Osteopenia); FRAX calculations do not support treatment as osteoporosis.    RECOMMENDATIONS:  *Daily calcium intake 5608-9108 mg, dietary sources preferred; Vitamin D 9110-2763 IU daily.  *Weight bearing exercise and fall precautions.  *If dedicated imaging is negative for vertebral fracture, then no additional treatment is recommended at this time. If positive for fracture, would treat as osteoporosis.  *If patient smokes would recommend cessation.  *Repeat BMD in 2 years.    EXPLANATION OF RESULTS:  T-score compares these results to the average bone density of a 20-29 year-old of the same gender.    Z-score compares this result to the average bone density to people of the same age, gender, and race.    The amounts indicate the number of standard deviations above or below the mean.    * Osteoporosis is generally defined as having a T-score between less than or equal to -2.5.    * Low bone mass (osteopenia) is generally defined as having a T-score between -1.0 and -2.5.    * The normal range is generally defined as having a T-score greater than or equal to -1.0.    * Calculated FRAX scores for fracture risk prediction may not be accurate in the setting of certain clinical factors such as pharmacologic therapy for osteoporosis, prior fragility fractures, high dose glucocorticoid " use.      Electronically signed by: Tayla Amato MD  Date:    03/27/2023  Time:    07:46       Vitamin D: 1000 IU daily  Calcium: Recommend 4 servings of dairy daily     Osteopenia/Osteoporosis: Osteopenia    Bone strengthening agent: None     Staging Imaging     Results for orders placed during the hospital encounter of 06/21/22    NM PET CT F 18 PYL PSMA, Midthigh to Vertex    Impression  In this patient with prostate malignancy status post prostatectomy, there are tracer-avid left para-aortic, presacral/left common iliac, and left external iliac chain nodes as described above, concerning for recurrent metastatic disease.    Additional findings as above.    This report was flagged in Epic as abnormal.    I, Narayan Harding MD, attest that I reviewed and interpreted the images.    Electronically signed by resident: Harshil Castaneda  Date:    06/21/2022  Time:    15:45    Electronically signed by: Narayan Harding  Date:    06/21/2022  Time:    17:34       Results for orders placed during the hospital encounter of 04/27/21    NM Bone Scan Whole Body    Impression  There is abnormal increased radiotracer activity identified involving the maxilla and multiple paranasal sinuses likely related to periodontal disease and sinus disease. Recommend clinical correlation and further evaluation with maxillofacial CT as warranted.      Electronically signed by: Anshul Ayon MD  Date:    04/27/2021  Time:    13:19      No results found for this or any previous visit.       Results for orders placed during the hospital encounter of 04/30/23    CT Abdomen Pelvis With Contrast    Impression  No acute abdominal or pelvic pathology CT of the abdomen and pelvis with contrast.    Mild fatty infiltration of the liver.    Indeterminate adrenal lesions, which were not increased in size.    Indeterminate 11-12 mm left upper pole renal cystic lesion.  Findings may represent a complex cyst.  If warranted, consider follow-up renal ultrasound when  clinically appropriate.    Questionably a mildly thickened urinary bladder wall.  Findings may be due to infection or lower under distension.    Colonic diverticulosis.    This report was flagged in Epic as abnormal.    The preliminary and final reports are near concordant.      Electronically signed by: Anna Proctor  Date:    04/30/2023  Time:    19:04       I have personally reviewed the above imaging.     Path:   Reviewed pathology as documented above.    Genomic testing:     Germline genetic testing  No results found for this or any previous visit.     Somatic tumor genotyping:      ctDNA genotyping:  Miscellaneous Genomic Results       Tumor NGS Blood  Resulted: 5/21/23 Status: Final result       Detected - Pathogenic (2)      Gene Variant Allelic State & Phase   TP53 p.R282W - c.844C>T Missense variant - LOF --   TP53 p.L194R - c.581T>G Missense variant - LOF --                             Diagnoses:     1. Androgen deprivation therapy    2. Prostate cancer            Assessment and Plan:     1. Androgen deprivation therapy  Overview:  Current agent: Triptorelin 11.25 (through Dr. Edmond)  Start date: 10/2022  Last dose: 9/26/23  Next dose: 12/27/23  Duration of therapy: Minimum 2 years    Novel Androgen signaling inhibitors:   Agent: Abiraterone + prednisone 250/5  Start date: 02/2023          2. Prostate cancer  -     calcium carbonate (OS-CALOS) 500 mg calcium (1,250 mg) tablet; Take 2 tablets (1,000 mg total) by mouth once daily.  Dispense: 60 tablet; Refill: 11    3. Gastroparesis due to DM  Overview:  Episodes of intractable N/V and abdominal pain every few months thought due to gastroparesis +/- cannibas hyperemesis.    -Sending to ED given HARRY and hyperkalemia with inability to tolerate PO  -Message sent to GI for follow-up, will need improvement to tolerate PO therapy        4. Prostate cancer metastatic to intrapelvic lymph node  Overview:  Intermediate risk [place] 4+3 disease sp R1 prostatectomy  2015 with adjuvant RT presumably to prostate bed. Now with PSA recurrence and PSMA imaging with regional maren metastases and limited intraabdominal maren mets.Started intensified hormonal therapy with ADT + abiraterone 02/2023. Completed salvage RT with Dr. Mata    -Unable to tolerate abiraterone 250mg and prednisone due to continued GI symptoms  -Return in 4 weeks for follow-up  -If persistent, can consider treatment adjustments            Follow up:   Route Chart for Scheduling    Med Onc Chart Routing      Follow up with physician 4 weeks. Dr. Zuleta   Follow up with [facility]    Infusion scheduling note    Injection scheduling note    Labs CBC, CMP and PSA   Scheduling:  Preferred lab:  Lab interval:  Labs prior to next visit   Imaging    Pharmacy appointment    Other referrals               Treatment Plan Information   OP ABIRATERONE DAILY PREDNISONE BID   Edward Zuleta MD   Upcoming Treatment Dates - OP ABIRATERONE DAILY PREDNISONE BID    No upcoming days in selected categories.         The above information has been reviewed with the patient and all questions have been answered to their apparent satisfaction.  They understand that they can call the clinic with any questions.    Justin Aguirre

## 2023-11-29 NOTE — ED PROVIDER NOTES
Encounter Date: 11/29/2023       History     Chief Complaint   Patient presents with    Dehydration     Sent from hem/onc     [age] year old male presents after referral from Hematology-Oncology Clinic.  Patient is receiving hormone deprivation therapy and prednisone for prostate cancer.  He recently completed radiation therapy.  He reports p.o. intolerance to his prednisone and abiraterone due to frequent episodes of nausea vomiting.  He was thought to have gastroparesis.  He was found to have an elevated creatinine of 1.8 and was referred to the emergency department for dehydration.  He reports he is not vomited for 3 days but has had ongoing severe nausea and anorexia.  Denies any new urinary symptoms.  He does report he is having some abdominal pain.  He denies fever but has had occasional chills.  He reports he has a prescription for ondansetron but has not been taking it due to p.o. intolerance      Review of patient's allergies indicates:   Allergen Reactions    Lisinopril Shortness Of Breath     Past Medical History:   Diagnosis Date    Diabetes mellitus     GERD (gastroesophageal reflux disease)     Hypertension     Prostate cancer      Past Surgical History:   Procedure Laterality Date    ESOPHAGOGASTRODUODENOSCOPY N/A 12/5/2022    Procedure: EGD (ESOPHAGOGASTRODUODENOSCOPY);  Surgeon: Beronica Valera MD;  Location: [facility];  Service: Endoscopy;  Laterality: N/A;    PROSTATECTOMY       Family History   Problem Relation Age of Onset    Prostate cancer Father     Pancreatic cancer Neg Hx     Breast cancer Neg Hx      Social History     Tobacco Use    Smoking status: Every Day     Types: Cigars    Smokeless tobacco: Never    Tobacco comments:     2-3 cigars/day   Substance Use Topics    Alcohol use: Yes     Comment: Occaisionally throughout the week    Drug use: Yes     Types: Marijuana     Comment: Remote crack cocaine use. No IVDU. Daily marijuana use.     Review of Systems  All other systems  reviewed and negative except as noted in HPI    Physical Exam     Initial Vitals [11/29/23 1523]   BP Pulse Resp Temp SpO2   138/77 75 16 98.8 °F (37.1 °C) 98 %      MAP       --         Physical Exam    Nursing note and vitals reviewed.  Constitutional: He appears well-developed and well-nourished. No distress.   HENT:   Head: Normocephalic and atraumatic.   Dry mucous membranes   Eyes: Conjunctivae and EOM are normal. Pupils are equal, round, and reactive to light. No scleral icterus.   Neck: Neck supple.   Normal range of motion.  Cardiovascular:  Normal rate and regular rhythm.           Pulmonary/Chest: Breath sounds normal. No respiratory distress.   Abdominal: Abdomen is soft. He exhibits no distension. There is no abdominal tenderness.   Musculoskeletal:         General: No tenderness or edema. Normal range of motion.      Cervical back: Normal range of motion and neck supple.     Neurological: He is alert and oriented to person, place, and time. He has normal strength. No sensory deficit.   Skin: Skin is warm and dry. Capillary refill takes 2 to 3 seconds.         ED Course   Procedures  Labs Reviewed   URINALYSIS, REFLEX TO URINE CULTURE - Abnormal; Notable for the following components:       Result Value    Ketones, UA Trace (*)     All other components within normal limits    Narrative:     Specimen Source->Urine   MAGNESIUM - Abnormal; Notable for the following components:    Magnesium 1.5 (*)     All other components within normal limits   PHOSPHORUS   URIC ACID     EKG Readings: (Independently Interpreted)   Initial Reading: No STEMI. Previous EKG: Compared with most recent EKG Previous EKG Date: 04/30/2023. Rhythm: Sinus Arrhythmia.   Normal sinus rhythm with sinus arrhythmia   T-wave amplitude decreased from previous     ECG Results              EKG 12-lead (Final result)  Result time 11/30/23 14:59:58      Final result by Interface, Lab In [facility] (11/30/23 14:59:58)                   Narrative:     Test Reason : N17.9,    Vent. Rate : 092 BPM     Atrial Rate : 092 BPM     P-R Int : 166 ms          QRS Dur : 074 ms      QT Int : 340 ms       P-R-T Axes : 045 023 -01 degrees     QTc Int : 420 ms    Normal sinus rhythm with sinus arrhythmia  Minimal voltage criteria for LVH, may be normal variant  Borderline Abnormal ECG  When compared with ECG of 30-APR-2023 07:50,  Non-specific change in ST segment in Inferior leads  T wave inversion now evident in Inferior leads  Confirmed by Ruddy Ward MD ([id number]) on 11/30/2023 2:59:52 PM    Referred By: KELLIEERR   SELF           Confirmed By:Ruddy Ward MD                                  Imaging Results    None          Medications   lactated ringers bolus 1,000 mL (0 mLs Intravenous Stopped 11/29/23 1855)   ondansetron injection 4 mg (4 mg Intravenous Given 11/29/23 1700)   magnesium sulfate 2g in water 50mL IVPB (premix) (0 g Intravenous Stopped 11/29/23 2035)     Medical Decision Making  Examination is consistent with volume depletion.  Acute kidney injury noted on lab assays drawn earlier today.  No significant anemia noted.  Will add on additional electrolyte studies given the patient is receiving cancer therapy.  Will provide IV hydration and antiemetic.  Plan for admission to Hematology-Oncology for HARRY dehydration in the setting of gastroparesis and active cancer therapy.    Amount and/or Complexity of Data Reviewed  External Data Reviewed: labs, radiology, ECG and notes.     Details: Worsening gastroparesis symptoms over the last few weeks. Follows with GI. Has decrease po intake with emesis. Poor compliance with AAP due to po intolerance. His abdominal pain is worse over the past few weeks with intermittent vomiting but worsened diarrhea as his main complaint this afternoon. He has been unable to tolerate the majority of his meds and especially his abiraterone due to needing to take with food. He was emotional at this clinic visit due to the severity of  his symptoms and will be sent to the ED for further management.      RT in September  Triptorelin through [facility] with next dose scheduled 12/2023     Completed RT in October.        Labs: ordered. Decision-making details documented in ED Course.  Radiology: ordered and independent interpretation performed. Decision-making details documented in ED Course.  ECG/medicine tests: ordered and independent interpretation performed. Decision-making details documented in ED Course.    Risk  Prescription drug management.  Drug therapy requiring intensive monitoring for toxicity.               ED Course as of 11/30/23 1737 Wed Nov 29, 2023 2024 Patient appears much better perfused and hydrated on examination.  His BUN and creatinine have normalized with IV hydration.  He is now able to sit upright without any nausea.  He requests discharge.  Given the improvement in his lab assays and clinical condition, this is reasonable.  I counseled to follow up with his oncologist tomorrow. [DS]      ED Course User Index  [DS] Sessions, Narayan ROBERTSON MD                           Clinical Impression:  Final diagnoses:  [N17.9] HARRY (acute kidney injury)  [E86.0] Dehydration (Primary)          ED Disposition Condition    Discharge Stable          ED Prescriptions    None       Follow-up Information       Follow up With Specialties Details Why Contact Info    Justin Aguirre DO Oncology Call on 11/30/2023  [address] [facility] [address]  [phone]               Sessions, Narayan ROBERTSON MD  11/30/23 1738

## 2023-11-30 NOTE — ED NOTES
I-STAT Chem-8+ Results:   Value Reference Range   Sodium 138 136-145 mmol/L   Potassium  4.2 3.5-5.1 mmol/L   Chloride 96  mmol/L   Ionized Calcium 1.10 1.06-1.42 mmol/L   CO2 (measured) 26 23-29 mmol/L   Glucose 99  mg/dL   BUN 9 6-30 mg/dL   Creatinine 0.8 0.5-1.4 mg/dL   Hematocrit 39 36-54%

## 2023-11-30 NOTE — DISCHARGE INSTRUCTIONS
Follow-up with your oncologist by phone or secure chat tomorrow to notify them of your visit.  This may change your prostate cancer treatment plan     If you experience uncontrolled vomiting, dizziness or lightheadedness, or any new or concerning symptoms, return to the emergency department immediately.

## 2023-12-01 ENCOUNTER — TELEPHONE (OUTPATIENT)
Dept: GASTROENTEROLOGY | Facility: CLINIC | Age: 58
End: 2023-12-01
Payer: MEDICAID

## 2023-12-01 NOTE — TELEPHONE ENCOUNTER
Attempted to contact patient to schedule f/u visit, VM box is full.    ----- Message from Anita Hendricks MA sent at 11/29/2023  4:27 PM CST -----  Call patient to schedule f/u for nausea and vomiting.

## 2023-12-13 NOTE — PATIENT INSTRUCTIONS
You are tolerating homrone therapy well with ADT shots + abiraterone. PSA levels remain undetectable indicating excellent cancer control.     You do have expected side effects including hot flashes. Reduce the venlafaxine back to 37.5mg from 75mg.     Continue abiraterone 250mg daily with prednisone 5mg daily. Follow up with Dr. Edmond for Lupron shot later this month    Ask Dr. Edmond about switching from oxybutynin to mybetriq due to dry mouth    Follow up with Dr. Roberts about your diabetes medications    Referral placed to radiation oncology    To schedule genetics consultation, call She Flores at [phone]    Repeat lab work in 6 weeks    Follow up with Dr. Zuleta in 12 weeks   Pt to ED c/o erection lasting 5h s/p PAPAV injection this morning. Denies pain.

## 2023-12-26 ENCOUNTER — OFFICE VISIT (OUTPATIENT)
Dept: HEMATOLOGY/ONCOLOGY | Facility: CLINIC | Age: 58
End: 2023-12-26
Payer: MEDICAID

## 2023-12-26 ENCOUNTER — LAB VISIT (OUTPATIENT)
Dept: LAB | Facility: HOSPITAL | Age: 58
End: 2023-12-26
Attending: HOSPITALIST
Payer: MEDICAID

## 2023-12-26 VITALS
HEIGHT: 71 IN | OXYGEN SATURATION: 99 % | WEIGHT: 205.81 LBS | DIASTOLIC BLOOD PRESSURE: 78 MMHG | HEART RATE: 70 BPM | SYSTOLIC BLOOD PRESSURE: 164 MMHG | RESPIRATION RATE: 18 BRPM | BODY MASS INDEX: 28.81 KG/M2

## 2023-12-26 DIAGNOSIS — E11.43 GASTROPARESIS DUE TO DM: ICD-10-CM

## 2023-12-26 DIAGNOSIS — I10 HYPERTENSION, UNSPECIFIED TYPE: ICD-10-CM

## 2023-12-26 DIAGNOSIS — D63.0 ANEMIA ASSOCIATED WITH MALIGNANT NEOPLASTIC DISEASE: ICD-10-CM

## 2023-12-26 DIAGNOSIS — C61 PROSTATE CANCER METASTATIC TO INTRAPELVIC LYMPH NODE: Primary | ICD-10-CM

## 2023-12-26 DIAGNOSIS — C77.5 PROSTATE CANCER METASTATIC TO INTRAPELVIC LYMPH NODE: Primary | ICD-10-CM

## 2023-12-26 DIAGNOSIS — E11.42 TYPE 2 DIABETES MELLITUS WITH DIABETIC POLYNEUROPATHY, WITHOUT LONG-TERM CURRENT USE OF INSULIN: ICD-10-CM

## 2023-12-26 DIAGNOSIS — E78.2 MIXED HYPERLIPIDEMIA: ICD-10-CM

## 2023-12-26 DIAGNOSIS — C61 PROSTATE CANCER: ICD-10-CM

## 2023-12-26 DIAGNOSIS — Z79.899 LONG TERM CURRENT USE OF THERAPEUTIC DRUG: ICD-10-CM

## 2023-12-26 DIAGNOSIS — K31.84 GASTROPARESIS DUE TO DM: ICD-10-CM

## 2023-12-26 DIAGNOSIS — R07.9 CHEST PAIN, UNSPECIFIED TYPE: ICD-10-CM

## 2023-12-26 DIAGNOSIS — C80.1 ANEMIA ASSOCIATED WITH MALIGNANT NEOPLASTIC DISEASE: ICD-10-CM

## 2023-12-26 DIAGNOSIS — Z79.818 ANDROGEN DEPRIVATION THERAPY: ICD-10-CM

## 2023-12-26 DIAGNOSIS — M85.80 OSTEOPENIA, UNSPECIFIED LOCATION: ICD-10-CM

## 2023-12-26 LAB
ALBUMIN SERPL BCP-MCNC: 3.7 G/DL (ref 3.5–5.2)
ALP SERPL-CCNC: 55 U/L (ref 55–135)
ALT SERPL W/O P-5'-P-CCNC: 18 U/L (ref 10–44)
ANION GAP SERPL CALC-SCNC: 8 MMOL/L (ref 8–16)
AST SERPL-CCNC: 15 U/L (ref 10–40)
BILIRUB SERPL-MCNC: 0.7 MG/DL (ref 0.1–1)
BUN SERPL-MCNC: 12 MG/DL (ref 6–20)
CALCIUM SERPL-MCNC: 9.1 MG/DL (ref 8.7–10.5)
CHLORIDE SERPL-SCNC: 110 MMOL/L (ref 95–110)
CO2 SERPL-SCNC: 21 MMOL/L (ref 23–29)
COMPLEXED PSA SERPL-MCNC: <0.01 NG/ML (ref 0–4)
CREAT SERPL-MCNC: 0.9 MG/DL (ref 0.5–1.4)
ERYTHROCYTE [DISTWIDTH] IN BLOOD BY AUTOMATED COUNT: 14.6 % (ref 11.5–14.5)
EST. GFR  (NO RACE VARIABLE): >60 ML/MIN/1.73 M^2
GLUCOSE SERPL-MCNC: 116 MG/DL (ref 70–110)
HCT VFR BLD AUTO: 32.1 % (ref 40–54)
HGB BLD-MCNC: 9.9 G/DL (ref 14–18)
IMM GRANULOCYTES # BLD AUTO: 0.03 K/UL (ref 0–0.04)
MCH RBC QN AUTO: 32.1 PG (ref 27–31)
MCHC RBC AUTO-ENTMCNC: 30.8 G/DL (ref 32–36)
MCV RBC AUTO: 104 FL (ref 82–98)
NEUTROPHILS # BLD AUTO: 5 K/UL (ref 1.8–7.7)
PLATELET # BLD AUTO: 279 K/UL (ref 150–450)
PMV BLD AUTO: 10 FL (ref 9.2–12.9)
POTASSIUM SERPL-SCNC: 4.9 MMOL/L (ref 3.5–5.1)
PROT SERPL-MCNC: 6.7 G/DL (ref 6–8.4)
RBC # BLD AUTO: 3.08 M/UL (ref 4.6–6.2)
SODIUM SERPL-SCNC: 139 MMOL/L (ref 136–145)
WBC # BLD AUTO: 5.99 K/UL (ref 3.9–12.7)

## 2023-12-26 PROCEDURE — 3051F HG A1C>EQUAL 7.0%<8.0%: CPT | Mod: CPTII,,, | Performed by: HOSPITALIST

## 2023-12-26 PROCEDURE — 4010F ACE/ARB THERAPY RXD/TAKEN: CPT | Mod: CPTII,,, | Performed by: HOSPITALIST

## 2023-12-26 PROCEDURE — 99215 PR OFFICE/OUTPT VISIT, EST, LEVL V, 40-54 MIN: ICD-10-PCS | Mod: S$PBB,,, | Performed by: HOSPITALIST

## 2023-12-26 PROCEDURE — 3077F SYST BP >= 140 MM HG: CPT | Mod: CPTII,,, | Performed by: HOSPITALIST

## 2023-12-26 PROCEDURE — 80053 COMPREHEN METABOLIC PANEL: CPT | Performed by: HOSPITALIST

## 2023-12-26 PROCEDURE — 99215 OFFICE O/P EST HI 40 MIN: CPT | Mod: S$PBB,,, | Performed by: HOSPITALIST

## 2023-12-26 PROCEDURE — 4010F PR ACE/ARB THEARPY RXD/TAKEN: ICD-10-PCS | Mod: CPTII,,, | Performed by: HOSPITALIST

## 2023-12-26 PROCEDURE — 3008F BODY MASS INDEX DOCD: CPT | Mod: CPTII,,, | Performed by: HOSPITALIST

## 2023-12-26 PROCEDURE — 3008F PR BODY MASS INDEX (BMI) DOCUMENTED: ICD-10-PCS | Mod: CPTII,,, | Performed by: HOSPITALIST

## 2023-12-26 PROCEDURE — 85027 COMPLETE CBC AUTOMATED: CPT | Performed by: HOSPITALIST

## 2023-12-26 PROCEDURE — 3051F PR MOST RECENT HEMOGLOBIN A1C LEVEL 7.0 - < 8.0%: ICD-10-PCS | Mod: CPTII,,, | Performed by: HOSPITALIST

## 2023-12-26 PROCEDURE — 99214 OFFICE O/P EST MOD 30 MIN: CPT | Mod: PBBFAC | Performed by: HOSPITALIST

## 2023-12-26 PROCEDURE — 99999 PR PBB SHADOW E&M-EST. PATIENT-LVL IV: CPT | Mod: PBBFAC,,, | Performed by: HOSPITALIST

## 2023-12-26 PROCEDURE — 84153 ASSAY OF PSA TOTAL: CPT | Performed by: HOSPITALIST

## 2023-12-26 PROCEDURE — 3078F PR MOST RECENT DIASTOLIC BLOOD PRESSURE < 80 MM HG: ICD-10-PCS | Mod: CPTII,,, | Performed by: HOSPITALIST

## 2023-12-26 PROCEDURE — 99999 PR PBB SHADOW E&M-EST. PATIENT-LVL IV: ICD-10-PCS | Mod: PBBFAC,,, | Performed by: HOSPITALIST

## 2023-12-26 PROCEDURE — 36415 COLL VENOUS BLD VENIPUNCTURE: CPT | Performed by: HOSPITALIST

## 2023-12-26 PROCEDURE — 3078F DIAST BP <80 MM HG: CPT | Mod: CPTII,,, | Performed by: HOSPITALIST

## 2023-12-26 PROCEDURE — 3077F PR MOST RECENT SYSTOLIC BLOOD PRESSURE >= 140 MM HG: ICD-10-PCS | Mod: CPTII,,, | Performed by: HOSPITALIST

## 2023-12-26 NOTE — PROGRESS NOTES
ADVANCED PROSTATE CANCER CLINIC - FOLLOW UP     Best Contact Phone Number(s): [phone] (home)       Cancer/Stage/TNM:    Cancer Staging   Prostate cancer metastatic to intrapelvic lymph node  Staging form: Prostate, AJCC 8th Edition  - Clinical: Stage IVB (cTX, cN1, cM1a, PSA: 2.8, Grade Group: 3) - Signed by Edward Zuleta MD on 2/1/2023        Reason for visit: Metachronous recurrence of prostate cancer following RALP and adjuvant RT to pelvic LN on ADT + AAP and salvage maren RT.    HPI: Mauro Benitez is a 58 y.o. male with history of prostate cancer sp RALP with positive margins 11/2/2015 with adjuvant RT to the prostate bed. He was subsequently found to have BCR with PSA 2.8  and maren relapse on PSMA imaging 06/2022. He started ADT 10/2022 with addition of /5 started 02/2023 with salvage RT to pelvic nodes 09/2023 - 10/2023.  Medical course notable for recurrent nausea and vomiting thought due to gastroparesis.     History has been obtained by chart review and discussion with the patient.    Interval Events:     Last seen in clinic 11/29/23. Referred to ED with worsening gastroparesis symptoms. Has not yet seen GI in follow up.    No current gastroparesis flare since his last visit. Appetite has been good. No further nausea. Reports normal bowel movements. Energy is low. No signficant hot flashes. No difficulty urinating. Back to taking his abiraterone + prednisone. Did have some atypical chest discomfort last night that resolved spontaneously. No palptiations. No leg swelling.  BP elevated today in setting of large [employer] dinner yestrday with dietary indiscretion. No leg swelling. No SOB or cough.      Oncology History   Prostate cancer metastatic to intrapelvic lymph node   10/12/2015 Imaging Significant Findings    CT a/p: No evidence of distant or maren metastatic disease    Bone scan: No evidence of metastatic disease. 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     11/2/2015 Surgery    Radical prostatectomy at [facility] in [place]. Pathology with prostate adenocarcinoma up to [place] 4+3 with associated PNI. Positive margin. 0/4 LN involved. Staged pT3aN0     2/23/2016 Imaging Significant Findings    Nuclear bone scan: No evidence of metastatic disease. Note made of uptake in left supraorbital region thought c/w left frontal sinusitis along with uptake in mandible and maxilla c/w periosteal disease.     4/27/2021 Imaging Significant Findings    Bone scan:  There is abnormal increased radiotracer activity identified involving the maxilla and multiple paranasal sinuses likely related to periodontal disease and sinus disease. Recommend clinical correlation and further evaluation with maxillofacial CT as warranted.     6/6/2022 Tumor Markers    PSA 2.8     6/21/2022 Imaging Significant Findings    PSMA PET-CT: In this patient with prostate malignancy status post prostatectomy, there are tracer-avid left para-aortic, presacral/left common iliac, and left external iliac chain nodes as described above, concerning for recurrent metastatic disease.     9/16/2022 -  Hormone Therapy    Start bicalutamide 50mg po daily     10/3/2022 -  Hormone Therapy    Trelstar 11.25mg IM x1     12/19/2022 Tumor Markers    PSA 0.02     1/4/2023 -  Hormone Therapy    Trelstart 11.25mg IM x1 with Dr. Edmond     2/1/2023 -  Chemotherapy    Treatment Summary   Plan Name: OP ABIRATERONE DAILY PREDNISONE BID  Treatment Goal: Curative  Status: Active  Start Date: 2/1/2023  End Date: 2/1/2023  Provider: Edward Zuleta MD  Chemotherapy: abiraterone (ZYTIGA) 250 mg Tab, 250 mg (100 % of original dose 250 mg), Oral, Daily, 1 of 1 cycle, Start date: 2/1/2023, End date: --  Dose modification: 250 mg (original dose 250 mg, Cycle 1)     3/27/2023 Imaging Significant Findings    DEXA - Low risk osteopenia; repeat 2 years     3/29/2023 -  Hormone Therapy     Triptorelin 11.25mg     9/19/2023 - 10/26/2023 Radiation Therapy    Treating physician: Jagdish Mata  Total Dose: 50.4 Gy to the pelvic nodes  61.6 Gy to the PET + nodes   Fractions: 28 fractions at 1.8 and 2.2 Gy per fraction           Past Medical History:   Diagnosis Date    Diabetes mellitus     GERD (gastroesophageal reflux disease)     Hypertension     Prostate cancer          Past Surgical History:   Procedure Laterality Date    ESOPHAGOGASTRODUODENOSCOPY N/A 12/5/2022    Procedure: EGD (ESOPHAGOGASTRODUODENOSCOPY);  Surgeon: Beronica Valera MD;  Location: [facility];  Service: Endoscopy;  Laterality: N/A;    PROSTATECTOMY           I have reviewed and updated the patient's past medical, surgical, family and social histories.     Review of patient's allergies indicates:   Allergen Reactions    Lisinopril Shortness Of Breath         Current Outpatient Medications   Medication Sig Dispense Refill    abiraterone (ZYTIGA) 250 mg Tab Take 1 tablet (250 mg total) by mouth once daily Take as directed with a low fat meal. 30 tablet 11    acetaminophen (TYLENOL) 500 MG tablet Take 1,500 mg by mouth daily as needed for Pain.      amLODIPine (NORVASC) 10 MG tablet Take 10 mg by mouth every evening.      aspirin (ECOTRIN) 81 MG EC tablet Aspir-81 mg tablet,delayed release   Take 1 tablet every day by oral route in the morning for 30 days.      atorvastatin (LIPITOR) 40 MG tablet atorvastatin 40 mg tablet   TAKE 1 TABLET BY MOUTH EVERY DAY AT BEDTIME      blood sugar diagnostic (ONETOUCH VERIO TEST STRIPS MISC) OneTouch Verio test strips      blood sugar diagnostic (ONETOUCH VERIO TEST STRIPS MISC) OneTouch Verio test strips   USE 1 TEST STRIP TO CHECK BLOOD SUGAR TWICE DAILY      blood sugar diagnostic Str Accu-Chek Odalis Plus test strips   Take 1 strip twice a day by miscell. route.      calcium carbonate (OS-CALOS) 500 mg calcium (1,250 mg) tablet Take 2 tablets (1,000 mg total) by mouth once daily. 60 tablet 11     carvediloL (COREG) 25 MG tablet Take 1 tablet (25 mg total) by mouth 2 (two) times daily. 60 tablet 11    cholecalciferol, vitamin D3, (VITAMIN D3) 25 mcg (1,000 unit) capsule Take 1 capsule (1,000 Units total) by mouth once daily. 30 capsule 11    fluticasone propionate (FLONASE) 50 mcg/actuation nasal spray fluticasone propionate 50 mcg/actuation nasal spray,suspension   SPRAY 2 SPRAYS EVERY DAY BY INTRANASAL ROUTE AS NEEDED.      insulin glargine 100 units/mL SubQ pen Lantus Solostar U-100 Insulin 100 unit/mL (3 mL) subcutaneous pen   Inject 10 units every day by subcutaneous route in the evening.      loratadine (CLARITIN) 10 mg tablet loratadine 10 mg tablet      losartan (COZAAR) 100 MG tablet Take 100 mg by mouth once daily.      metFORMIN (GLUCOPHAGE) 1000 MG tablet metformin 1,000 mg tablet   TAKE 1 TABLET BY MOUTH TWICE DAILY      mirabegron (MYRBETRIQ) 25 mg Tb24 ER tablet Take 1 tablet (25 mg total) by mouth once daily. 30 tablet 11    omeprazole (PRILOSEC) 20 MG capsule omeprazole 20 mg capsule,delayed release   TAKE 1 TABLET BY MOUTH EVERY DAY      ondansetron (ZOFRAN-ODT) 4 MG TbDL ondansetron 4 mg disintegrating tablet   DISSOLVE 1 TABLET ON THE TONGUE THREE TIMES DAILY FOR 14 DAYS AS NEEDED      polyethylene glycol (GLYCOLAX) 17 gram PwPk Take 17 g by mouth once daily. 30 each 0    predniSONE (DELTASONE) 5 MG tablet Take 1 tablet (5 mg total) by mouth once daily. Take as directed with water on an empty stomach. 30 tablet 11    pregabalin (LYRICA) 50 MG capsule pregabalin 50 mg capsule   Take 1 capsule 3 times a day by oral route.   Taper off of gabapentin first      prochlorperazine (COMPAZINE) 10 MG tablet Take 1 tablet (10 mg total) by mouth every 6 (six) hours as needed (nausea). 24 tablet 0    sildenafiL (VIAGRA) 100 MG tablet Take 100 mg by mouth daily as needed for Erectile Dysfunction.       Current Facility-Administered Medications   Medication Dose Route Frequency Provider Last Rate Last  "Admin    triptorelin pamoate SusR 11.25 mg  11.25 mg Intramuscular 1 time in Clinic/HOD Edward Edmond MD            Objective:      Physical Exam:   BP (!) 164/78 (BP Location: Left arm, Patient Position: Sitting, BP Method: Medium (Automatic))   Pulse 70   Resp 18   Ht 5' 11" (1.803 m)   Wt 93.3 kg (205 lb 12.8 oz)   SpO2 99%   BMI 28.70 kg/m²       ECOG Performance status: (1) Restricted in physically strenuous activity, ambulatory and able to do work of light nature     Physical Exam  Vitals and nursing note reviewed.   Constitutional:       Appearance: He is ill-appearing.   Cardiovascular:      Rate and Rhythm: Normal rate and regular rhythm.      Pulses: Normal pulses.      Heart sounds: Normal heart sounds.   Pulmonary:      Effort: Pulmonary effort is normal.      Breath sounds: Normal breath sounds.   Abdominal:      General: Abdomen is flat.      Palpations: Abdomen is soft.   Neurological:      General: No focal deficit present.      Mental Status: He is oriented to person, place, and time.   Psychiatric:         Mood and Affect: Mood normal.          Recent Labs:   Lab Results   Component Value Date    WBC 5.99 12/26/2023    RBC 3.08 (L) 12/26/2023    HGB 9.9 (L) 12/26/2023    HCT 32.1 (L) 12/26/2023     (H) 12/26/2023    MCH 32.1 (H) 12/26/2023    MCHC 30.8 (L) 12/26/2023    RDW 14.6 (H) 12/26/2023     12/26/2023    MPV 10.0 12/26/2023    IMMGR 0.2 09/03/2023    GRAN 5.0 12/26/2023    IGABS 0.03 12/26/2023    LYMPH 2.5 09/03/2023    LYMPH 29.1 09/03/2023    MONO 0.6 09/03/2023    MONO 6.6 09/03/2023    EOS 0.2 09/03/2023    BASO 0.05 09/03/2023    NRBC 0 09/03/2023    EOSINOPHIL 2.7 09/03/2023    BASOPHIL 0.6 09/03/2023    DIFFMETHOD Automated 09/03/2023       Lab Results   Component Value Date     12/26/2023    K 4.9 12/26/2023     12/26/2023    CO2 21 (L) 12/26/2023     (H) 12/26/2023    BUN 12 12/26/2023    CREATININE 0.9 12/26/2023    CALCIUM 9.1 12/26/2023 "    PROT 6.7 12/26/2023    ALBUMIN 3.7 12/26/2023    BILITOT 0.7 12/26/2023    ALKPHOS 55 12/26/2023    AST 15 12/26/2023    ALT 18 12/26/2023    ANIONGAP 8 12/26/2023    EGFRNORACEVR >60.0 12/26/2023        Lab Results   Component Value Date    PSADIAG <0.01 12/26/2023    PSADIAG <0.01 11/29/2023    PSADIAG <0.01 10/20/2023    PSADIAG <0.01 09/08/2023    PSADIAG <0.01 07/21/2023    PSADIAG <0.01 06/09/2023    PSADIAG <0.01 05/12/2023    PSADIAG <0.01 05/01/2023    PSADIAG <0.01 04/14/2023    PSADIAG <0.01 03/30/2023    PSATOTAL <0.01 09/08/2023    PSATOTAL <0.01 06/21/2023    PSATOTAL <0.01 03/22/2023    PSATOTAL 0.02 12/19/2022    PSATOTAL 2.8 06/06/2022        Cardiovascular Screening:  Primary care physician: Kosta Roberts MD      The 10-year ASCVD risk score (Niraj MCFADDEN, et al., 2019) is: 49.2%    Values used to calculate the score:      Age: 58 years      Sex: Male      Is Non- : Yes      Diabetic: Yes      Tobacco smoker: Yes      Systolic Blood Pressure: 164 mmHg      Is BP treated: Yes      HDL Cholesterol: 51 mg/dL      Total Cholesterol: 158 mg/dL    ASCVD Risk Level: High risk >= 20%    EKG:   Results for orders placed or performed during the hospital encounter of 11/29/23   EKG 12-lead    Collection Time: 11/29/23  5:59 PM    Narrative    Test Reason : N17.9,    Vent. Rate : 092 BPM     Atrial Rate : 092 BPM     P-R Int : 166 ms          QRS Dur : 074 ms      QT Int : 340 ms       P-R-T Axes : 045 023 -01 degrees     QTc Int : 420 ms    Normal sinus rhythm with sinus arrhythmia  Minimal voltage criteria for LVH, may be normal variant  Borderline Abnormal ECG  When compared with ECG of 30-APR-2023 07:50,  Non-specific change in ST segment in Inferior leads  T wave inversion now evident in Inferior leads  Confirmed by Ed BECK, Ruddy ([id number]) on 11/30/2023 2:59:52 PM    Referred By: [email or web address]   SELF           Confirmed By:Ruddy Ward MD       High blood pressure =  "Yes  Antihypertensive agents: amLODIPine - 10 MG  carvediloL - 25 MG  losartan - 100 MG   Comments:     DM2: Yes  Antidiabetic agents: insulin - 100 unit/mL (3 mL)   Comments:     Hyperlipidemia: Yes  Lipid lowering agents: atorvastatin - 40 MG   Comments:     Antiplatelet therapy: Yes  Agent: aspirin - 81 MG   Comment:     Body mass index is 28.7 kg/m².  If greater than 30, referral to nutrition    Lab Results   Component Value Date    CHOL 158 07/21/2023    LDLCALC 90.8 07/21/2023    HDL 51 07/21/2023    TRIG 81 07/21/2023    HGBA1C 7.0 (H) 05/12/2023        Bone Health    No results found for: "[id number]"     Results for orders placed during the hospital encounter of 03/14/23    DXA Bone Density with Vertebral Fracture    Impression  *Low bone mass (Osteopenia); FRAX calculations do not support treatment as osteoporosis.    RECOMMENDATIONS:  *Daily calcium intake 8565-2718 mg, dietary sources preferred; Vitamin D 2453-6623 IU daily.  *Weight bearing exercise and fall precautions.  *If dedicated imaging is negative for vertebral fracture, then no additional treatment is recommended at this time. If positive for fracture, would treat as osteoporosis.  *If patient smokes would recommend cessation.  *Repeat BMD in 2 years.    EXPLANATION OF RESULTS:  T-score compares these results to the average bone density of a 20-29 year-old of the same gender.    Z-score compares this result to the average bone density to people of the same age, gender, and race.    The amounts indicate the number of standard deviations above or below the mean.    * Osteoporosis is generally defined as having a T-score between less than or equal to -2.5.    * Low bone mass (osteopenia) is generally defined as having a T-score between -1.0 and -2.5.    * The normal range is generally defined as having a T-score greater than or equal to -1.0.    * Calculated FRAX scores for fracture risk prediction may not be accurate in the setting of certain " clinical factors such as pharmacologic therapy for osteoporosis, prior fragility fractures, high dose glucocorticoid use.      Electronically signed by: Tayla Amato MD  Date:    03/27/2023  Time:    07:46       Vitamin D: 1000 IU daily  Calcium: Recommend 4 servings of dairy daily     Osteopenia/Osteoporosis: Osteopenia    Bone strengthening agent: None     Staging Imaging     Results for orders placed during the hospital encounter of 06/21/22    NM PET CT F 18 PYL PSMA, Midthigh to Vertex    Impression  In this patient with prostate malignancy status post prostatectomy, there are tracer-avid left para-aortic, presacral/left common iliac, and left external iliac chain nodes as described above, concerning for recurrent metastatic disease.    Additional findings as above.    This report was flagged in Epic as abnormal.    I, Narayan Harding MD, attest that I reviewed and interpreted the images.    Electronically signed by resident: Harshil Castaneda  Date:    06/21/2022  Time:    15:45    Electronically signed by: Narayan Harding  Date:    06/21/2022  Time:    17:34       Results for orders placed during the hospital encounter of 04/27/21    NM Bone Scan Whole Body    Impression  There is abnormal increased radiotracer activity identified involving the maxilla and multiple paranasal sinuses likely related to periodontal disease and sinus disease. Recommend clinical correlation and further evaluation with maxillofacial CT as warranted.      Electronically signed by: Anshul Ayon MD  Date:    04/27/2021  Time:    13:19      No results found for this or any previous visit.       Results for orders placed during the hospital encounter of 04/30/23    CT Abdomen Pelvis With Contrast    Impression  No acute abdominal or pelvic pathology CT of the abdomen and pelvis with contrast.    Mild fatty infiltration of the liver.    Indeterminate adrenal lesions, which were not increased in size.    Indeterminate 11-12 mm left upper pole  renal cystic lesion.  Findings may represent a complex cyst.  If warranted, consider follow-up renal ultrasound when clinically appropriate.    Questionably a mildly thickened urinary bladder wall.  Findings may be due to infection or lower under distension.    Colonic diverticulosis.    This report was flagged in Epic as abnormal.    The preliminary and final reports are near concordant.      Electronically signed by: Anna Proctor  Date:    04/30/2023  Time:    19:04       I have personally reviewed the above imaging.     Path:   Reviewed pathology as documented above.    Genomic testing:     Germline genetic testing  No results found for this or any previous visit.     Somatic tumor genotyping:      ctDNA genotyping:  Miscellaneous Genomic Results       Tumor NGS Blood  Resulted: 5/21/23 Status: Final result       Detected - Pathogenic (2)      Gene Variant Allelic State & Phase   TP53 p.R282W - c.844C>T Missense variant - LOF --   TP53 p.L194R - c.581T>G Missense variant - LOF --                             Diagnoses:     1. Prostate cancer metastatic to intrapelvic lymph node    2. Androgen deprivation therapy    3. Chest pain, unspecified type    4. Anemia associated with malignant neoplastic disease    5. Long term current use of therapeutic drug    6. Gastroparesis due to DM    7. Mixed hyperlipidemia    8. Hypertension, unspecified type    9. Type 2 diabetes mellitus with diabetic polyneuropathy, without long-term current use of insulin    10. Osteopenia, unspecified location              Assessment and Plan:     1. Prostate cancer metastatic to intrapelvic lymph node  Overview:  Intermediate risk [place] 4+3 disease sp R1 prostatectomy 2015 with adjuvant RT presumably to prostate bed. Now with PSA recurrence and PSMA imaging with regional maren metastases and limited intraabdominal maren mets.Started intensified hormonal therapy with ADT + abiraterone 02/2023. Completed salvage RT with   Adolfo      Assessment & Plan:  - Con't /5  - Lupron tomorrow with Dr. Edmond.  - Safety labs 6 weeks; FU in 12 weeks  - Again provided phone number to schedule cancer genetics appt      2. Androgen deprivation therapy  Overview:  Current agent: Triptorelin 11.25 (through Dr. Edmond)  Start date: 10/2022  Last dose: 9/26/23  Next dose: 12/27/23  Duration of therapy: Minimum 2 years    Novel Androgen signaling inhibitors:   Agent: Abiraterone + prednisone 250/5  Start date: 02/2023        Assessment & Plan:  - Due for ADT shot with Dr. Edmond.      3. Chest pain, unspecified type  Assessment & Plan:  - Referral placed for cardiology    Orders:  -     Ambulatory referral/consult to Cardiology; Future; Expected date: 01/02/2024    4. Anemia associated with malignant neoplastic disease  -     VITAMIN B12; Future; Expected date: 12/26/2023  -     FOLATE; Future; Expected date: 12/26/2023  -     IRON AND TIBC; Future; Expected date: 12/26/2023  -     FERRITIN; Future; Expected date: 12/26/2023    5. Long term current use of therapeutic drug  -     VITAMIN B12; Future; Expected date: 12/26/2023  -     FOLATE; Future; Expected date: 12/26/2023    6. Gastroparesis due to DM  Overview:  Episodes of intractable N/V and abdominal pain every few months thought due to gastroparesis +/- cannibas hyperemesis.    Assessment & Plan:  - Has yet to schedule follow up with gastroenterology      7. Mixed hyperlipidemia  Overview:  Home medications include atorvastatin and aspirin for primary prophylaxis.      8. Hypertension, unspecified type  Overview:  Home medications include carvedilol, amlodipine, and losartan.     Assessment & Plan:  - BP elevated today in setting of large holiday meal last night  - CTM      9. Type 2 diabetes mellitus with diabetic polyneuropathy, without long-term current use of insulin  Overview:  Complicated by peripheral neuropathy and potentially gastroparesis. Currently on metformin and  lantus.      10. Osteopenia, unspecified location  Overview:  Low risk osteopenia by bone mineral density 03/2023.            Follow up:   Route Chart for Scheduling    Med Onc Chart Routing      Follow up with physician Whit Zuleta 12 weeks   Follow up with DONAVAN    Infusion scheduling note    Injection scheduling note    Labs CBC, CMP, PSA and iron and TIBC   Scheduling:  Preferred lab:  Lab interval:  6 weeks and 12 weeks; ferritin, B12, folate   Imaging    Pharmacy appointment    Other referrals       Additional referrals needed  Cardiology              Treatment Plan Information   OP ABIRATERONE DAILY PREDNISONE BID   Edward Zuleta MD   Upcoming Treatment Dates - OP ABIRATERONE DAILY PREDNISONE BID    No upcoming days in selected categories.         The above information has been reviewed with the patient and all questions have been answered to their apparent satisfaction.  They understand that they can call the clinic with any questions.    Edward Zuleta

## 2023-12-26 NOTE — PATIENT INSTRUCTIONS
PSA is under excellent control. Continue abiraterone + prednisone. Repeat labs in 6 weeks. I will follow up with you in 12 weeks.    Due for hormone shot tomorrow with Dr. Edmond.    To schedule genetics consultation, call She Flores  at [phone]

## 2023-12-26 NOTE — ASSESSMENT & PLAN NOTE
- Con't /5  - Lupron tomorrow with Dr. Edmond.  - Safety labs 6 weeks; FU in 12 weeks  - Again provided phone number to schedule cancer genetics appt

## 2023-12-27 ENCOUNTER — OFFICE VISIT (OUTPATIENT)
Dept: UROLOGY | Facility: CLINIC | Age: 58
End: 2023-12-27
Payer: MEDICAID

## 2023-12-27 VITALS
SYSTOLIC BLOOD PRESSURE: 137 MMHG | DIASTOLIC BLOOD PRESSURE: 82 MMHG | BODY MASS INDEX: 28.7 KG/M2 | WEIGHT: 205 LBS | HEART RATE: 69 BPM | HEIGHT: 71 IN

## 2023-12-27 DIAGNOSIS — D63.0 ANEMIA ASSOCIATED WITH MALIGNANT NEOPLASTIC DISEASE: Primary | ICD-10-CM

## 2023-12-27 DIAGNOSIS — C61 PROSTATE CANCER METASTATIC TO INTRAPELVIC LYMPH NODE: Primary | ICD-10-CM

## 2023-12-27 DIAGNOSIS — C80.1 ANEMIA ASSOCIATED WITH MALIGNANT NEOPLASTIC DISEASE: Primary | ICD-10-CM

## 2023-12-27 DIAGNOSIS — N52.9 PRIMARY ERECTILE DYSFUNCTION: ICD-10-CM

## 2023-12-27 DIAGNOSIS — C77.5 PROSTATE CANCER METASTATIC TO INTRAPELVIC LYMPH NODE: Primary | ICD-10-CM

## 2023-12-27 PROCEDURE — 99214 OFFICE O/P EST MOD 30 MIN: CPT | Mod: PBBFAC,PO | Performed by: UROLOGY

## 2023-12-27 PROCEDURE — 3079F PR MOST RECENT DIASTOLIC BLOOD PRESSURE 80-89 MM HG: ICD-10-PCS | Mod: CPTII,,, | Performed by: UROLOGY

## 2023-12-27 PROCEDURE — 4010F PR ACE/ARB THEARPY RXD/TAKEN: ICD-10-PCS | Mod: CPTII,,, | Performed by: UROLOGY

## 2023-12-27 PROCEDURE — 4010F ACE/ARB THERAPY RXD/TAKEN: CPT | Mod: CPTII,,, | Performed by: UROLOGY

## 2023-12-27 PROCEDURE — 99999PBSHW PR PBB SHADOW TECHNICAL ONLY FILED TO HB: ICD-10-PCS | Mod: JZ,PBBFAC,,

## 2023-12-27 PROCEDURE — 3079F DIAST BP 80-89 MM HG: CPT | Mod: CPTII,,, | Performed by: UROLOGY

## 2023-12-27 PROCEDURE — 99999 PR PBB SHADOW E&M-EST. PATIENT-LVL IV: ICD-10-PCS | Mod: PBBFAC,,, | Performed by: UROLOGY

## 2023-12-27 PROCEDURE — 3075F PR MOST RECENT SYSTOLIC BLOOD PRESS GE 130-139MM HG: ICD-10-PCS | Mod: CPTII,,, | Performed by: UROLOGY

## 2023-12-27 PROCEDURE — 99215 PR OFFICE/OUTPT VISIT, EST, LEVL V, 40-54 MIN: ICD-10-PCS | Mod: S$PBB,,, | Performed by: UROLOGY

## 2023-12-27 PROCEDURE — 3051F PR MOST RECENT HEMOGLOBIN A1C LEVEL 7.0 - < 8.0%: ICD-10-PCS | Mod: CPTII,,, | Performed by: UROLOGY

## 2023-12-27 PROCEDURE — 1160F RVW MEDS BY RX/DR IN RCRD: CPT | Mod: CPTII,,, | Performed by: UROLOGY

## 2023-12-27 PROCEDURE — 1159F MED LIST DOCD IN RCRD: CPT | Mod: CPTII,,, | Performed by: UROLOGY

## 2023-12-27 PROCEDURE — 99999 PR PBB SHADOW E&M-EST. PATIENT-LVL IV: CPT | Mod: PBBFAC,,, | Performed by: UROLOGY

## 2023-12-27 PROCEDURE — 1159F PR MEDICATION LIST DOCUMENTED IN MEDICAL RECORD: ICD-10-PCS | Mod: CPTII,,, | Performed by: UROLOGY

## 2023-12-27 PROCEDURE — 3075F SYST BP GE 130 - 139MM HG: CPT | Mod: CPTII,,, | Performed by: UROLOGY

## 2023-12-27 PROCEDURE — 3008F PR BODY MASS INDEX (BMI) DOCUMENTED: ICD-10-PCS | Mod: CPTII,,, | Performed by: UROLOGY

## 2023-12-27 PROCEDURE — 1160F PR REVIEW ALL MEDS BY PRESCRIBER/CLIN PHARMACIST DOCUMENTED: ICD-10-PCS | Mod: CPTII,,, | Performed by: UROLOGY

## 2023-12-27 PROCEDURE — 3051F HG A1C>EQUAL 7.0%<8.0%: CPT | Mod: CPTII,,, | Performed by: UROLOGY

## 2023-12-27 PROCEDURE — 96402 CHEMO HORMON ANTINEOPL SQ/IM: CPT | Mod: PBBFAC,PO

## 2023-12-27 PROCEDURE — 99215 OFFICE O/P EST HI 40 MIN: CPT | Mod: S$PBB,,, | Performed by: UROLOGY

## 2023-12-27 PROCEDURE — 99999PBSHW PR PBB SHADOW TECHNICAL ONLY FILED TO HB: Mod: JZ,PBBFAC,,

## 2023-12-27 PROCEDURE — 3008F BODY MASS INDEX DOCD: CPT | Mod: CPTII,,, | Performed by: UROLOGY

## 2023-12-27 RX ORDER — SITAGLIPTIN 100 MG/1
1 TABLET, FILM COATED ORAL DAILY
COMMUNITY
Start: 2023-09-24 | End: 2024-03-19

## 2023-12-27 RX ORDER — PEN NEEDLE, DIABETIC 31 GX5/16"
NEEDLE, DISPOSABLE MISCELLANEOUS
COMMUNITY
Start: 2023-10-03

## 2023-12-27 RX ADMIN — TRIPTORELIN PAMOATE 11.25 MG: KIT at 11:12

## 2023-12-27 NOTE — PATIENT INSTRUCTIONS
Patient to follow-up in 3 months for PSA test and evaluation.    Depending on evaluation per Dr. Grace's will determine whether or not patient continues hormonal ablation.  Patient will follow-up in 3 months with PSA and every 3 months for 2 years thereafter.  If the patient remains on hormonal ablation will wait to change follow-up until hormonal ablation has completed and then put him on the 2 year three-month cycle.  Of the 2 year 3 month cycle will go to a six-month cycle for follow-up monitoring.  Patient and his wife are aware and understand the plan.

## 2023-12-27 NOTE — PROGRESS NOTES
Subjective:      Patient ID: Mauro Benitez is a 58 y.o. male.    Chief Complaint: trelstar - 3 month    Mr. Bentiez is a 58-year-old gentleman with a history of prostate cancer.  The patient had his prostate removed in 2015 in [place].  There was extraprostatic extension and the patient had treatment with radiation to the prostatic bed.  Patient had been following up regularly and recently relocated to the area.  He was found have elevated PSA and PSMA scan revealed periaortic nodes involvement.  The patient underwent treatment with hormonal blockade and radiation therapy to this area.  The patient finishes radiation at the end of October and is here for follow-up.  He is due for Trelstar injection today.  We will monitor the patient and depending on what the radiation oncologist recommends will either.  Therapy by the next visit at 3 months will continue out for another 12 months.  Most likely we will stop the hormonal medication at the next visit and just monitor the patient's PSA every 3 months.    Other  This is a chronic (Metastatic prostate cancer to periaortic abdominal nodes.) problem. The current episode started more than 1 month ago. The problem occurs constantly. The problem has been resolved. Associated symptoms include a change in bowel habit (Patient had diarrhea with radiation therapy which is starting to resolve and improve.), fatigue and weakness. Pertinent negatives include no abdominal pain, anorexia, arthralgias, chest pain, chills, congestion, coughing, diaphoresis, fever, headaches, joint swelling, myalgias, nausea, neck pain, numbness, rash, sore throat, swollen glands, urinary symptoms, vertigo, visual change or vomiting.   Review of Systems   Constitutional:  Positive for fatigue. Negative for activity change, appetite change, chills, diaphoresis, fever and unexpected weight change.   HENT:  Negative for congestion, hearing loss, sinus pressure, sore throat and trouble swallowing.     Eyes:  Negative for photophobia, pain, discharge and visual disturbance.   Respiratory:  Negative for apnea, cough and shortness of breath.    Cardiovascular:  Negative for chest pain, palpitations and leg swelling.   Gastrointestinal:  Positive for change in bowel habit (Patient had diarrhea with radiation therapy which is starting to resolve and improve.). Negative for abdominal distention, abdominal pain, anal bleeding, anorexia, blood in stool, constipation, diarrhea, nausea, rectal pain and vomiting.   Endocrine: Negative for cold intolerance, heat intolerance, polydipsia, polyphagia and polyuria.   Genitourinary:  Negative for decreased urine volume, difficulty urinating, dysuria, enuresis, flank pain, frequency, genital sores, hematuria, penile discharge, penile pain, penile swelling, scrotal swelling, testicular pain and urgency.   Musculoskeletal:  Negative for arthralgias, back pain, joint swelling, myalgias and neck pain.   Skin:  Negative for color change, pallor, rash and wound.   Allergic/Immunologic: Negative for environmental allergies, food allergies and immunocompromised state.   Neurological:  Positive for weakness. Negative for dizziness, vertigo, seizures, numbness and headaches.   Hematological:  Negative for adenopathy. Does not bruise/bleed easily.   Psychiatric/Behavioral: Negative.      Objective:     Physical Exam  Vitals and nursing note reviewed.   Constitutional:       General: He is not in acute distress.     Appearance: Normal appearance. He is well-developed. He is not ill-appearing, toxic-appearing or diaphoretic.   HENT:      Head: Normocephalic and atraumatic.      Right Ear: External ear normal.      Left Ear: External ear normal.      Nose: Nose normal. No congestion or rhinorrhea.      Mouth/Throat:      Mouth: Mucous membranes are dry.      Pharynx: Oropharynx is clear. No oropharyngeal exudate or posterior oropharyngeal erythema.   Eyes:      General: No scleral icterus.         Right eye: No discharge.         Left eye: No discharge.      Extraocular Movements: Extraocular movements intact.      Conjunctiva/sclera: Conjunctivae normal.      Pupils: Pupils are equal, round, and reactive to light.   Cardiovascular:      Rate and Rhythm: Normal rate and regular rhythm.      Heart sounds: Normal heart sounds.   Pulmonary:      Effort: Pulmonary effort is normal.      Breath sounds: Normal breath sounds.   Abdominal:      General: Bowel sounds are normal.      Palpations: Abdomen is soft.      Tenderness: There is no right CVA tenderness or left CVA tenderness.   Genitourinary:     Penis: Normal.       Testes: Normal.   Musculoskeletal:         General: Normal range of motion.      Cervical back: Normal range of motion and neck supple.      Right lower leg: No edema.      Left lower leg: No edema.   Skin:     General: Skin is warm and dry.      Capillary Refill: Capillary refill takes less than 2 seconds.   Neurological:      General: No focal deficit present.      Mental Status: He is alert and oriented to person, place, and time. Mental status is at baseline.      Deep Tendon Reflexes: Reflexes are normal and symmetric.   Psychiatric:         Mood and Affect: Mood normal.         Behavior: Behavior normal.         Thought Content: Thought content normal.         Judgment: Judgment normal.      Assessment:      1. Prostate cancer metastatic to intrapelvic lymph node    2. Primary erectile dysfunction      Plan:     Patient Instructions   Patient to follow-up in 3 months for PSA test and evaluation.    Depending on evaluation per Dr. Grace's will determine whether or not patient continues hormonal ablation.  Patient will follow-up in 3 months with PSA and every 3 months for 2 years thereafter.  If the patient remains on hormonal ablation will wait to change follow-up until hormonal ablation has completed and then put him on the 2 year three-month cycle.  Of the 2 year 3 month cycle will  go to a six-month cycle for follow-up monitoring.  Patient and his wife are aware and understand the plan.

## 2024-01-08 ENCOUNTER — TELEPHONE (OUTPATIENT)
Dept: HEMATOLOGY/ONCOLOGY | Facility: CLINIC | Age: 59
End: 2024-01-08
Payer: MEDICAID

## 2024-01-08 NOTE — TELEPHONE ENCOUNTER
-Responded to pt. message called back and left message to schedule appointment.---- Message from Jolanta Lee MA sent at 1/8/2024  2:08 PM CST -----  Regarding: FW: Appt  Contact: Pt  [phone]    ----- Message -----  From: She Arrington  Sent: 1/8/2024   2:04 PM CST  To: [facility] Clinical Support Staff  Subject: Appt                                                         Appt Type:  Np      Date/Time Preference:  Next allyn     Referring  Provider: Edward Zuleta MD     Caller Name: Mauro     Contact Prefer: [phone]    Comments/Notes:  Called to schedule appt

## 2024-01-29 PROBLEM — R11.2 NAUSEA AND VOMITING: Status: ACTIVE | Noted: 2023-05-05

## 2024-01-29 PROBLEM — N18.31 CHRONIC KIDNEY DISEASE (CKD) STAGE G3A/A1, MODERATELY DECREASED GLOMERULAR FILTRATION RATE (GFR) BETWEEN 45-59 ML/MIN/1.73 SQUARE METER AND ALBUMINURIA CREATININE RATIO LESS THAN 30 MG/G: Status: ACTIVE | Noted: 2024-01-29

## 2024-01-29 PROBLEM — E86.0 DEHYDRATION: Status: ACTIVE | Noted: 2024-01-29

## 2024-01-31 ENCOUNTER — OFFICE VISIT (OUTPATIENT)
Dept: RADIATION ONCOLOGY | Facility: CLINIC | Age: 59
End: 2024-01-31
Payer: MEDICAID

## 2024-01-31 VITALS
HEART RATE: 73 BPM | SYSTOLIC BLOOD PRESSURE: 157 MMHG | RESPIRATION RATE: 16 BRPM | HEIGHT: 71 IN | WEIGHT: 203.06 LBS | BODY MASS INDEX: 28.43 KG/M2 | DIASTOLIC BLOOD PRESSURE: 77 MMHG

## 2024-01-31 DIAGNOSIS — C77.5 PROSTATE CANCER METASTATIC TO INTRAPELVIC LYMPH NODE: Primary | ICD-10-CM

## 2024-01-31 DIAGNOSIS — C61 PROSTATE CANCER METASTATIC TO INTRAPELVIC LYMPH NODE: Primary | ICD-10-CM

## 2024-01-31 PROCEDURE — 99499 UNLISTED E&M SERVICE: CPT | Mod: S$PBB,,, | Performed by: RADIOLOGY

## 2024-01-31 PROCEDURE — 99214 OFFICE O/P EST MOD 30 MIN: CPT | Mod: PBBFAC | Performed by: RADIOLOGY

## 2024-01-31 PROCEDURE — 99999 PR PBB SHADOW E&M-EST. PATIENT-LVL IV: CPT | Mod: PBBFAC,,, | Performed by: RADIOLOGY

## 2024-01-31 NOTE — PROGRESS NOTES
Multidisciplinary Uro-Oncology Clinic  [facility] - Radiation Oncology     Patient ID: Mauro Benitez is a 58 y.o. male.    Chief Complaint: Prostate Cancer (F/u after xrt;psa)    Mr. Benitez has a history of recurrent prostate cancer.  He initially presented in 2015 with increased PSA of 14 ng/ml. Biopsies revealed [place] 6 and 7 prostate cancer.  In November of 2015 he underwent RRP which revealed [place] 7 (4+3) adenocarcinoma involving > 50% of the prostate with extraprostatic extension and a positive margins.  Pathologic stage III (T3a, N0, M0) disease.  Postoperative PSA was 0.36 and he received adjuvant radiotherapy to the prostate bed (68.4 Gy) with hormonal therapy for 6 months.  He relocated to [place].  Repeat PSA in June of 2022 returned at 2.8 ng/ml.  PSMA scan revealed tracer-avid left para-aortic, presacral/left common iliac, and left external iliac chain nodes.  The patient began hormonal therapy.  He completed radiotherapy to the pelvic nodes on 10/26/23.  Today the patient states he feels well.  No significant  or GI complaints.  Notes fatigue. Continues on intensive hormonal therapy.       Review of Systems   Constitutional:  Positive for fatigue. Negative for activity change, appetite change and chills.   Respiratory:  Negative for cough and shortness of breath.    Cardiovascular:  Negative for chest pain and palpitations.   Gastrointestinal:  Negative for abdominal pain, change in bowel habit, constipation and diarrhea.   Genitourinary:  Negative for bladder incontinence, difficulty urinating, dysuria, frequency and hematuria.     Physical Exam  Constitutional:       General: He is not in acute distress.     Appearance: Normal appearance.   Pulmonary:      Breath sounds: Normal breath sounds.   Abdominal:      General: Abdomen is flat. There is no distension.   Neurological:      Mental Status: He is alert and oriented to person, place, and time.   Psychiatric:          Mood and Affect: Mood normal.         Judgment: Judgment normal.       PSA < 0.01 ng/ml     Assessment and Plan   Recurrent prostate cancer. recovered from the acute effects of radiotherapy.  Continue hormonal therapy likely until September of 2024.  Will discuss with Rusty Edmond and Merlyn.  Plan follow up with us PRN

## 2024-02-19 DIAGNOSIS — C61 PROSTATE CANCER: ICD-10-CM

## 2024-02-19 RX ORDER — ABIRATERONE ACETATE 250 MG/1
250 TABLET ORAL DAILY
Qty: 30 TABLET | Refills: 11 | Status: ACTIVE | OUTPATIENT
Start: 2024-02-19

## 2024-02-19 RX ORDER — PREDNISONE 5 MG/1
5 TABLET ORAL DAILY
Qty: 30 TABLET | Refills: 11 | Status: ACTIVE | OUTPATIENT
Start: 2024-02-19

## 2024-03-19 ENCOUNTER — LAB VISIT (OUTPATIENT)
Dept: LAB | Facility: HOSPITAL | Age: 59
End: 2024-03-19
Attending: HOSPITALIST
Payer: MEDICAID

## 2024-03-19 ENCOUNTER — OFFICE VISIT (OUTPATIENT)
Dept: HEMATOLOGY/ONCOLOGY | Facility: CLINIC | Age: 59
End: 2024-03-19
Payer: MEDICAID

## 2024-03-19 ENCOUNTER — TELEPHONE (OUTPATIENT)
Dept: UROLOGY | Facility: CLINIC | Age: 59
End: 2024-03-19
Payer: MEDICAID

## 2024-03-19 VITALS
SYSTOLIC BLOOD PRESSURE: 141 MMHG | BODY MASS INDEX: 27.99 KG/M2 | OXYGEN SATURATION: 100 % | HEART RATE: 80 BPM | TEMPERATURE: 98 F | WEIGHT: 199.94 LBS | HEIGHT: 71 IN | DIASTOLIC BLOOD PRESSURE: 78 MMHG

## 2024-03-19 DIAGNOSIS — E55.9 VITAMIN D DEFICIENCY: ICD-10-CM

## 2024-03-19 DIAGNOSIS — D63.0 ANEMIA ASSOCIATED WITH MALIGNANT NEOPLASTIC DISEASE: ICD-10-CM

## 2024-03-19 DIAGNOSIS — C61 PROSTATE CANCER METASTATIC TO INTRAPELVIC LYMPH NODE: Primary | ICD-10-CM

## 2024-03-19 DIAGNOSIS — C80.1 ANEMIA ASSOCIATED WITH MALIGNANT NEOPLASTIC DISEASE: ICD-10-CM

## 2024-03-19 DIAGNOSIS — R07.9 CHEST PAIN, UNSPECIFIED TYPE: ICD-10-CM

## 2024-03-19 DIAGNOSIS — I10 HYPERTENSION, UNSPECIFIED TYPE: ICD-10-CM

## 2024-03-19 DIAGNOSIS — Z79.899 LONG TERM CURRENT USE OF THERAPEUTIC DRUG: ICD-10-CM

## 2024-03-19 DIAGNOSIS — Z79.818 ANDROGEN DEPRIVATION THERAPY: ICD-10-CM

## 2024-03-19 DIAGNOSIS — E11.42 TYPE 2 DIABETES MELLITUS WITH DIABETIC POLYNEUROPATHY, WITHOUT LONG-TERM CURRENT USE OF INSULIN: ICD-10-CM

## 2024-03-19 DIAGNOSIS — C77.5 PROSTATE CANCER METASTATIC TO INTRAPELVIC LYMPH NODE: Primary | ICD-10-CM

## 2024-03-19 DIAGNOSIS — C61 PROSTATE CANCER: ICD-10-CM

## 2024-03-19 DIAGNOSIS — E78.2 MIXED HYPERLIPIDEMIA: ICD-10-CM

## 2024-03-19 LAB
ALBUMIN SERPL BCP-MCNC: 3.6 G/DL (ref 3.5–5.2)
ALP SERPL-CCNC: 81 U/L (ref 55–135)
ALT SERPL W/O P-5'-P-CCNC: 12 U/L (ref 10–44)
ANION GAP SERPL CALC-SCNC: 8 MMOL/L (ref 8–16)
AST SERPL-CCNC: 13 U/L (ref 10–40)
BILIRUB SERPL-MCNC: 0.5 MG/DL (ref 0.1–1)
BUN SERPL-MCNC: 15 MG/DL (ref 6–20)
CALCIUM SERPL-MCNC: 9.4 MG/DL (ref 8.7–10.5)
CHLORIDE SERPL-SCNC: 108 MMOL/L (ref 95–110)
CO2 SERPL-SCNC: 23 MMOL/L (ref 23–29)
COMPLEXED PSA SERPL-MCNC: <0.01 NG/ML (ref 0–4)
CREAT SERPL-MCNC: 1 MG/DL (ref 0.5–1.4)
ERYTHROCYTE [DISTWIDTH] IN BLOOD BY AUTOMATED COUNT: 13.7 % (ref 11.5–14.5)
EST. GFR  (NO RACE VARIABLE): >60 ML/MIN/1.73 M^2
FERRITIN SERPL-MCNC: 68 NG/ML (ref 20–300)
FOLATE SERPL-MCNC: 9.6 NG/ML (ref 4–24)
GLUCOSE SERPL-MCNC: 124 MG/DL (ref 70–110)
HCT VFR BLD AUTO: 31.7 % (ref 40–54)
HGB BLD-MCNC: 9.7 G/DL (ref 14–18)
IMM GRANULOCYTES # BLD AUTO: 0.06 K/UL (ref 0–0.04)
IRON SERPL-MCNC: 84 UG/DL (ref 45–160)
MCH RBC QN AUTO: 30.9 PG (ref 27–31)
MCHC RBC AUTO-ENTMCNC: 30.6 G/DL (ref 32–36)
MCV RBC AUTO: 101 FL (ref 82–98)
NEUTROPHILS # BLD AUTO: 6.6 K/UL (ref 1.8–7.7)
PLATELET # BLD AUTO: 302 K/UL (ref 150–450)
PMV BLD AUTO: 9.9 FL (ref 9.2–12.9)
POTASSIUM SERPL-SCNC: 5.2 MMOL/L (ref 3.5–5.1)
PROT SERPL-MCNC: 7.1 G/DL (ref 6–8.4)
RBC # BLD AUTO: 3.14 M/UL (ref 4.6–6.2)
SATURATED IRON: 26 % (ref 20–50)
SODIUM SERPL-SCNC: 139 MMOL/L (ref 136–145)
TOTAL IRON BINDING CAPACITY: 329 UG/DL (ref 250–450)
TRANSFERRIN SERPL-MCNC: 222 MG/DL (ref 200–375)
VIT B12 SERPL-MCNC: 689 PG/ML (ref 210–950)
WBC # BLD AUTO: 7.95 K/UL (ref 3.9–12.7)

## 2024-03-19 PROCEDURE — 36415 COLL VENOUS BLD VENIPUNCTURE: CPT | Performed by: HOSPITALIST

## 2024-03-19 PROCEDURE — 85027 COMPLETE CBC AUTOMATED: CPT | Performed by: HOSPITALIST

## 2024-03-19 PROCEDURE — 3077F SYST BP >= 140 MM HG: CPT | Mod: CPTII,,, | Performed by: HOSPITALIST

## 2024-03-19 PROCEDURE — 3066F NEPHROPATHY DOC TX: CPT | Mod: CPTII,,, | Performed by: HOSPITALIST

## 2024-03-19 PROCEDURE — 83540 ASSAY OF IRON: CPT | Performed by: HOSPITALIST

## 2024-03-19 PROCEDURE — 82746 ASSAY OF FOLIC ACID SERUM: CPT | Performed by: HOSPITALIST

## 2024-03-19 PROCEDURE — 1159F MED LIST DOCD IN RCRD: CPT | Mod: CPTII,,, | Performed by: HOSPITALIST

## 2024-03-19 PROCEDURE — 3008F BODY MASS INDEX DOCD: CPT | Mod: CPTII,,, | Performed by: HOSPITALIST

## 2024-03-19 PROCEDURE — 4010F ACE/ARB THERAPY RXD/TAKEN: CPT | Mod: CPTII,,, | Performed by: HOSPITALIST

## 2024-03-19 PROCEDURE — 99215 OFFICE O/P EST HI 40 MIN: CPT | Mod: S$PBB,,, | Performed by: HOSPITALIST

## 2024-03-19 PROCEDURE — 3078F DIAST BP <80 MM HG: CPT | Mod: CPTII,,, | Performed by: HOSPITALIST

## 2024-03-19 PROCEDURE — 82728 ASSAY OF FERRITIN: CPT | Performed by: HOSPITALIST

## 2024-03-19 PROCEDURE — 99999 PR PBB SHADOW E&M-EST. PATIENT-LVL V: CPT | Mod: PBBFAC,,, | Performed by: HOSPITALIST

## 2024-03-19 PROCEDURE — 84153 ASSAY OF PSA TOTAL: CPT | Performed by: HOSPITALIST

## 2024-03-19 PROCEDURE — 99215 OFFICE O/P EST HI 40 MIN: CPT | Mod: PBBFAC | Performed by: HOSPITALIST

## 2024-03-19 PROCEDURE — 82607 VITAMIN B-12: CPT | Performed by: HOSPITALIST

## 2024-03-19 PROCEDURE — 80053 COMPREHEN METABOLIC PANEL: CPT | Performed by: HOSPITALIST

## 2024-03-19 RX ORDER — AMITRIPTYLINE HYDROCHLORIDE 25 MG/1
25 TABLET, FILM COATED ORAL NIGHTLY
COMMUNITY
Start: 2024-02-19

## 2024-03-19 RX ORDER — OMEPRAZOLE 20 MG/1
20 CAPSULE, DELAYED RELEASE ORAL
COMMUNITY
Start: 2024-02-19

## 2024-03-19 RX ORDER — VENLAFAXINE HYDROCHLORIDE 37.5 MG/1
37.5 CAPSULE, EXTENDED RELEASE ORAL
COMMUNITY
Start: 2024-02-19 | End: 2024-06-18

## 2024-03-19 RX ORDER — SILDENAFIL 100 MG/1
100 TABLET, FILM COATED ORAL
COMMUNITY
Start: 2024-02-19 | End: 2024-06-18

## 2024-03-19 NOTE — ASSESSMENT & PLAN NOTE
- Con't /5  - Lupron tomorrow with Dr. Edmond.  - Safety labs 6 weeks; FU in 12 weeks  - Anticipating completing therapy 10/2024  - Has been referred multiple times to cancer genetics

## 2024-03-19 NOTE — PROGRESS NOTES
ADVANCED PROSTATE CANCER CLINIC - FOLLOW UP     Best Contact Phone Number(s): [phone] (home)       Cancer/Stage/TNM:    Cancer Staging   Prostate cancer metastatic to intrapelvic lymph node  Staging form: Prostate, AJCC 8th Edition  - Clinical: Stage IVB (cTX, cN1, cM1a, PSA: 2.8, Grade Group: 3) - Signed by Edward Zuleta MD on 2/1/2023        Reason for visit: Metachronous recurrence of prostate cancer following RALP and adjuvant RT to pelvic LN on ADT + AAP and salvage maren RT.    HPI: Mauro Benitez is a 58 y.o. male with history of prostate cancer sp RALP with positive margins 11/2/2015 with adjuvant RT to the prostate bed. He was subsequently found to have BCR with PSA 2.8  and maren relapse on PSMA imaging 06/2022. He started ADT 10/2022 with addition of /5 started 02/2023 with salvage RT to pelvic nodes 09/2023 - 10/2023.  Medical course notable for recurrent nausea and vomiting thought due to gastroparesis. He presents to medical oncology clinic for routine follow up.     History has been obtained by chart review and discussion with the patient.    Interval Events:     Established with nephrology. Recommended IVF every 2 weeks for prophylaxis against dehydration. Had two infusions last month. Planning on seeing them soon.    Reports good compliance with abiraterone. Continues to have occaisional episodes of atypical chest pain - typically post-prandial and resolves spontaneously. No palpitations. Had normal EKG 01/2024. No leg swelling. Has ongoing lower back pain for which he uses. Using APAP prn.     No urinary hesitancy; somewhat weak flow. Has nocturia about once per night. No signficant hot flashes; energy is low.     Receives Lupron with Brogle; next appt tomorrow. Last ADT in 3 months to complete 2 years of treatment.       Oncology History   Prostate cancer metastatic to intrapelvic lymph node   10/12/2015 Imaging Significant Findings    CT a/p: No evidence of distant or maren  metastatic disease    Bone scan: No evidence of metastatic disease. 1cm rounded area of uptake along left superomedial orbit/sphenoid thought possibly calcified menigioma.     11/2/2015 Surgery    Radical prostatectomy at [facility] in [place]. Pathology with prostate adenocarcinoma up to Jagdeep 4+3 with associated PNI. Positive margin. 0/4 LN involved. Staged pT3aN0     2/23/2016 Imaging Significant Findings    Nuclear bone scan: No evidence of metastatic disease. Note made of uptake in left supraorbital region thought c/w left frontal sinusitis along with uptake in mandible and maxilla c/w periosteal disease.     4/27/2021 Imaging Significant Findings    Bone scan:  There is abnormal increased radiotracer activity identified involving the maxilla and multiple paranasal sinuses likely related to periodontal disease and sinus disease. Recommend clinical correlation and further evaluation with maxillofacial CT as warranted.     6/6/2022 Tumor Markers    PSA 2.8     6/21/2022 Imaging Significant Findings    PSMA PET-CT: In this patient with prostate malignancy status post prostatectomy, there are tracer-avid left para-aortic, presacral/left common iliac, and left external iliac chain nodes as described above, concerning for recurrent metastatic disease.     9/16/2022 -  Hormone Therapy    Start bicalutamide 50mg po daily     10/3/2022 -  Hormone Therapy    Trelstar 11.25mg IM x1     12/19/2022 Tumor Markers    PSA 0.02     1/4/2023 -  Hormone Therapy    Trelstart 11.25mg IM x1 with Dr. Edmond     2/1/2023 -  Chemotherapy    Treatment Summary   Plan Name: OP ABIRATERONE DAILY PREDNISONE BID  Treatment Goal: Curative  Status: Active  Start Date: 2/1/2023  End Date: 2/1/2023  Provider: Edward Zuleta MD  Chemotherapy: abiraterone (ZYTIGA) 250 mg Tab, 250 mg (100 % of original dose 250 mg), Oral, Daily, 1 of 1 cycle, Start date: 2/1/2023, End date: --  Dose modification: 250 mg (original dose 250  "mg, Cycle 1)     3/27/2023 Imaging Significant Findings    DEXA - Low risk osteopenia; repeat 2 years     3/29/2023 -  Hormone Therapy    Triptorelin 11.25mg     9/19/2023 - 10/26/2023 Radiation Therapy    Treating physician: Jagdish Mata  Total Dose: 50.4 Gy to the pelvic nodes  61.6 Gy to the PET + nodes   Fractions: 28 fractions at 1.8 and 2.2 Gy per fraction           Past Medical History:   Diagnosis Date    Diabetes mellitus     GERD (gastroesophageal reflux disease)     Hypertension     Prostate cancer          Past Surgical History:   Procedure Laterality Date    ESOPHAGOGASTRODUODENOSCOPY N/A 12/5/2022    Procedure: EGD (ESOPHAGOGASTRODUODENOSCOPY);  Surgeon: Beronica Valera MD;  Location: [facility];  Service: Endoscopy;  Laterality: N/A;    PROSTATECTOMY           I have reviewed and updated the patient's past medical, surgical, family and social histories.     Review of patient's allergies indicates:   Allergen Reactions    Lisinopril Shortness Of Breath         Current Outpatient Medications   Medication Sig Dispense Refill    abiraterone (ZYTIGA) 250 mg Tab Take 1 tablet (250 mg total) by mouth once daily Take as directed with a low fat meal. 30 tablet 11    acetaminophen (TYLENOL) 500 MG tablet Take 1,500 mg by mouth daily as needed for Pain.      amitriptyline (ELAVIL) 25 MG tablet Take 25 mg by mouth every evening.      amLODIPine (NORVASC) 10 MG tablet Take 10 mg by mouth every evening.      aspirin (ECOTRIN) 81 MG EC tablet Aspir-81 mg tablet,delayed release   Take 1 tablet every day by oral route in the morning for 30 days.      atorvastatin (LIPITOR) 40 MG tablet atorvastatin 40 mg tablet   TAKE 1 TABLET BY MOUTH EVERY DAY AT BEDTIME      BD ULTRA-FINE XIAO PEN NEEDLE 32 gauge x 5/32" Ndle USE AS DIRECTED DAILY.      blood sugar diagnostic (ONETOUCH VERIO TEST STRIPS MISC) OneTouch Verio test strips      blood sugar diagnostic (ONETOUCH VERIO TEST STRIPS MISC) OneTouch Verio test strips   " USE 1 TEST STRIP TO CHECK BLOOD SUGAR TWICE DAILY      blood sugar diagnostic Strp Accu-Chek Odalis Plus test strips   Take 1 strip twice a day by miscell. route.      carvediloL (COREG) 25 MG tablet Take 1 tablet (25 mg total) by mouth 2 (two) times daily. 60 tablet 11    cholecalciferol, vitamin D3, (VITAMIN D3) 25 mcg (1,000 unit) capsule Take 1 capsule (1,000 Units total) by mouth once daily. 30 capsule 11    fluticasone propionate (FLONASE) 50 mcg/actuation nasal spray fluticasone propionate 50 mcg/actuation nasal spray,suspension   SPRAY 2 SPRAYS EVERY DAY BY INTRANASAL ROUTE AS NEEDED.      insulin glargine 100 units/mL SubQ pen Lantus Solostar U-100 Insulin 100 unit/mL (3 mL) subcutaneous pen   Inject 10 units every day by subcutaneous route in the evening.      losartan (COZAAR) 100 MG tablet Take 100 mg by mouth once daily.      metFORMIN (GLUCOPHAGE) 1000 MG tablet metformin 1,000 mg tablet   TAKE 1 TABLET BY MOUTH TWICE DAILY      mirabegron (MYRBETRIQ) 25 mg Tb24 ER tablet Take 1 tablet (25 mg total) by mouth once daily. 30 tablet 11    omeprazole (PRILOSEC) 20 MG capsule Take 20 mg by mouth.      ondansetron (ZOFRAN-ODT) 4 MG TbDL Take 2 tablets (8 mg total) by mouth every 8 (eight) hours as needed. 30 tablet 0    predniSONE (DELTASONE) 5 MG tablet Take 1 tablet (5 mg total) by mouth once daily. Take as directed with water on an empty stomach. 30 tablet 11    pregabalin (LYRICA) 50 MG capsule pregabalin 50 mg capsule   Take 1 capsule 3 times a day by oral route.   Taper off of gabapentin first      prochlorperazine (COMPAZINE) 10 MG tablet Take 1 tablet (10 mg total) by mouth every 6 (six) hours as needed (nausea). 24 tablet 0    sildenafiL (VIAGRA) 100 MG tablet Take 100 mg by mouth as needed.      venlafaxine (EFFEXOR-XR) 37.5 MG 24 hr capsule Take 37.5 mg by mouth.       Current Facility-Administered Medications   Medication Dose Route Frequency Provider Last Rate Last Admin    triptorelin pamoate  "SusR 11.25 mg  11.25 mg Intramuscular 1 time in Clinic/HOD Edward Edmond MD            Objective:      Physical Exam:   BP (!) 141/78 (BP Location: Left arm, Patient Position: Sitting, BP Method: Medium (Automatic))   Pulse 80   Temp 98 °F (36.7 °C) (Oral)   Ht 5' 11" (1.803 m)   Wt 90.7 kg (199 lb 15.3 oz)   SpO2 100%   BMI 27.89 kg/m²       ECOG Performance status: (1) Restricted in physically strenuous activity, ambulatory and able to do work of light nature     Physical Exam  Vitals and nursing note reviewed.   Constitutional:       Appearance: He is not ill-appearing.   Cardiovascular:      Rate and Rhythm: Normal rate and regular rhythm.      Pulses: Normal pulses.      Heart sounds: Normal heart sounds.   Pulmonary:      Effort: Pulmonary effort is normal.      Breath sounds: Normal breath sounds.   Abdominal:      General: Abdomen is flat.      Palpations: Abdomen is soft.   Neurological:      General: No focal deficit present.      Mental Status: He is oriented to person, place, and time.   Psychiatric:         Mood and Affect: Mood normal.          Recent Labs:   Lab Results   Component Value Date    WBC 7.95 03/19/2024    RBC 3.14 (L) 03/19/2024    HGB 9.7 (L) 03/19/2024    HCT 31.7 (L) 03/19/2024     (H) 03/19/2024    MCH 30.9 03/19/2024    MCHC 30.6 (L) 03/19/2024    RDW 13.7 03/19/2024     03/19/2024    MPV 9.9 03/19/2024    IMMGR 0.5 02/05/2024    GRAN 6.6 03/19/2024    IGABS 0.06 (H) 03/19/2024    LYMPH 0.5 (L) 02/05/2024    LYMPH 7.7 (L) 02/05/2024    MONO 0.4 02/05/2024    MONO 6.7 02/05/2024    EOS 0.1 02/05/2024    BASO 0.02 02/05/2024    NRBC 0 02/05/2024    EOSINOPHIL 1.6 02/05/2024    BASOPHIL 0.3 02/05/2024    DIFFMETHOD Automated 02/05/2024       Lab Results   Component Value Date     03/19/2024    K 5.2 (H) 03/19/2024     03/19/2024    CO2 23 03/19/2024     (H) 03/19/2024    BUN 15 03/19/2024    CREATININE 1.0 03/19/2024    CALCIUM 9.4 03/19/2024 "    PROT 7.1 03/19/2024    ALBUMIN 3.6 03/19/2024    BILITOT 0.5 03/19/2024    ALKPHOS 81 03/19/2024    AST 13 03/19/2024    ALT 12 03/19/2024    ANIONGAP 8 03/19/2024    EGFRNORACEVR >60.0 03/19/2024        Lab Results   Component Value Date    PSADIAG <0.01 03/19/2024    PSADIAG <0.01 02/05/2024    PSADIAG <0.01 12/26/2023    PSADIAG <0.01 11/29/2023    PSADIAG <0.01 10/20/2023    PSADIAG <0.01 09/08/2023    PSADIAG <0.01 07/21/2023    PSADIAG <0.01 06/09/2023    PSADIAG <0.01 05/12/2023    PSADIAG <0.01 05/01/2023    PSATOTAL <0.01 09/08/2023    PSATOTAL <0.01 06/21/2023    PSATOTAL <0.01 03/22/2023    PSATOTAL 0.02 12/19/2022    PSATOTAL 2.8 06/06/2022        Cardiovascular Screening:  Primary care physician: Kosta Roberts MD      The 10-year ASCVD risk score (Niraj MCFADDEN, et al., 2019) is: 39.8%    Values used to calculate the score:      Age: 58 years      Sex: Male      Is Non- : Yes      Diabetic: Yes      Tobacco smoker: Yes      Systolic Blood Pressure: 141 mmHg      Is BP treated: Yes      HDL Cholesterol: 51 mg/dL      Total Cholesterol: 158 mg/dL    ASCVD Risk Level: High risk >= 20%    EKG:   Results for orders placed or performed during the hospital encounter of 01/07/24   EKG 12-lead    Collection Time: 01/07/24  5:47 PM    Narrative    Test Reason : R11.2,    Vent. Rate : 065 BPM     Atrial Rate : 065 BPM     P-R Int : 156 ms          QRS Dur : 072 ms      QT Int : 420 ms       P-R-T Axes : 032 035 037 degrees     QTc Int : 436 ms    Normal sinus rhythm  Normal ECG  When compared with ECG of 29-NOV-2023 17:59,  T wave inversion no longer evident in Inferior leads  Confirmed by Yousef MD, Yoan M. ([id number]) on 1/8/2024 4:50:52 PM    Referred By: [email or web address]   SELF           Confirmed By:Yoan Hodge MD       High blood pressure = Yes  Antihypertensive agents: amLODIPine - 10 MG  carvediloL - 25 MG  losartan - 100 MG   Comments:     DM2: Yes  Antidiabetic agents: insulin - 100  unit/mL (3 mL)  metFORMIN - 1000 MG   Comments:     Hyperlipidemia: Yes  Lipid lowering agents: atorvastatin - 40 MG   Comments:     Antiplatelet therapy: Yes  Agent: aspirin - 81 MG   Comment:     Body mass index is 27.89 kg/m².  If greater than 30, referral to nutrition    Lab Results   Component Value Date    CHOL 158 07/21/2023    LDLCALC 90.8 07/21/2023    HDL 51 07/21/2023    TRIG 81 07/21/2023    HGBA1C 7.0 (H) 05/12/2023        Bone Health    Lab Results   Component Value Date    YLZTKCVF26HS 30 01/23/2024        Results for orders placed during the hospital encounter of 03/14/23    DXA Bone Density with Vertebral Fracture    Impression  *Low bone mass (Osteopenia); FRAX calculations do not support treatment as osteoporosis.    RECOMMENDATIONS:  *Daily calcium intake 8318-1179 mg, dietary sources preferred; Vitamin D 7412-5987 IU daily.  *Weight bearing exercise and fall precautions.  *If dedicated imaging is negative for vertebral fracture, then no additional treatment is recommended at this time. If positive for fracture, would treat as osteoporosis.  *If patient smokes would recommend cessation.  *Repeat BMD in 2 years.    EXPLANATION OF RESULTS:  T-score compares these results to the average bone density of a 20-29 year-old of the same gender.    Z-score compares this result to the average bone density to people of the same age, gender, and race.    The amounts indicate the number of standard deviations above or below the mean.    * Osteoporosis is generally defined as having a T-score between less than or equal to -2.5.    * Low bone mass (osteopenia) is generally defined as having a T-score between -1.0 and -2.5.    * The normal range is generally defined as having a T-score greater than or equal to -1.0.    * Calculated FRAX scores for fracture risk prediction may not be accurate in the setting of certain clinical factors such as pharmacologic therapy for osteoporosis, prior fragility fractures, high  dose glucocorticoid use.      Electronically signed by: Tayla Amato MD  Date:    03/27/2023  Time:    07:46       Vitamin D: 1000 IU daily  Calcium: Recommend 4 servings of dairy daily     Osteopenia/Osteoporosis: Osteopenia    Bone strengthening agent: None     Staging Imaging     Results for orders placed during the hospital encounter of 06/21/22    NM PET CT F 18 PYL PSMA, Midthigh to Vertex    Impression  In this patient with prostate malignancy status post prostatectomy, there are tracer-avid left para-aortic, presacral/left common iliac, and left external iliac chain nodes as described above, concerning for recurrent metastatic disease.    Additional findings as above.    This report was flagged in Epic as abnormal.    I, Narayan Harding MD, attest that I reviewed and interpreted the images.    Electronically signed by resident: Harshil Castaneda  Date:    06/21/2022  Time:    15:45    Electronically signed by: Narayan Harding  Date:    06/21/2022  Time:    17:34       Results for orders placed during the hospital encounter of 04/27/21    NM Bone Scan Whole Body    Impression  There is abnormal increased radiotracer activity identified involving the maxilla and multiple paranasal sinuses likely related to periodontal disease and sinus disease. Recommend clinical correlation and further evaluation with maxillofacial CT as warranted.      Electronically signed by: Anshul Ayon MD  Date:    04/27/2021  Time:    13:19      No results found for this or any previous visit.       Results for orders placed during the hospital encounter of 04/30/23    CT Abdomen Pelvis With Contrast    Impression  No acute abdominal or pelvic pathology CT of the abdomen and pelvis with contrast.    Mild fatty infiltration of the liver.    Indeterminate adrenal lesions, which were not increased in size.    Indeterminate 11-12 mm left upper pole renal cystic lesion.  Findings may represent a complex cyst.  If warranted, consider follow-up  renal ultrasound when clinically appropriate.    Questionably a mildly thickened urinary bladder wall.  Findings may be due to infection or lower under distension.    Colonic diverticulosis.    This report was flagged in Epic as abnormal.    The preliminary and final reports are near concordant.      Electronically signed by: Anna Proctor  Date:    04/30/2023  Time:    19:04       I have personally reviewed the above imaging.     Path:   Reviewed pathology as documented above.    Genomic testing:     Germline genetic testing  No results found for this or any previous visit.     Somatic tumor genotyping:      ctDNA genotyping:  Miscellaneous Genomic Results       Tumor NGS Blood  Resulted: 5/21/23 Status: Final result       Detected - Pathogenic (2)      Gene Variant Allelic State & Phase   TP53 p.R282W - c.844C>T Missense variant - LOF --   TP53 p.L194R - c.581T>G Missense variant - LOF --                             Diagnoses:     1. Prostate cancer metastatic to intrapelvic lymph node    2. Androgen deprivation therapy    3. Hypertension, unspecified type    4. Mixed hyperlipidemia    5. Chest pain, unspecified type    6. Type 2 diabetes mellitus with diabetic polyneuropathy, without long-term current use of insulin    7. Vitamin D deficiency                Assessment and Plan:     1. Prostate cancer metastatic to intrapelvic lymph node  Overview:  Intermediate risk Jagdeep 4+3 disease sp R1 prostatectomy 2015 with adjuvant RT presumably to prostate bed. Now with PSA recurrence and PSMA imaging with regional maren metastases and limited intraabdominal maren mets.Started intensified hormonal therapy with ADT + abiraterone 02/2023. Completed salvage RT with Dr. Mata      Assessment & Plan:  - Con't /5  - Lupron tomorrow with Dr. Edmond.  - Safety labs 6 weeks; FU in 12 weeks  - Anticipating completing therapy 10/2024  - Has been referred multiple times to cancer genetics       Orders:  -      Ambulatory referral/consult to Cardiology; Future; Expected date: 03/26/2024    2. Androgen deprivation therapy  Overview:  Current agent: Triptorelin 11.25 (through Dr. Edmond)  Start date: 10/2022  Last dose: 12/27/23  Next dose: 03/20/24  Duration of therapy: 2 years    Novel Androgen signaling inhibitors:   Agent: Abiraterone + prednisone 250/5  Start date: 02/2023          3. Hypertension, unspecified type  Overview:  Home medications include carvedilol, amlodipine, and losartan.       4. Mixed hyperlipidemia  Overview:  Home medications include atorvastatin and aspirin for primary prophylaxis.    Assessment & Plan:  - Con't on atorvastatin and ASA      5. Chest pain, unspecified type  Assessment & Plan:  - Will try to expedite cardiology referral  - EKG 01/2024 normal    Orders:  -     Ambulatory referral/consult to Cardiology; Future; Expected date: 03/26/2024    6. Type 2 diabetes mellitus with diabetic polyneuropathy, without long-term current use of insulin  Overview:  Complicated by peripheral neuropathy and potentially gastroparesis. Currently on metformin and lantus.      7. Vitamin D deficiency            Follow up:   Route Chart for Scheduling    Med Onc Chart Routing      Follow up with physician 3 months.   Follow up with DONAVAN    Infusion scheduling note    Injection scheduling note    Labs CBC, CMP and PSA   Scheduling:  Preferred lab:  Lab interval:  CMP 6 weeks; full labs  3 months.   Imaging    Pharmacy appointment    Other referrals                   Treatment Plan Information   OP ABIRATERONE DAILY PREDNISONE BID   Edward Zuleta MD   Upcoming Treatment Dates - OP ABIRATERONE DAILY PREDNISONE BID    No upcoming days in selected categories.    Therapy Plan Information  Flushes  sodium chloride 0.9% flush 10 mL  10 mL, Intravenous, PRN  heparin, porcine (PF) 100 unit/mL injection flush 500 Units  500 Units, Intravenous, PRN         The above information has been reviewed with the patient and  all questions have been answered to their apparent satisfaction.  They understand that they can call the clinic with any questions.    Edward Zuleta

## 2024-03-19 NOTE — Clinical Note
Mert Lockett - Mr. Benitez is being treated for prostate cancer - has ongoing atypical chest pains. Also with awfully high ASCVD risk score. Hoping to get him in with cardiology. Thanks!

## 2024-03-19 NOTE — TELEPHONE ENCOUNTER
Spoke with patients wife, She said 11:30 would be better for pt. states she will notify him of appointment changes.

## 2024-03-20 ENCOUNTER — OFFICE VISIT (OUTPATIENT)
Dept: UROLOGY | Facility: CLINIC | Age: 59
End: 2024-03-20
Payer: MEDICAID

## 2024-03-20 VITALS — WEIGHT: 200.63 LBS | HEIGHT: 71 IN | BODY MASS INDEX: 28.09 KG/M2

## 2024-03-20 DIAGNOSIS — I10 HYPERTENSION, UNSPECIFIED TYPE: ICD-10-CM

## 2024-03-20 DIAGNOSIS — C61 PROSTATE CANCER METASTATIC TO INTRAPELVIC LYMPH NODE: ICD-10-CM

## 2024-03-20 DIAGNOSIS — Z92.3 STATUS POST RADIATION THERAPY: ICD-10-CM

## 2024-03-20 DIAGNOSIS — Z79.818 ANDROGEN DEPRIVATION THERAPY: Primary | ICD-10-CM

## 2024-03-20 DIAGNOSIS — C77.5 PROSTATE CANCER METASTATIC TO INTRAPELVIC LYMPH NODE: ICD-10-CM

## 2024-03-20 PROCEDURE — 4010F ACE/ARB THERAPY RXD/TAKEN: CPT | Mod: CPTII,,, | Performed by: UROLOGY

## 2024-03-20 PROCEDURE — 99999PBSHW PR PBB SHADOW TECHNICAL ONLY FILED TO HB: Mod: JZ,PBBFAC,,

## 2024-03-20 PROCEDURE — 3066F NEPHROPATHY DOC TX: CPT | Mod: CPTII,,, | Performed by: UROLOGY

## 2024-03-20 PROCEDURE — 99999 PR PBB SHADOW E&M-EST. PATIENT-LVL V: CPT | Mod: PBBFAC,,, | Performed by: UROLOGY

## 2024-03-20 PROCEDURE — 96402 CHEMO HORMON ANTINEOPL SQ/IM: CPT | Mod: PBBFAC,PO

## 2024-03-20 PROCEDURE — 99215 OFFICE O/P EST HI 40 MIN: CPT | Mod: PBBFAC,PO,25 | Performed by: UROLOGY

## 2024-03-20 PROCEDURE — 1159F MED LIST DOCD IN RCRD: CPT | Mod: CPTII,,, | Performed by: UROLOGY

## 2024-03-20 PROCEDURE — 1160F RVW MEDS BY RX/DR IN RCRD: CPT | Mod: CPTII,,, | Performed by: UROLOGY

## 2024-03-20 PROCEDURE — 3008F BODY MASS INDEX DOCD: CPT | Mod: CPTII,,, | Performed by: UROLOGY

## 2024-03-20 PROCEDURE — 99215 OFFICE O/P EST HI 40 MIN: CPT | Mod: S$PBB,,, | Performed by: UROLOGY

## 2024-03-20 RX ORDER — CARVEDILOL 25 MG/1
25 TABLET ORAL 2 TIMES DAILY
Qty: 60 TABLET | Refills: 11 | Status: SHIPPED | OUTPATIENT
Start: 2024-03-20 | End: 2024-06-18 | Stop reason: SDUPTHER

## 2024-03-20 RX ADMIN — TRIPTORELIN PAMOATE 11.25 MG: KIT at 12:03

## 2024-03-20 NOTE — PROGRESS NOTES
Subjective:      Patient ID: Mauro Benitez is a 58 y.o. male.    Chief Complaint: prostate cancer - trelstar    Mr. Benitez is a 58-year-old gentleman with a history of prostate cancer.  The patient had his prostate removed in 2015 in [place].  There was extraprostatic extension and the patient had treatment with radiation to the prostatic bed.  Patient had been following up regularly and recently relocated to the area.  He was found have elevated PSA and PSMA scan revealed periaortic nodes involvement.  The patient underwent treatment with hormonal blockade and radiation therapy to this area.  The patient finished his radiation at the end of October and is here for follow-up.  He is due for Trelstar injection today.   The patient is to receive 1 more Trelstar injection in 3 months.  This will carry mount to completion of the 2 years of ADT.  After that medication is delivered the patient will follow-up every 3 months for 2 years with a PSA test and then every 6 months thereafter.  Will modify plans for follow-up based on PSA findings.    Follow-up  This is a chronic (Metastatic prostate cancer to intrapelvic lymph node) problem. The current episode started more than 1 year ago. The problem occurs constantly. The problem has been resolved. Pertinent negatives include no abdominal pain, anorexia, arthralgias, change in bowel habit, chest pain, chills, congestion, coughing, diaphoresis, fatigue, fever, headaches, joint swelling, myalgias, nausea, neck pain, numbness, rash, sore throat, swollen glands, urinary symptoms, vertigo, visual change, vomiting or weakness. Nothing aggravates the symptoms. Treatments tried: XRT and ADT. The treatment provided significant relief.     Review of Systems   Constitutional:  Negative for activity change, appetite change, chills, diaphoresis, fatigue, fever and unexpected weight change.   HENT:  Negative for congestion, hearing loss, sinus pressure, sore throat and trouble  swallowing.    Eyes:  Negative for photophobia, pain, discharge and visual disturbance.   Respiratory:  Negative for apnea, cough and shortness of breath.    Cardiovascular:  Negative for chest pain, palpitations and leg swelling.   Gastrointestinal:  Negative for abdominal distention, abdominal pain, anal bleeding, anorexia, blood in stool, change in bowel habit, constipation, diarrhea, nausea, rectal pain and vomiting.   Endocrine: Negative for cold intolerance, heat intolerance, polydipsia, polyphagia and polyuria.   Genitourinary:  Negative for decreased urine volume, difficulty urinating, dysuria, enuresis, flank pain, frequency, genital sores, hematuria, penile discharge, penile pain, penile swelling, scrotal swelling, testicular pain and urgency.   Musculoskeletal:  Negative for arthralgias, back pain, joint swelling, myalgias and neck pain.   Skin:  Negative for color change, pallor, rash and wound.   Allergic/Immunologic: Negative for environmental allergies, food allergies and immunocompromised state.   Neurological:  Negative for dizziness, vertigo, seizures, weakness, numbness and headaches.   Hematological:  Negative for adenopathy. Does not bruise/bleed easily.   Psychiatric/Behavioral: Negative.        Objective:     Physical Exam  Vitals and nursing note reviewed.   Constitutional:       Appearance: He is well-developed.   HENT:      Head: Normocephalic.      Nose: Nose normal.   Eyes:      Conjunctiva/sclera: Conjunctivae normal.      Pupils: Pupils are equal, round, and reactive to light.   Cardiovascular:      Rate and Rhythm: Normal rate and regular rhythm.      Heart sounds: Normal heart sounds.   Pulmonary:      Effort: Pulmonary effort is normal.   Abdominal:      General: Bowel sounds are normal. There is no distension.      Palpations: Abdomen is soft. There is no mass.      Tenderness: There is no abdominal tenderness. There is no guarding or rebound.      Hernia: No hernia is present.    Genitourinary:     Comments: Patient with no CVA tenderness, normal penis and scrotum.  Bladder nontender.  Musculoskeletal:         General: Normal range of motion.      Cervical back: Normal range of motion and neck supple.   Skin:     General: Skin is warm and dry.      Capillary Refill: Capillary refill takes less than 2 seconds.   Neurological:      General: No focal deficit present.      Mental Status: He is alert and oriented to person, place, and time.      Deep Tendon Reflexes: Reflexes are normal and symmetric.   Psychiatric:         Mood and Affect: Mood normal.         Behavior: Behavior normal.         Thought Content: Thought content normal.         Judgment: Judgment normal.        Assessment:      1. Androgen deprivation therapy    2. Prostate cancer metastatic to intrapelvic lymph node    3. Status post radiation therapy      Plan:     There are no Patient Instructions on file for this visit.

## 2024-03-20 NOTE — PATIENT INSTRUCTIONS
Trelstar 11.25 mg given injection today in clinic.  Follow-up in 3 months for repeat injection.  Repeat PSA test in 3 months  Will follow-up quarterly starting in September of 2024 through September of 2026 and then follow up every 6 months thereafter with PSA checks.

## 2024-04-08 ENCOUNTER — PATIENT MESSAGE (OUTPATIENT)
Dept: CARDIOLOGY | Facility: CLINIC | Age: 59
End: 2024-04-08
Payer: MEDICAID

## 2024-04-18 PROBLEM — I70.0 AORTIC ATHEROSCLEROSIS: Status: ACTIVE | Noted: 2024-04-18

## 2024-04-18 NOTE — PROGRESS NOTES
Subjective:   Patient ID:  Mauro Benitez is a 58 y.o. male who presents for evaluation of Chest Pain (Chest pain occurring past 1 month.)    PROBLEM LIST:  Metastatic prostate cancer (Abiraterone, lupron)  HTN  HLD  DM  CKD3  VENKATESH  Tobacco use    HPI: Cardio-oncology consultation  Referred by Dr Zuleta to establish care with Cardio-Oncology. Mauro Benitez is a 58 y.o. male with history of prostate cancer sp RALP with positive margins 11/2/2015 with adjuvant RT to the prostate bed. He was subsequently found to have BCR with PSA 2.8  and maren relapse on PSMA imaging 06/2022. He started ADT 10/2022 with addition of /5 started 02/2023 with salvage RT to pelvic nodes 09/2023 - 10/2023.     He is accompanied today by his wife. He reports a few month history of exertional tightness in his chest associated with fatigue. 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 They have not progressed but he does report an episode of sharp, severe chest pain on Tuesday at work that lasted for about 10 minutes. There was no radiation or associated symptoms. The pain has not recurred. He has treated HTN, HLD and DM. He has not had any prior cardiac evaluations. He smokes marijuana and cigars daily.    ECG 07-JAN-2024 17:47:00: Personally reviewed by me shows NSR 65 bpm QTc 436 ms    Past Medical History:   Diagnosis Date    Aortic atherosclerosis 04/18/2024    Diabetes mellitus     GERD (gastroesophageal reflux disease)     Hypertension     Prostate cancer     Sleep apnea        Past Surgical History:   Procedure Laterality Date    ESOPHAGOGASTRODUODENOSCOPY N/A 12/5/2022    Procedure: EGD (ESOPHAGOGASTRODUODENOSCOPY);  Surgeon: Beronica Valera MD;  Location: [facility];  Service: Endoscopy;  Laterality: N/A;    PROSTATECTOMY         Social History     Socioeconomic History    Marital status: Single   Tobacco Use    Smoking status: Every Day     Types: Cigars    Smokeless tobacco: Never    Tobacco comments:     " 2-3 cigars/day   Substance and Sexual Activity    Alcohol use: Yes     Comment: Occaisionally throughout the week    Drug use: Yes     Types: Marijuana     Comment: Remote crack cocaine use. No IVDU. Daily marijuana use.    Sexual activity: Yes     Partners: Female   Social History Narrative    Lives on the [facility] accompanied by partner Gretel. Has adult sone and daughter in [place] and [place]. Works as a cook.      Social Determinants of Health     Food Insecurity: No Food Insecurity (4/8/2021)    Received from [facility]    Hunger Vital Sign     Worried About Running Out of Food in the Last Year: Never true     Ran Out of Food in the Last Year: Never true       Family History   Problem Relation Name Age of Onset    Hypertension Mother      Dementia Mother      Heart disease Mother      Cancer Father age 55     Prostate cancer Father age 55     Hypertension Brother      Pancreatic cancer Neg Hx      Breast cancer Neg Hx         Patient's Medications   New Prescriptions    No medications on file   Previous Medications    ABIRATERONE (ZYTIGA) 250 MG TAB    Take 1 tablet (250 mg total) by mouth once daily Take as directed with a low fat meal.    ACETAMINOPHEN (TYLENOL) 500 MG TABLET    Take 1,500 mg by mouth daily as needed for Pain.    AMITRIPTYLINE (ELAVIL) 25 MG TABLET    Take 25 mg by mouth every evening.    AMLODIPINE (NORVASC) 10 MG TABLET    Take 10 mg by mouth every evening.    ASPIRIN (ECOTRIN) 81 MG EC TABLET    Aspir-81 mg tablet,delayed release   Take 1 tablet every day by oral route in the morning for 30 days.    ATORVASTATIN (LIPITOR) 40 MG TABLET    atorvastatin 40 mg tablet   TAKE 1 TABLET BY MOUTH EVERY DAY AT BEDTIME    BD ULTRA-FINE XIAO PEN NEEDLE 32 GAUGE X 5/32" NDLE    USE AS DIRECTED DAILY.    BLOOD SUGAR DIAGNOSTIC (ONETOUCH VERIO TEST STRIPS MISC)    OneTouch Verio test strips    BLOOD SUGAR DIAGNOSTIC (ONETOUCH VERIO TEST STRIPS MISC)    OneTouch Verio test strips   USE 1 TEST " STRIP TO CHECK BLOOD SUGAR TWICE DAILY    BLOOD SUGAR DIAGNOSTIC STRP    Accu-Chek Odalis Plus test strips   Take 1 strip twice a day by miscell. route.    CARVEDILOL (COREG) 25 MG TABLET    TAKE 1 TABLET(25 MG) BY MOUTH TWICE DAILY    CHOLECALCIFEROL, VITAMIN D3, (VITAMIN D3) 25 MCG (1,000 UNIT) CAPSULE    Take 1 capsule (1,000 Units total) by mouth once daily.    FLUTICASONE PROPIONATE (FLONASE) 50 MCG/ACTUATION NASAL SPRAY    fluticasone propionate 50 mcg/actuation nasal spray,suspension   SPRAY 2 SPRAYS EVERY DAY BY INTRANASAL ROUTE AS NEEDED.    INSULIN GLARGINE 100 UNITS/ML SUBQ PEN    Lantus Solostar U-100 Insulin 100 unit/mL (3 mL) subcutaneous pen   Inject 10 units every day by subcutaneous route in the evening.    LOSARTAN (COZAAR) 100 MG TABLET    Take 100 mg by mouth once daily.    METFORMIN (GLUCOPHAGE) 1000 MG TABLET    metformin 1,000 mg tablet   TAKE 1 TABLET BY MOUTH TWICE DAILY    MIRABEGRON (MYRBETRIQ) 25 MG TB24 ER TABLET    Take 1 tablet (25 mg total) by mouth once daily.    OMEPRAZOLE (PRILOSEC) 20 MG CAPSULE    Take 20 mg by mouth.    ONDANSETRON (ZOFRAN-ODT) 4 MG TBDL    Take 2 tablets (8 mg total) by mouth every 8 (eight) hours as needed.    PREDNISONE (DELTASONE) 5 MG TABLET    Take 1 tablet (5 mg total) by mouth once daily. Take as directed with water on an empty stomach.    PREGABALIN (LYRICA) 50 MG CAPSULE    pregabalin 50 mg capsule   Take 1 capsule 3 times a day by oral route.   Taper off of gabapentin first    PROCHLORPERAZINE (COMPAZINE) 10 MG TABLET    Take 1 tablet (10 mg total) by mouth every 6 (six) hours as needed (nausea).    SILDENAFIL (VIAGRA) 100 MG TABLET    Take 100 mg by mouth as needed.    VENLAFAXINE (EFFEXOR-XR) 37.5 MG 24 HR CAPSULE    Take 37.5 mg by mouth.   Modified Medications    No medications on file   Discontinued Medications    No medications on file       Review of Systems   Constitutional: Positive for malaise/fatigue. Negative for weight gain.   HENT:   Negative for hearing loss.    Eyes:  Negative for visual disturbance.   Cardiovascular:  Positive for chest pain. Negative for claudication, dyspnea on exertion, leg swelling, near-syncope, orthopnea, palpitations, paroxysmal nocturnal dyspnea and syncope.   Respiratory:  Negative for cough, shortness of breath, sleep disturbances due to breathing, snoring and wheezing.    Endocrine: Positive for heat intolerance. Negative for cold intolerance, polydipsia, polyphagia and polyuria.   Hematologic/Lymphatic: Negative for bleeding problem. Does not bruise/bleed easily.   Skin:  Negative for rash and suspicious lesions.   Musculoskeletal:  Negative for arthritis, falls, joint pain, muscle weakness and myalgias.   Gastrointestinal:  Negative for abdominal pain, change in bowel habit, constipation, diarrhea, heartburn, hematochezia, melena and nausea.   Genitourinary:  Negative for hematuria and nocturia.   Neurological:  Negative for excessive daytime sleepiness, dizziness, headaches, light-headedness, loss of balance and weakness.   Psychiatric/Behavioral:  Negative for depression. The patient is not nervous/anxious.    Allergic/Immunologic: Negative for environmental allergies.       Wt 91.2 kg (201 lb 1 oz)   BMI 28.04 kg/m²     Objective:   Physical Exam  Constitutional:       Appearance: He is well-developed.      Comments:      HENT:      Head: Normocephalic and atraumatic.   Eyes:      General: No scleral icterus.     Conjunctiva/sclera: Conjunctivae normal.      Pupils: Pupils are equal, round, and reactive to light.   Neck:      Thyroid: No thyromegaly.      Vascular: No hepatojugular reflux or JVD.      Trachea: No tracheal deviation.   Cardiovascular:      Rate and Rhythm: Normal rate and regular rhythm.      Chest Wall: PMI is not displaced.      Pulses: Intact distal pulses.           Carotid pulses are 2+ on the right side and 2+ on the left side.       Radial pulses are 2+ on the right side and 2+ on the  left side.        Dorsalis pedis pulses are 2+ on the right side and 2+ on the left side.        Posterior tibial pulses are 2+ on the right side and 2+ on the left side.      Heart sounds: Normal heart sounds.   Pulmonary:      Effort: Pulmonary effort is normal.      Breath sounds: Normal breath sounds.   Abdominal:      General: Bowel sounds are normal. There is no distension.      Palpations: Abdomen is soft. There is no mass.      Tenderness: There is no abdominal tenderness.   Musculoskeletal:         General: No tenderness.      Cervical back: Normal range of motion and neck supple.   Lymphadenopathy:      Cervical: No cervical adenopathy.   Skin:     General: Skin is warm and dry.      Findings: No erythema or rash.      Nails: There is no clubbing.   Neurological:      Mental Status: He is alert and oriented to person, place, and time.   Psychiatric:         Speech: Speech normal.         Behavior: Behavior normal.         Lab Results   Component Value Date     03/19/2024    K 5.2 (H) 03/19/2024     03/19/2024    CO2 23 03/19/2024    BUN 15 03/19/2024    CREATININE 1.0 03/19/2024     (H) 03/19/2024    HGBA1C 7.0 (H) 05/12/2023    MG 1.6 04/01/2024    AST 13 03/19/2024    ALT 12 03/19/2024    ALBUMIN 3.6 03/19/2024    PROT 7.1 03/19/2024    BILITOT 0.5 03/19/2024    WBC 7.95 03/19/2024    HGB 9.7 (L) 03/19/2024    HCT 31.7 (L) 03/19/2024     (H) 03/19/2024     03/19/2024    INR 1.0 09/26/2021    TSH 1.500 10/25/2020    CHOL 158 07/21/2023    HDL 51 07/21/2023    LDLCALC 90.8 07/21/2023    TRIG 81 07/21/2023       Assessment:     1. Primary hypertension : Blood pressure is at goal. I have made no changes. Continue current regimen.   2. Mixed hyperlipidemia : Lipids are at goal. Continue statin therapy. Following a heart health diet such as the Mediterranean Diet is recommended in addition to 150 minutes a week of moderate intensity exercise to lower cholesterol, maintain a  "healthy body weight, and improve overall cardiovascular health.   3. Chronic kidney disease (CKD) stage G3a/A1, moderately decreased glomerular filtration rate (GFR) between 45-59 mL/min/1.73 square meter and albuminuria creatinine ratio less than 30 mg/g    4. Prostate cancer metastatic to intrapelvic lymph node : s/p prostatectomy, XRT and now on lupron and zytiga.   5. Type 2 diabetes mellitus with diabetic polyneuropathy, without long-term current use of insulin : Managed by his PCP.  On insulin and metformin.   6. 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   7. Tobacco abuse : Cigars.    8. Aortic atherosclerosis : Continue atorvastatin.   9. Chest pain, unspecified type : PET stress and echo ordered. ER precautions given.       Plan:     Mauro Thibodeaux" was seen today for chest pain.    Diagnoses and all orders for this visit:    Primary hypertension    Mixed hyperlipidemia  -     Echo; Future  -     Cardiac PET Scan Stress; Future    Chronic kidney disease (CKD) stage G3a/A1, moderately decreased glomerular filtration rate (GFR) between 45-59 mL/min/1.73 square meter and albuminuria creatinine ratio less than 30 mg/g    Prostate cancer metastatic to intrapelvic lymph node  -     Ambulatory referral/consult to Cardiology  -     Echo; Future  -     Cardiac PET Scan Stress; Future    Type 2 diabetes mellitus with diabetic polyneuropathy, without long-term current use of insulin  -     Echo; Future  -     Cardiac PET Scan Stress; Future    Obstructive sleep apnea syndrome    Tobacco abuse    Aortic atherosclerosis    Chest pain, unspecified type  -     Ambulatory referral/consult to Cardiology  -     Echo; Future  -     Cardiac PET Scan Stress; Future        Thank you for allowing me to participate in this patient's care. Please do not hesitate to contact me with any questions or concerns.          "

## 2024-04-25 ENCOUNTER — OFFICE VISIT (OUTPATIENT)
Dept: CARDIOLOGY | Facility: CLINIC | Age: 59
End: 2024-04-25
Payer: MEDICAID

## 2024-04-25 VITALS
HEART RATE: 85 BPM | BODY MASS INDEX: 28.04 KG/M2 | OXYGEN SATURATION: 96 % | DIASTOLIC BLOOD PRESSURE: 64 MMHG | SYSTOLIC BLOOD PRESSURE: 119 MMHG | WEIGHT: 201.06 LBS

## 2024-04-25 DIAGNOSIS — C77.5 PROSTATE CANCER METASTATIC TO INTRAPELVIC LYMPH NODE: ICD-10-CM

## 2024-04-25 DIAGNOSIS — E11.42 TYPE 2 DIABETES MELLITUS WITH DIABETIC POLYNEUROPATHY, WITHOUT LONG-TERM CURRENT USE OF INSULIN: ICD-10-CM

## 2024-04-25 DIAGNOSIS — Z72.0 TOBACCO ABUSE: ICD-10-CM

## 2024-04-25 DIAGNOSIS — G47.33 OBSTRUCTIVE SLEEP APNEA SYNDROME: ICD-10-CM

## 2024-04-25 DIAGNOSIS — R07.9 CHEST PAIN, UNSPECIFIED TYPE: ICD-10-CM

## 2024-04-25 DIAGNOSIS — N18.31 CHRONIC KIDNEY DISEASE (CKD) STAGE G3A/A1, MODERATELY DECREASED GLOMERULAR FILTRATION RATE (GFR) BETWEEN 45-59 ML/MIN/1.73 SQUARE METER AND ALBUMINURIA CREATININE RATIO LESS THAN 30 MG/G: ICD-10-CM

## 2024-04-25 DIAGNOSIS — C61 PROSTATE CANCER METASTATIC TO INTRAPELVIC LYMPH NODE: ICD-10-CM

## 2024-04-25 DIAGNOSIS — I10 PRIMARY HYPERTENSION: Primary | ICD-10-CM

## 2024-04-25 DIAGNOSIS — E78.2 MIXED HYPERLIPIDEMIA: ICD-10-CM

## 2024-04-25 DIAGNOSIS — I70.0 AORTIC ATHEROSCLEROSIS: ICD-10-CM

## 2024-04-25 PROCEDURE — 99215 OFFICE O/P EST HI 40 MIN: CPT | Mod: PBBFAC | Performed by: INTERNAL MEDICINE

## 2024-04-25 PROCEDURE — 3008F BODY MASS INDEX DOCD: CPT | Mod: CPTII,,, | Performed by: INTERNAL MEDICINE

## 2024-04-25 PROCEDURE — 1160F RVW MEDS BY RX/DR IN RCRD: CPT | Mod: CPTII,,, | Performed by: INTERNAL MEDICINE

## 2024-04-25 PROCEDURE — 4010F ACE/ARB THERAPY RXD/TAKEN: CPT | Mod: CPTII,,, | Performed by: INTERNAL MEDICINE

## 2024-04-25 PROCEDURE — 3078F DIAST BP <80 MM HG: CPT | Mod: CPTII,,, | Performed by: INTERNAL MEDICINE

## 2024-04-25 PROCEDURE — 99999 PR PBB SHADOW E&M-EST. PATIENT-LVL V: CPT | Mod: PBBFAC,,, | Performed by: INTERNAL MEDICINE

## 2024-04-25 PROCEDURE — 1159F MED LIST DOCD IN RCRD: CPT | Mod: CPTII,,, | Performed by: INTERNAL MEDICINE

## 2024-04-25 PROCEDURE — 99204 OFFICE O/P NEW MOD 45 MIN: CPT | Mod: S$PBB,,, | Performed by: INTERNAL MEDICINE

## 2024-04-25 PROCEDURE — 3066F NEPHROPATHY DOC TX: CPT | Mod: CPTII,,, | Performed by: INTERNAL MEDICINE

## 2024-04-25 PROCEDURE — 3074F SYST BP LT 130 MM HG: CPT | Mod: CPTII,,, | Performed by: INTERNAL MEDICINE

## 2024-04-30 ENCOUNTER — LAB VISIT (OUTPATIENT)
Dept: LAB | Facility: HOSPITAL | Age: 59
End: 2024-04-30
Attending: HOSPITALIST
Payer: MEDICAID

## 2024-04-30 DIAGNOSIS — C61 PROSTATE CANCER: ICD-10-CM

## 2024-04-30 LAB
ALBUMIN SERPL BCP-MCNC: 3.7 G/DL (ref 3.5–5.2)
ALP SERPL-CCNC: 86 U/L (ref 55–135)
ALT SERPL W/O P-5'-P-CCNC: 13 U/L (ref 10–44)
ANION GAP SERPL CALC-SCNC: 12 MMOL/L (ref 8–16)
AST SERPL-CCNC: 11 U/L (ref 10–40)
BILIRUB SERPL-MCNC: 0.7 MG/DL (ref 0.1–1)
BUN SERPL-MCNC: 19 MG/DL (ref 6–20)
CALCIUM SERPL-MCNC: 9.8 MG/DL (ref 8.7–10.5)
CHLORIDE SERPL-SCNC: 97 MMOL/L (ref 95–110)
CO2 SERPL-SCNC: 22 MMOL/L (ref 23–29)
COMPLEXED PSA SERPL-MCNC: <0.01 NG/ML (ref 0–4)
CREAT SERPL-MCNC: 1.3 MG/DL (ref 0.5–1.4)
ERYTHROCYTE [DISTWIDTH] IN BLOOD BY AUTOMATED COUNT: 13 % (ref 11.5–14.5)
EST. GFR  (NO RACE VARIABLE): >60 ML/MIN/1.73 M^2
GLUCOSE SERPL-MCNC: 204 MG/DL (ref 70–110)
HCT VFR BLD AUTO: 39 % (ref 40–54)
HGB BLD-MCNC: 13 G/DL (ref 14–18)
IMM GRANULOCYTES # BLD AUTO: 0.02 K/UL (ref 0–0.04)
MCH RBC QN AUTO: 30.9 PG (ref 27–31)
MCHC RBC AUTO-ENTMCNC: 33.3 G/DL (ref 32–36)
MCV RBC AUTO: 93 FL (ref 82–98)
NEUTROPHILS # BLD AUTO: 4 K/UL (ref 1.8–7.7)
PLATELET # BLD AUTO: 340 K/UL (ref 150–450)
PMV BLD AUTO: 9.8 FL (ref 9.2–12.9)
POTASSIUM SERPL-SCNC: 4.1 MMOL/L (ref 3.5–5.1)
PROT SERPL-MCNC: 7.7 G/DL (ref 6–8.4)
RBC # BLD AUTO: 4.21 M/UL (ref 4.6–6.2)
SODIUM SERPL-SCNC: 131 MMOL/L (ref 136–145)
WBC # BLD AUTO: 5.39 K/UL (ref 3.9–12.7)

## 2024-04-30 PROCEDURE — 85027 COMPLETE CBC AUTOMATED: CPT | Performed by: HOSPITALIST

## 2024-04-30 PROCEDURE — 84153 ASSAY OF PSA TOTAL: CPT | Performed by: HOSPITALIST

## 2024-04-30 PROCEDURE — 36415 COLL VENOUS BLD VENIPUNCTURE: CPT | Performed by: HOSPITALIST

## 2024-04-30 PROCEDURE — 80053 COMPREHEN METABOLIC PANEL: CPT | Performed by: HOSPITALIST

## 2024-05-06 ENCOUNTER — HOSPITAL ENCOUNTER (OUTPATIENT)
Dept: CARDIOLOGY | Facility: HOSPITAL | Age: 59
Discharge: HOME | End: 2024-05-06
Attending: INTERNAL MEDICINE
Payer: MEDICAID

## 2024-05-06 VITALS
WEIGHT: 192 LBS | SYSTOLIC BLOOD PRESSURE: 120 MMHG | HEART RATE: 60 BPM | BODY MASS INDEX: 26.88 KG/M2 | HEIGHT: 71 IN | DIASTOLIC BLOOD PRESSURE: 70 MMHG

## 2024-05-06 DIAGNOSIS — E78.2 MIXED HYPERLIPIDEMIA: ICD-10-CM

## 2024-05-06 DIAGNOSIS — C61 PROSTATE CANCER METASTATIC TO INTRAPELVIC LYMPH NODE: ICD-10-CM

## 2024-05-06 DIAGNOSIS — E11.42 TYPE 2 DIABETES MELLITUS WITH DIABETIC POLYNEUROPATHY, WITHOUT LONG-TERM CURRENT USE OF INSULIN: ICD-10-CM

## 2024-05-06 DIAGNOSIS — C77.5 PROSTATE CANCER METASTATIC TO INTRAPELVIC LYMPH NODE: ICD-10-CM

## 2024-05-06 DIAGNOSIS — R07.9 CHEST PAIN, UNSPECIFIED TYPE: ICD-10-CM

## 2024-05-06 LAB
ASCENDING AORTA: 3.29 CM
AV INDEX (PROSTH): 1.15
AV MEAN GRADIENT: 2 MMHG
AV PEAK GRADIENT: 4 MMHG
AV VALVE AREA BY VELOCITY RATIO: 2.51 CM²
AV VALVE AREA: 3.99 CM²
AV VELOCITY RATIO: 0.73
BSA FOR ECHO PROCEDURE: 2.09 M2
CV ECHO LV RWT: 0.46 CM
DOP CALC AO PEAK VEL: 1.06 M/S
DOP CALC AO VTI: 14.83 CM
DOP CALC LVOT AREA: 3.5 CM2
DOP CALC LVOT DIAMETER: 2.1 CM
DOP CALC LVOT PEAK VEL: 0.77 M/S
DOP CALC LVOT STROKE VOLUME: 59.2 CM3
DOP CALCLVOT PEAK VEL VTI: 17.1 CM
E WAVE DECELERATION TIME: 272.02 MSEC
E/A RATIO: 0.74
E/E' RATIO: 4.88 M/S
ECHO LV POSTERIOR WALL: 1.04 CM (ref 0.6–1.1)
FRACTIONAL SHORTENING: 25 % (ref 28–44)
INTERVENTRICULAR SEPTUM: 0.97 CM (ref 0.6–1.1)
LA MAJOR: 4.2 CM
LA MINOR: 3.71 CM
LA WIDTH: 3.48 CM
LEFT ATRIUM SIZE: 2.48 CM
LEFT ATRIUM VOLUME INDEX MOD: 15.6 ML/M2
LEFT ATRIUM VOLUME INDEX: 14 ML/M2
LEFT ATRIUM VOLUME MOD: 32.38 CM3
LEFT ATRIUM VOLUME: 28.9 CM3
LEFT INTERNAL DIMENSION IN SYSTOLE: 3.41 CM (ref 2.1–4)
LEFT VENTRICLE DIASTOLIC VOLUME INDEX: 45.78 ML/M2
LEFT VENTRICLE DIASTOLIC VOLUME: 94.77 ML
LEFT VENTRICLE MASS INDEX: 76 G/M2
LEFT VENTRICLE SYSTOLIC VOLUME INDEX: 23 ML/M2
LEFT VENTRICLE SYSTOLIC VOLUME: 47.62 ML
LEFT VENTRICULAR INTERNAL DIMENSION IN DIASTOLE: 4.55 CM (ref 3.5–6)
LEFT VENTRICULAR MASS: 157.1 G
LV LATERAL E/E' RATIO: 3.55 M/S
LV SEPTAL E/E' RATIO: 7.8 M/S
MV PEAK A VEL: 0.53 M/S
MV PEAK E VEL: 0.39 M/S
MV STENOSIS PRESSURE HALF TIME: 78.89 MS
MV VALVE AREA P 1/2 METHOD: 2.79 CM2
OHS CV RV/LV RATIO: 0.75 CM
PISA TR MAX VEL: 1.92 M/S
RA MAJOR: 4.57 CM
RA PRESSURE ESTIMATED: 3 MMHG
RA WIDTH: 4.07 CM
RIGHT VENTRICULAR END-DIASTOLIC DIMENSION: 3.39 CM
RV TB RVSP: 5 MMHG
SINUS: 3.43 CM
STJ: 3.09 CM
TDI LATERAL: 0.11 M/S
TDI SEPTAL: 0.05 M/S
TDI: 0.08 M/S
TR MAX PG: 15 MMHG
TRICUSPID ANNULAR PLANE SYSTOLIC EXCURSION: 1.82 CM
TV REST PULMONARY ARTERY PRESSURE: 18 MMHG
Z-SCORE OF LEFT VENTRICULAR DIMENSION IN END DIASTOLE: -3.24
Z-SCORE OF LEFT VENTRICULAR DIMENSION IN END SYSTOLE: -0.96

## 2024-05-06 PROCEDURE — 93306 TTE W/DOPPLER COMPLETE: CPT

## 2024-05-06 PROCEDURE — 93306 TTE W/DOPPLER COMPLETE: CPT | Mod: 26,,, | Performed by: INTERNAL MEDICINE

## 2024-05-16 ENCOUNTER — TELEPHONE (OUTPATIENT)
Dept: CARDIOLOGY | Facility: HOSPITAL | Age: 59
End: 2024-05-16
Payer: MEDICAID

## 2024-05-16 DIAGNOSIS — R07.9 CHEST PAIN, UNSPECIFIED TYPE: Primary | ICD-10-CM

## 2024-05-30 ENCOUNTER — TELEPHONE (OUTPATIENT)
Dept: CARDIOLOGY | Facility: HOSPITAL | Age: 59
End: 2024-05-30
Payer: MEDICAID

## 2024-06-03 ENCOUNTER — HOSPITAL ENCOUNTER (OUTPATIENT)
Dept: CARDIOLOGY | Facility: HOSPITAL | Age: 59
Discharge: HOME | End: 2024-06-03
Attending: INTERNAL MEDICINE
Payer: MEDICAID

## 2024-06-03 VITALS
SYSTOLIC BLOOD PRESSURE: 146 MMHG | DIASTOLIC BLOOD PRESSURE: 86 MMHG | HEART RATE: 81 BPM | HEIGHT: 71 IN | BODY MASS INDEX: 26.88 KG/M2 | WEIGHT: 192 LBS

## 2024-06-03 DIAGNOSIS — R07.9 CHEST PAIN, UNSPECIFIED TYPE: ICD-10-CM

## 2024-06-03 LAB
CV PHARM DOSE: 0.4 MG
CV STRESS BASE HR: 81 BPM
DIASTOLIC BLOOD PRESSURE: 86 MMHG
EJECTION FRACTION- HIGH: 59 %
END DIASTOLIC INDEX-HIGH: 155 ML/M2
END DIASTOLIC INDEX-LOW: 91 ML/M2
END SYSTOLIC INDEX-HIGH: 78 ML/M2
END SYSTOLIC INDEX-LOW: 40 ML/M2
NUC REST DIASTOLIC VOLUME INDEX: 85
NUC REST EJECTION FRACTION: 56
NUC REST SYSTOLIC VOLUME INDEX: 37
NUC STRESS DIASTOLIC VOLUME INDEX: 97
NUC STRESS EJECTION FRACTION: 56 %
NUC STRESS SYSTOLIC VOLUME INDEX: 43
OHS CV CPX 85 PERCENT MAX PREDICTED HEART RATE MALE: 137
OHS CV CPX MAX PREDICTED HEART RATE: 161
OHS CV CPX PATIENT IS FEMALE: 0
OHS CV CPX PATIENT IS MALE: 1
OHS CV CPX PEAK DIASTOLIC BLOOD PRESSURE: 86 MMHG
OHS CV CPX PEAK HEAR RATE: 90 BPM
OHS CV CPX PEAK RATE PRESSURE PRODUCT: NORMAL
OHS CV CPX PEAK SYSTOLIC BLOOD PRESSURE: 146 MMHG
OHS CV CPX PERCENT MAX PREDICTED HEART RATE ACHIEVED: 56
OHS CV CPX RATE PRESSURE PRODUCT PRESENTING: NORMAL
RETIRED EF AND QEF - SEE NOTES: 47 %
SYSTOLIC BLOOD PRESSURE: 146 MMHG

## 2024-06-03 PROCEDURE — 93016 CV STRESS TEST SUPVJ ONLY: CPT | Mod: ,,, | Performed by: INTERNAL MEDICINE

## 2024-06-03 PROCEDURE — 78452 HT MUSCLE IMAGE SPECT MULT: CPT | Mod: 26,,, | Performed by: INTERNAL MEDICINE

## 2024-06-03 PROCEDURE — 93017 CV STRESS TEST TRACING ONLY: CPT

## 2024-06-03 PROCEDURE — 63600175 PHARM REV CODE 636 W HCPCS: Performed by: INTERNAL MEDICINE

## 2024-06-03 PROCEDURE — A9502 TC99M TETROFOSMIN: HCPCS | Performed by: INTERNAL MEDICINE

## 2024-06-03 PROCEDURE — 93018 CV STRESS TEST I&R ONLY: CPT | Mod: ,,, | Performed by: INTERNAL MEDICINE

## 2024-06-03 RX ORDER — AMINOPHYLLINE 25 MG/ML
75 INJECTION, SOLUTION INTRAVENOUS ONCE
Status: COMPLETED | OUTPATIENT
Start: 2024-06-03 | End: 2024-06-03

## 2024-06-03 RX ORDER — REGADENOSON 0.08 MG/ML
0.4 INJECTION, SOLUTION INTRAVENOUS
Status: COMPLETED | OUTPATIENT
Start: 2024-06-03 | End: 2024-06-03

## 2024-06-03 RX ADMIN — AMINOPHYLLINE 75 MG: 25 INJECTION, SOLUTION INTRAVENOUS at 09:06

## 2024-06-03 RX ADMIN — REGADENOSON 0.4 MG: 0.08 INJECTION, SOLUTION INTRAVENOUS at 09:06

## 2024-06-03 RX ADMIN — TETROFOSMIN 31.2 MILLICURIE: 1.38 INJECTION, POWDER, LYOPHILIZED, FOR SOLUTION INTRAVENOUS at 09:06

## 2024-06-03 RX ADMIN — TETROFOSMIN 10.1 MILLICURIE: 1.38 INJECTION, POWDER, LYOPHILIZED, FOR SOLUTION INTRAVENOUS at 08:06

## 2024-06-18 ENCOUNTER — OFFICE VISIT (OUTPATIENT)
Dept: HEMATOLOGY/ONCOLOGY | Facility: CLINIC | Age: 59
End: 2024-06-18
Payer: MEDICAID

## 2024-06-18 ENCOUNTER — LAB VISIT (OUTPATIENT)
Dept: LAB | Facility: HOSPITAL | Age: 59
End: 2024-06-18
Attending: HOSPITALIST
Payer: MEDICAID

## 2024-06-18 VITALS
WEIGHT: 198.94 LBS | OXYGEN SATURATION: 98 % | HEART RATE: 126 BPM | BODY MASS INDEX: 27.75 KG/M2 | DIASTOLIC BLOOD PRESSURE: 87 MMHG | SYSTOLIC BLOOD PRESSURE: 142 MMHG

## 2024-06-18 DIAGNOSIS — E11.42 TYPE 2 DIABETES MELLITUS WITH DIABETIC POLYNEUROPATHY, WITHOUT LONG-TERM CURRENT USE OF INSULIN: ICD-10-CM

## 2024-06-18 DIAGNOSIS — I10 HYPERTENSION, UNSPECIFIED TYPE: ICD-10-CM

## 2024-06-18 DIAGNOSIS — E11.42 DIABETIC PERIPHERAL NEUROPATHY: ICD-10-CM

## 2024-06-18 DIAGNOSIS — M85.80 OSTEOPENIA, UNSPECIFIED LOCATION: ICD-10-CM

## 2024-06-18 DIAGNOSIS — C77.5 PROSTATE CANCER METASTATIC TO INTRAPELVIC LYMPH NODE: Primary | ICD-10-CM

## 2024-06-18 DIAGNOSIS — C61 PROSTATE CANCER METASTATIC TO INTRAPELVIC LYMPH NODE: ICD-10-CM

## 2024-06-18 DIAGNOSIS — Z79.818 ANDROGEN DEPRIVATION THERAPY: ICD-10-CM

## 2024-06-18 DIAGNOSIS — C61 PROSTATE CANCER: ICD-10-CM

## 2024-06-18 DIAGNOSIS — C77.5 PROSTATE CANCER METASTATIC TO INTRAPELVIC LYMPH NODE: ICD-10-CM

## 2024-06-18 DIAGNOSIS — C61 PROSTATE CANCER METASTATIC TO INTRAPELVIC LYMPH NODE: Primary | ICD-10-CM

## 2024-06-18 PROBLEM — Z92.3 STATUS POST RADIATION THERAPY: Status: RESOLVED | Noted: 2024-03-20 | Resolved: 2024-06-18

## 2024-06-18 LAB
ALBUMIN SERPL BCP-MCNC: 3.9 G/DL (ref 3.5–5.2)
ALP SERPL-CCNC: 100 U/L (ref 55–135)
ALT SERPL W/O P-5'-P-CCNC: 19 U/L (ref 10–44)
ANION GAP SERPL CALC-SCNC: 13 MMOL/L (ref 8–16)
AST SERPL-CCNC: 16 U/L (ref 10–40)
BILIRUB SERPL-MCNC: 0.6 MG/DL (ref 0.1–1)
BUN SERPL-MCNC: 19 MG/DL (ref 6–20)
CALCIUM SERPL-MCNC: 10.1 MG/DL (ref 8.7–10.5)
CHLORIDE SERPL-SCNC: 106 MMOL/L (ref 95–110)
CO2 SERPL-SCNC: 20 MMOL/L (ref 23–29)
COMPLEXED PSA SERPL-MCNC: <0.01 NG/ML (ref 0–4)
CREAT SERPL-MCNC: 1.8 MG/DL (ref 0.5–1.4)
ERYTHROCYTE [DISTWIDTH] IN BLOOD BY AUTOMATED COUNT: 15.1 % (ref 11.5–14.5)
EST. GFR  (NO RACE VARIABLE): 42.8 ML/MIN/1.73 M^2
GLUCOSE SERPL-MCNC: 213 MG/DL (ref 70–110)
HCT VFR BLD AUTO: 34.1 % (ref 40–54)
HGB BLD-MCNC: 10.1 G/DL (ref 14–18)
IMM GRANULOCYTES # BLD AUTO: 0.06 K/UL (ref 0–0.04)
MCH RBC QN AUTO: 29.6 PG (ref 27–31)
MCHC RBC AUTO-ENTMCNC: 29.6 G/DL (ref 32–36)
MCV RBC AUTO: 100 FL (ref 82–98)
NEUTROPHILS # BLD AUTO: 6.4 K/UL (ref 1.8–7.7)
PLATELET # BLD AUTO: 357 K/UL (ref 150–450)
PMV BLD AUTO: 10 FL (ref 9.2–12.9)
POTASSIUM SERPL-SCNC: 4.4 MMOL/L (ref 3.5–5.1)
PROSTATE SPECIFIC ANTIGEN, TOTAL: <0.01 NG/ML (ref 0–4)
PROT SERPL-MCNC: 7.5 G/DL (ref 6–8.4)
PSA FREE MFR SERPL: NORMAL %
PSA FREE SERPL-MCNC: <0.1 NG/ML (ref 0–1.5)
RBC # BLD AUTO: 3.41 M/UL (ref 4.6–6.2)
SODIUM SERPL-SCNC: 139 MMOL/L (ref 136–145)
TESTOST SERPL-MCNC: <4 NG/DL (ref 304–1227)
WBC # BLD AUTO: 7.65 K/UL (ref 3.9–12.7)

## 2024-06-18 PROCEDURE — 1159F MED LIST DOCD IN RCRD: CPT | Mod: CPTII,,, | Performed by: HOSPITALIST

## 2024-06-18 PROCEDURE — 85027 COMPLETE CBC AUTOMATED: CPT | Performed by: HOSPITALIST

## 2024-06-18 PROCEDURE — 3066F NEPHROPATHY DOC TX: CPT | Mod: CPTII,,, | Performed by: HOSPITALIST

## 2024-06-18 PROCEDURE — 3079F DIAST BP 80-89 MM HG: CPT | Mod: CPTII,,, | Performed by: HOSPITALIST

## 2024-06-18 PROCEDURE — 84403 ASSAY OF TOTAL TESTOSTERONE: CPT | Performed by: UROLOGY

## 2024-06-18 PROCEDURE — 80053 COMPREHEN METABOLIC PANEL: CPT | Performed by: HOSPITALIST

## 2024-06-18 PROCEDURE — 99999 PR PBB SHADOW E&M-EST. PATIENT-LVL III: CPT | Mod: PBBFAC,,, | Performed by: HOSPITALIST

## 2024-06-18 PROCEDURE — 99215 OFFICE O/P EST HI 40 MIN: CPT | Mod: S$PBB,,, | Performed by: HOSPITALIST

## 2024-06-18 PROCEDURE — 4010F ACE/ARB THERAPY RXD/TAKEN: CPT | Mod: CPTII,,, | Performed by: HOSPITALIST

## 2024-06-18 PROCEDURE — 84153 ASSAY OF PSA TOTAL: CPT | Mod: 91 | Performed by: HOSPITALIST

## 2024-06-18 PROCEDURE — 3008F BODY MASS INDEX DOCD: CPT | Mod: CPTII,,, | Performed by: HOSPITALIST

## 2024-06-18 PROCEDURE — 36415 COLL VENOUS BLD VENIPUNCTURE: CPT | Performed by: HOSPITALIST

## 2024-06-18 PROCEDURE — 3077F SYST BP >= 140 MM HG: CPT | Mod: CPTII,,, | Performed by: HOSPITALIST

## 2024-06-18 PROCEDURE — G2211 COMPLEX E/M VISIT ADD ON: HCPCS | Mod: S$PBB,,, | Performed by: HOSPITALIST

## 2024-06-18 PROCEDURE — 84153 ASSAY OF PSA TOTAL: CPT | Performed by: UROLOGY

## 2024-06-18 PROCEDURE — 99213 OFFICE O/P EST LOW 20 MIN: CPT | Mod: PBBFAC | Performed by: HOSPITALIST

## 2024-06-18 RX ORDER — CARVEDILOL 25 MG/1
25 TABLET ORAL 2 TIMES DAILY
Qty: 60 TABLET | Refills: 11 | Status: SHIPPED | OUTPATIENT
Start: 2024-06-18

## 2024-06-18 RX ORDER — SODIUM CHLORIDE 0.9 % (FLUSH) 0.9 %
10 SYRINGE (ML) INJECTION
Status: CANCELLED | OUTPATIENT
Start: 2024-06-20

## 2024-06-18 RX ORDER — MECLIZINE HYDROCHLORIDE 25 MG/1
25 TABLET ORAL 3 TIMES DAILY PRN
Qty: 90 TABLET | Refills: 0 | Status: SHIPPED | OUTPATIENT
Start: 2024-06-18

## 2024-06-18 RX ORDER — PREGABALIN 50 MG/1
50 CAPSULE ORAL 3 TIMES DAILY
Qty: 30 CAPSULE | Refills: 0 | Status: SHIPPED | OUTPATIENT
Start: 2024-06-18

## 2024-06-18 RX ORDER — HEPARIN 100 UNIT/ML
500 SYRINGE INTRAVENOUS
Status: CANCELLED | OUTPATIENT
Start: 2024-06-20

## 2024-06-18 NOTE — Clinical Note
Marlene - patient tells me nephrology ahs set up IVF infusion - I don't see it scheduled, just an appt with you. Let me know if you want me to arrange,.  Also - Dr. Roberts - patient is fairly hyperglycemic with Cr 1.8, wanted to let you know in case you needed to adjust dm2 regimen.  He is doing great from a prostate cancer perspective.

## 2024-06-18 NOTE — ASSESSMENT & PLAN NOTE
PSA remains undetectable with generally good tolerance of AAP + ADT. Final Lupron tomorrow with Dr. Edmond. Will continue abiraterone for additional 3 months then discontinue. Otherwise notable for elevated Cr and hyperglycemia today. Will coordinate with nephrology about getting VIF (patient thinks he is already scheduled). Also asked him to check with his PCP regarding hyperglycemia and metformin especially given Cr of  1.8.  - Con't ADT + AAP  - Lupron tomorrow  - FU 3 months; will stop AAP at that time  - Repeat DEXA 05/2025

## 2024-06-18 NOTE — PROGRESS NOTES
ADVANCED PROSTATE CANCER CLINIC - FOLLOW UP     Best Contact Phone Number(s): [phone] (home)       Cancer/Stage/TNM:    Cancer Staging   Prostate cancer metastatic to intrapelvic lymph node  Staging form: Prostate, AJCC 8th Edition  - Clinical: Stage IVB (cTX, cN1, cM1a, PSA: 2.8, Grade Group: 3) - Signed by Edward Zuleta MD on 2/1/2023        Reason for visit: Metachronous recurrence of prostate cancer following RALP and adjuvant RT to pelvic LN on ADT + AAP and salvage maren RT.    HPI: Mauro Benitez is a 58 y.o. male with history of prostate cancer sp RALP with positive margins 11/2/2015 with adjuvant RT to the prostate bed. He was subsequently found to have BCR with PSA 2.8  and maren relapse on PSMA imaging 06/2022. He started ADT 10/2022 with addition of /5 started 02/2023 with salvage RT to pelvic nodes 09/2023 - 10/2023.  Medical course notable for recurrent nausea and vomiting thought due to gastroparesis. He presents to medical oncology clinic for routine follow up.     History has been obtained by chart review and discussion with the patient.    Interval Events:     Received triptoreline 3 month 3/20/24. Has appt with Matte tomorrow. Seen in ED a f ew times in April and May.     Curretnyl doing OK. Has stopped smoking marijuana. Recently returned from [place]. No recent abdominal pain. No recent N/V and appetite is good. Reports normal urination. Denies obstructive urinary symptoms; no signficant nocturia. Continues on /5. No CP, does have some transient leg swelling after prolonged swelling. HR is elevated at 118; has missed recent doses of carvedilol. Cr also up today; has upcoming IVF as per neprhology,       Oncology History   Prostate cancer metastatic to intrapelvic lymph node   10/12/2015 Imaging Significant Findings    CT a/p: No evidence of distant or maren metastatic disease    Bone scan: No evidence of metastatic disease. 1cm rounded area of uptake along left  superomedial orbit/sphenoid thought possibly calcified menigioma.     11/2/2015 Surgery    Radical prostatectomy at [facility] in [place]. Pathology with prostate adenocarcinoma up to Jagdeep 4+3 with associated PNI. Positive margin. 0/4 LN involved. Staged pT3aN0     2/23/2016 Imaging Significant Findings    Nuclear bone scan: No evidence of metastatic disease. Note made of uptake in left supraorbital region thought c/w left frontal sinusitis along with uptake in mandible and maxilla c/w periosteal disease.     4/27/2021 Imaging Significant Findings    Bone scan:  There is abnormal increased radiotracer activity identified involving the maxilla and multiple paranasal sinuses likely related to periodontal disease and sinus disease. Recommend clinical correlation and further evaluation with maxillofacial CT as warranted.     6/6/2022 Tumor Markers    PSA 2.8     6/21/2022 Imaging Significant Findings    PSMA PET-CT: In this patient with prostate malignancy status post prostatectomy, there are tracer-avid left para-aortic, presacral/left common iliac, and left external iliac chain nodes as described above, concerning for recurrent metastatic disease.     9/16/2022 -  Hormone Therapy    Start bicalutamide 50mg po daily     10/3/2022 -  Hormone Therapy    Trelstar 11.25mg IM x1     12/19/2022 Tumor Markers    PSA 0.02     1/4/2023 -  Hormone Therapy    Trelstart 11.25mg IM x1 with Dr. Edmond     2/1/2023 -  Chemotherapy    Treatment Summary   Plan Name: OP ABIRATERONE DAILY PREDNISONE BID  Treatment Goal: Curative  Status: Active  Start Date: 2/1/2023  End Date: 2/1/2023  Provider: Edward Zuleta MD  Chemotherapy: abiraterone (ZYTIGA) 250 mg Tab, 250 mg (100 % of original dose 250 mg), Oral, Daily, 1 of 1 cycle, Start date: 2/1/2023, End date: --  Dose modification: 250 mg (original dose 250 mg, Cycle 1)     3/27/2023 Imaging Significant Findings    DEXA - Low risk osteopenia; repeat 2 years    "  3/29/2023 -  Hormone Therapy    Triptorelin 11.25mg     9/19/2023 - 10/26/2023 Radiation Therapy    Treating physician: Jagdish Mata  Total Dose: 50.4 Gy to the pelvic nodes  61.6 Gy to the PET + nodes   Fractions: 28 fractions at 1.8 and 2.2 Gy per fraction           Past Medical History:   Diagnosis Date    Aortic atherosclerosis 04/18/2024    Diabetes mellitus     GERD (gastroesophageal reflux disease)     Hypertension     Prostate cancer     Sleep apnea          Past Surgical History:   Procedure Laterality Date    ESOPHAGOGASTRODUODENOSCOPY N/A 12/5/2022    Procedure: EGD (ESOPHAGOGASTRODUODENOSCOPY);  Surgeon: Beronica Valera MD;  Location: [facility];  Service: Endoscopy;  Laterality: N/A;    PROSTATECTOMY           I have reviewed and updated the patient's past medical, surgical, family and social histories.     Review of patient's allergies indicates:   Allergen Reactions    Lisinopril Shortness Of Breath         Current Outpatient Medications   Medication Sig Dispense Refill    abiraterone (ZYTIGA) 250 mg Tab Take 1 tablet (250 mg total) by mouth once daily Take as directed with a low fat meal. 30 tablet 11    acetaminophen (TYLENOL) 500 MG tablet Take 1,500 mg by mouth daily as needed for Pain.      amitriptyline (ELAVIL) 25 MG tablet Take 25 mg by mouth every evening.      amLODIPine (NORVASC) 10 MG tablet Take 10 mg by mouth every evening.      aspirin (ECOTRIN) 81 MG EC tablet Aspir-81 mg tablet,delayed release   Take 1 tablet every day by oral route in the morning for 30 days.      atorvastatin (LIPITOR) 40 MG tablet atorvastatin 40 mg tablet   TAKE 1 TABLET BY MOUTH EVERY DAY AT BEDTIME      BD ULTRA-FINE XIAO PEN NEEDLE 32 gauge x 5/32" Ndle USE AS DIRECTED DAILY.      blood sugar diagnostic (ONETOUCH VERIO TEST STRIPS MISC) OneTouch Verio test strips      blood sugar diagnostic (ONETOUCH VERIO TEST STRIPS MISC) OneTouch Verio test strips   USE 1 TEST STRIP TO CHECK BLOOD SUGAR TWICE DAILY   "    blood sugar diagnostic Strp Accu-Chek Odalis Plus test strips   Take 1 strip twice a day by miscell. route.      carvediloL (COREG) 25 MG tablet Take 1 tablet (25 mg total) by mouth 2 (two) times daily. 60 tablet 11    cholecalciferol, vitamin D3, (VITAMIN D3) 25 mcg (1,000 unit) capsule Take 1 capsule (1,000 Units total) by mouth once daily. 30 capsule 11    fluticasone propionate (FLONASE) 50 mcg/actuation nasal spray fluticasone propionate 50 mcg/actuation nasal spray,suspension   SPRAY 2 SPRAYS EVERY DAY BY INTRANASAL ROUTE AS NEEDED.      insulin glargine 100 units/mL SubQ pen Lantus Solostar U-100 Insulin 100 unit/mL (3 mL) subcutaneous pen   Inject 10 units every day by subcutaneous route in the evening.      losartan (COZAAR) 100 MG tablet Take 100 mg by mouth once daily.      meclizine (ANTIVERT) 25 mg tablet Take 1 tablet (25 mg total) by mouth 3 (three) times daily as needed (dizziness). 90 tablet 0    metFORMIN (GLUCOPHAGE) 1000 MG tablet metformin 1,000 mg tablet   TAKE 1 TABLET BY MOUTH TWICE DAILY      mirabegron (MYRBETRIQ) 25 mg Tb24 ER tablet Take 1 tablet (25 mg total) by mouth once daily. 30 tablet 11    omeprazole (PRILOSEC) 20 MG capsule Take 20 mg by mouth.      ondansetron (ZOFRAN-ODT) 4 MG TbDL Take 1 tablet (4 mg total) by mouth every 6 (six) hours as needed (nausea). 10 tablet 0    predniSONE (DELTASONE) 5 MG tablet Take 1 tablet (5 mg total) by mouth once daily. Take as directed with water on an empty stomach. 30 tablet 11    pregabalin (LYRICA) 50 MG capsule Take 1 capsule (50 mg total) by mouth 3 (three) times daily. 30 capsule 0     Current Facility-Administered Medications   Medication Dose Route Frequency Provider Last Rate Last Admin    triptorelin pamoate SusR 11.25 mg  11.25 mg Intramuscular 1 time in Clinic/HOD Edward Emdond MD            Objective:      Physical Exam:   BP (!) 142/87 (BP Location: Left arm, Patient Position: Sitting, BP Method: Large (Automatic))   Pulse  (!) 126   Wt 90.2 kg (198 lb 15.4 oz)   SpO2 98%   BMI 27.75 kg/m²       ECOG Performance status: (1) Restricted in physically strenuous activity, ambulatory and able to do work of light nature     Physical Exam  Vitals and nursing note reviewed.   Constitutional:       Appearance: He is not ill-appearing.   Cardiovascular:      Rate and Rhythm: Normal rate and regular rhythm.      Pulses: Normal pulses.      Heart sounds: Normal heart sounds.   Pulmonary:      Effort: Pulmonary effort is normal.      Breath sounds: Normal breath sounds.   Abdominal:      General: Abdomen is flat.      Palpations: Abdomen is soft.   Neurological:      General: No focal deficit present.      Mental Status: He is oriented to person, place, and time.   Psychiatric:         Mood and Affect: Mood normal.          Recent Labs:   Lab Results   Component Value Date    WBC 7.65 06/18/2024    RBC 3.41 (L) 06/18/2024    HGB 10.1 (L) 06/18/2024    HCT 34.1 (L) 06/18/2024     (H) 06/18/2024    MCH 29.6 06/18/2024    MCHC 29.6 (L) 06/18/2024    RDW 15.1 (H) 06/18/2024     06/18/2024    MPV 10.0 06/18/2024    IMMGR 0.4 06/05/2024    GRAN 6.4 06/18/2024    IGABS 0.06 (H) 06/18/2024    LYMPH 0.9 (L) 06/05/2024    LYMPH 16.8 (L) 06/05/2024    MONO 0.6 06/05/2024    MONO 11.5 06/05/2024    EOS 0.1 06/05/2024    BASO 0.03 06/05/2024    NRBC 0 06/05/2024    EOSINOPHIL 2.5 06/05/2024    BASOPHIL 0.6 06/05/2024    DIFFMETHOD Automated 06/05/2024       Lab Results   Component Value Date     06/18/2024    K 4.4 06/18/2024     06/18/2024    CO2 20 (L) 06/18/2024     (H) 06/18/2024    BUN 19 06/18/2024    CREATININE 1.8 (H) 06/18/2024    CALCIUM 10.1 06/18/2024    PROT 7.5 06/18/2024    ALBUMIN 3.9 06/18/2024    BILITOT 0.6 06/18/2024    ALKPHOS 100 06/18/2024    AST 16 06/18/2024    ALT 19 06/18/2024    ANIONGAP 13 06/18/2024    EGFRNORACEVR 42.8 (A) 06/18/2024        Lab Results   Component Value Date    PSADIAG <0.01  06/18/2024    PSADIAG <0.01 04/30/2024    PSADIAG <0.01 03/19/2024    PSADIAG <0.01 02/05/2024    PSADIAG <0.01 12/26/2023    PSADIAG <0.01 11/29/2023    PSADIAG <0.01 10/20/2023    PSADIAG <0.01 09/08/2023    PSADIAG <0.01 07/21/2023    PSADIAG <0.01 06/09/2023    PSATOTAL <0.01 06/18/2024    PSATOTAL <0.01 09/08/2023    PSATOTAL <0.01 06/21/2023    PSATOTAL <0.01 03/22/2023    PSATOTAL 0.02 12/19/2022    PSATOTAL 2.8 06/06/2022        Cardiovascular Screening:  Primary care physician: Kosta Roberts MD      The 10-year ASCVD risk score (Niraj DK, et al., 2019) is: 40.2%    Values used to calculate the score:      Age: 58 years      Sex: Male      Is Non- : Yes      Diabetic: Yes      Tobacco smoker: Yes      Systolic Blood Pressure: 142 mmHg      Is BP treated: Yes      HDL Cholesterol: 51 mg/dL      Total Cholesterol: 158 mg/dL    ASCVD Risk Level: High risk >= 20%    EKG:   Results for orders placed or performed during the hospital encounter of 05/02/24   EKG 12-lead    Collection Time: 05/02/24  6:04 PM   Result Value Ref Range    QRS Duration 80 ms    OHS QTC Calculation 435 ms    Narrative    Test Reason : R10.13,    Vent. Rate : 087 BPM     Atrial Rate : 087 BPM     P-R Int : 164 ms          QRS Dur : 080 ms      QT Int : 362 ms       P-R-T Axes : 062 041 026 degrees     QTc Int : 435 ms    Normal sinus rhythm  Nonspecific ST abnormality  Abnormal ECG  When compared with ECG of 26-APR-2024 18:39,  Nonspecific T wave abnormality now evident in Lateral leads  Confirmed by Yoan Hodge MD ([id number]) on 5/3/2024 2:09:25 PM    Referred By: JEEVAN CALLAHAN           Confirmed By:Yoan Hodge MD       High blood pressure = Yes  Antihypertensive agents: amLODIPine - 10 MG  carvediloL - 25 MG  losartan - 100 MG   Comments:     DM2: Yes  Antidiabetic agents: insulin glargine U-100 (Lantus) Inpn - 100 unit/mL (3 mL)  metFORMIN - 1000 MG   Comments:     Hyperlipidemia: Yes  Lipid lowering  agents: atorvastatin - 40 MG   Comments:     Antiplatelet therapy: Yes  Agent: aspirin - 81 MG   Comment:     Body mass index is 27.75 kg/m².  If greater than 30, referral to nutrition    Lab Results   Component Value Date    CHOL 158 07/21/2023    LDLCALC 90.8 07/21/2023    HDL 51 07/21/2023    TRIG 81 07/21/2023    HGBA1C 7.0 (H) 05/12/2023        Bone Health    Lab Results   Component Value Date    [id number] 30 01/23/2024        Results for orders placed during the hospital encounter of 03/14/23    DXA Bone Density with Vertebral Fracture    Impression  *Low bone mass (Osteopenia); FRAX calculations do not support treatment as osteoporosis.    RECOMMENDATIONS:  *Daily calcium intake 0852-8755 mg, dietary sources preferred; Vitamin D 1419-8719 IU daily.  *Weight bearing exercise and fall precautions.  *If dedicated imaging is negative for vertebral fracture, then no additional treatment is recommended at this time. If positive for fracture, would treat as osteoporosis.  *If patient smokes would recommend cessation.  *Repeat BMD in 2 years.    EXPLANATION OF RESULTS:  T-score compares these results to the average bone density of a 20-29 year-old of the same gender.    Z-score compares this result to the average bone density to people of the same age, gender, and race.    The amounts indicate the number of standard deviations above or below the mean.    * Osteoporosis is generally defined as having a T-score between less than or equal to -2.5.    * Low bone mass (osteopenia) is generally defined as having a T-score between -1.0 and -2.5.    * The normal range is generally defined as having a T-score greater than or equal to -1.0.    * Calculated FRAX scores for fracture risk prediction may not be accurate in the setting of certain clinical factors such as pharmacologic therapy for osteoporosis, prior fragility fractures, high dose glucocorticoid use.      Electronically signed by: Tayla Amato  MD  Date:    03/27/2023  Time:    07:46       Vitamin D: 1000 IU daily  Calcium: Recommend 4 servings of dairy daily     Osteopenia/Osteoporosis: Osteopenia    Bone strengthening agent: None     Staging Imaging     Results for orders placed during the hospital encounter of 06/21/22    NM PET CT F 18 PYL PSMA, Midthigh to Vertex    Impression  In this patient with prostate malignancy status post prostatectomy, there are tracer-avid left para-aortic, presacral/left common iliac, and left external iliac chain nodes as described above, concerning for recurrent metastatic disease.    Additional findings as above.    This report was flagged in Epic as abnormal.    I, Narayan Harding MD, attest that I reviewed and interpreted the images.    Electronically signed by resident: Harshil Castaneda  Date:    06/21/2022  Time:    15:45    Electronically signed by: Narayan Harding  Date:    06/21/2022  Time:    17:34       Results for orders placed during the hospital encounter of 04/27/21    NM Bone Scan Whole Body    Impression  There is abnormal increased radiotracer activity identified involving the maxilla and multiple paranasal sinuses likely related to periodontal disease and sinus disease. Recommend clinical correlation and further evaluation with maxillofacial CT as warranted.      Electronically signed by: Anshul Ayon MD  Date:    04/27/2021  Time:    13:19      No results found for this or any previous visit.       Results for orders placed during the hospital encounter of 04/30/23    CT Abdomen Pelvis With Contrast    Impression  No acute abdominal or pelvic pathology CT of the abdomen and pelvis with contrast.    Mild fatty infiltration of the liver.    Indeterminate adrenal lesions, which were not increased in size.    Indeterminate 11-12 mm left upper pole renal cystic lesion.  Findings may represent a complex cyst.  If warranted, consider follow-up renal ultrasound when clinically appropriate.    Questionably a mildly thickened  urinary bladder wall.  Findings may be due to infection or lower under distension.    Colonic diverticulosis.    This report was flagged in Epic as abnormal.    The preliminary and final reports are near concordant.      Electronically signed by: Anna Proctor  Date:    04/30/2023  Time:    19:04       I have personally reviewed the above imaging.     Path:   Reviewed pathology as documented above.    Genomic testing:     Germline genetic testing  No results found for this or any previous visit.     Somatic tumor genotyping:      ctDNA genotyping:  Miscellaneous Genomic Results       Tumor NGS Blood  Resulted: 5/21/23 Status: Final result       Detected - Pathogenic (2)      Gene Variant Allelic State & Phase   TP53 p.R282W - c.844C>T Missense variant - LOF --   TP53 p.L194R - c.581T>G Missense variant - LOF --                             Diagnoses:     1. Prostate cancer metastatic to intrapelvic lymph node    2. Hypertension, unspecified type    3. Diabetic peripheral neuropathy    4. Androgen deprivation therapy    5. Type 2 diabetes mellitus with diabetic polyneuropathy, without long-term current use of insulin    6. Osteopenia, unspecified location                  Assessment and Plan:     1. Prostate cancer metastatic to intrapelvic lymph node  Overview:  Intermediate risk Jagdeep 4+3 disease sp R1 prostatectomy 2015 with adjuvant RT presumably to prostate bed. Now with PSA recurrence and PSMA imaging with regional maren metastases and limited intraabdominal maren mets.Started intensified hormonal therapy with ADT + abiraterone 02/2023. Completed salvage RT with Dr. Mata      Assessment & Plan:  PSA remains undetectable with generally good tolerance of AAP + ADT. Final Lupron tomorrow with Dr. Edmond. Will continue abiraterone for additional 3 months then discontinue. Otherwise notable for elevated Cr and hyperglycemia today. Will coordinate with nephrology about getting VIF (patient thinks he is  already scheduled). Also asked him to check with his PCP regarding hyperglycemia and metformin especially given Cr of  1.8.  - Con't ADT + AAP  - Lupron tomorrow  - FU 3 months; will stop AAP at that time  - Repeat DEXA 05/2025      2. Hypertension, unspecified type  Overview:  Home medications include carvedilol, amlodipine, and losartan.     Assessment & Plan:  - Out of carvedilol; refill given today    Orders:  -     carvediloL (COREG) 25 MG tablet; Take 1 tablet (25 mg total) by mouth 2 (two) times daily.  Dispense: 60 tablet; Refill: 11    3. Diabetic peripheral neuropathy  Overview:  Previously on gabapentin and amitryptiline. Currently on     Orders:  -     pregabalin (LYRICA) 50 MG capsule; Take 1 capsule (50 mg total) by mouth 3 (three) times daily.  Dispense: 30 capsule; Refill: 0  -     meclizine (ANTIVERT) 25 mg tablet; Take 1 tablet (25 mg total) by mouth 3 (three) times daily as needed (dizziness).  Dispense: 90 tablet; Refill: 0    4. Androgen deprivation therapy  Overview:          5. Type 2 diabetes mellitus with diabetic polyneuropathy, without long-term current use of insulin  Overview:  Complicated by peripheral neuropathy and potentially gastroparesis. Currently on metformin and lantus.      6. Osteopenia, unspecified location  Overview:  Low risk osteopenia by bone mineral density 03/2023.    Assessment & Plan:  - Con't vitamin D  - Repeat 05/2025      Other orders  -     sodium chloride 0.9% bolus 1,000 mL 1,000 mL  -     sodium chloride 0.9% flush 10 mL  -     heparin, porcine (PF) 100 unit/mL injection flush 500 Units  -     alteplase injection 2 mg            Follow up:   Route Chart for Scheduling    Med Onc Chart Routing      Follow up with physician 3 months.   Follow up with DONAVAN    Infusion scheduling note    Injection scheduling note    Labs CBC, CMP and PSA   Scheduling:  Preferred lab:  Lab interval:     Imaging    Pharmacy appointment    Other referrals                   Treatment  Plan Information   OP ABIRATERONE DAILY PREDNISONE BID   Edward Zuleta MD   Upcoming Treatment Dates - OP ABIRATERONE DAILY PREDNISONE BID    No upcoming days in selected categories.    Supportive Plan Information  IV FLUIDS AND ELECTROLYTES   Edward Zuleta MD   Upcoming Treatment Dates - IV FLUIDS AND ELECTROLYTES    No upcoming days in selected categories.    Therapy Plan Information  Flushes  sodium chloride 0.9% flush 10 mL  10 mL, Intravenous, PRN  heparin, porcine (PF) 100 unit/mL injection flush 500 Units  500 Units, Intravenous, PRN         The above information has been reviewed with the patient and all questions have been answered to their apparent satisfaction.  They understand that they can call the clinic with any questions.    Edward Zuleta

## 2024-06-19 ENCOUNTER — OFFICE VISIT (OUTPATIENT)
Dept: UROLOGY | Facility: CLINIC | Age: 59
End: 2024-06-19
Payer: MEDICAID

## 2024-06-19 VITALS
WEIGHT: 198.44 LBS | HEIGHT: 71 IN | SYSTOLIC BLOOD PRESSURE: 155 MMHG | BODY MASS INDEX: 27.78 KG/M2 | HEART RATE: 135 BPM | DIASTOLIC BLOOD PRESSURE: 90 MMHG

## 2024-06-19 DIAGNOSIS — C61 PROSTATE CANCER METASTATIC TO INTRAPELVIC LYMPH NODE: Primary | ICD-10-CM

## 2024-06-19 DIAGNOSIS — C77.5 PROSTATE CANCER METASTATIC TO INTRAPELVIC LYMPH NODE: Primary | ICD-10-CM

## 2024-06-19 DIAGNOSIS — Z79.818 ENCOUNTER FOR MONITORING ANDROGEN DEPRIVATION THERAPY: ICD-10-CM

## 2024-06-19 PROCEDURE — 1159F MED LIST DOCD IN RCRD: CPT | Mod: CPTII,,, | Performed by: UROLOGY

## 2024-06-19 PROCEDURE — 3008F BODY MASS INDEX DOCD: CPT | Mod: CPTII,,, | Performed by: UROLOGY

## 2024-06-19 PROCEDURE — 99214 OFFICE O/P EST MOD 30 MIN: CPT | Mod: PBBFAC,PO,25 | Performed by: UROLOGY

## 2024-06-19 PROCEDURE — 99215 OFFICE O/P EST HI 40 MIN: CPT | Mod: S$PBB,,, | Performed by: UROLOGY

## 2024-06-19 PROCEDURE — 1160F RVW MEDS BY RX/DR IN RCRD: CPT | Mod: CPTII,,, | Performed by: UROLOGY

## 2024-06-19 PROCEDURE — 3077F SYST BP >= 140 MM HG: CPT | Mod: CPTII,,, | Performed by: UROLOGY

## 2024-06-19 PROCEDURE — 99999PBSHW PR PBB SHADOW TECHNICAL ONLY FILED TO HB: Mod: JZ,PBBFAC,,

## 2024-06-19 PROCEDURE — 3066F NEPHROPATHY DOC TX: CPT | Mod: CPTII,,, | Performed by: UROLOGY

## 2024-06-19 PROCEDURE — 4010F ACE/ARB THERAPY RXD/TAKEN: CPT | Mod: CPTII,,, | Performed by: UROLOGY

## 2024-06-19 PROCEDURE — 99999 PR PBB SHADOW E&M-EST. PATIENT-LVL IV: CPT | Mod: PBBFAC,,, | Performed by: UROLOGY

## 2024-06-19 PROCEDURE — 3080F DIAST BP >= 90 MM HG: CPT | Mod: CPTII,,, | Performed by: UROLOGY

## 2024-06-19 PROCEDURE — 96402 CHEMO HORMON ANTINEOPL SQ/IM: CPT | Mod: PBBFAC,PO

## 2024-06-19 RX ADMIN — TRIPTORELIN PAMOATE 11.25 MG: KIT at 11:06

## 2024-06-19 NOTE — PATIENT INSTRUCTIONS
Patient to receive last shot of Trelstar today  Will follow-up quarterly with PSA testing if PSA testing done by other physicians will monitor PSA quarterly with review of patient and his symptoms otherwise I will order the PSA test.  After 2 years if no PSA recurrence will extend out to every 6 months.

## 2024-06-19 NOTE — PROGRESS NOTES
Subjective:      Patient ID: Mauro Benitez is a 58 y.o. male.    Chief Complaint: trelstar     Mr. Wade is 58-year-old gentleman with a history of radical robotic assisted prostatectomy in 2015.  Patient was found have recurrent follow up requiring radiation therapy and hormonal ablation.  The patient has been followed by Radiation Oncology, Hematology Oncology and myself.  He has been on androgen deprivation therapy.  Today is his last scheduled dose of Trelstar.  He will stay on abiraterone for another 3 months per his hematology oncology physician.  The patient is doing well without complaints.  Testosterone taking yesterday was less than 4, PSA was less than 0.01 ng/dL.    Follow-up  This is a chronic (Follow up for metastatic prostate cancer) problem. The current episode started more than 1 year ago. The problem occurs constantly. The problem has been rapidly improving. Pertinent negatives include no abdominal pain, anorexia, arthralgias, change in bowel habit, chest pain, chills, congestion, coughing, diaphoresis, fatigue, fever, headaches, joint swelling, myalgias, nausea, neck pain, numbness, rash, sore throat, swollen glands, urinary symptoms, vertigo, visual change, vomiting or weakness. Nothing aggravates the symptoms. He has tried nothing for the symptoms. The treatment provided significant relief.   Review of Systems   Constitutional:  Negative for activity change, appetite change, chills, diaphoresis, fatigue, fever and unexpected weight change.   HENT:  Negative for congestion, hearing loss, sinus pressure, sore throat and trouble swallowing.    Eyes:  Negative for photophobia, pain, discharge and visual disturbance.   Respiratory:  Negative for apnea, cough and shortness of breath.    Cardiovascular:  Negative for chest pain, palpitations and leg swelling.   Gastrointestinal:  Negative for abdominal distention, abdominal pain, anal bleeding, anorexia, blood in stool, change in bowel habit,  constipation, diarrhea, nausea, rectal pain and vomiting.   Endocrine: Negative for cold intolerance, heat intolerance, polydipsia, polyphagia and polyuria.   Genitourinary:  Negative for decreased urine volume, difficulty urinating, dysuria, enuresis, flank pain, frequency, genital sores, hematuria, penile discharge, penile pain, penile swelling, scrotal swelling, testicular pain and urgency.   Musculoskeletal:  Negative for arthralgias, back pain, joint swelling, myalgias and neck pain.   Skin:  Negative for color change, pallor, rash and wound.   Allergic/Immunologic: Negative for environmental allergies, food allergies and immunocompromised state.   Neurological:  Negative for dizziness, vertigo, seizures, weakness, numbness and headaches.   Hematological:  Negative for adenopathy. Does not bruise/bleed easily.   Psychiatric/Behavioral: Negative.      Objective:     Physical Exam  Vitals and nursing note reviewed.   Constitutional:       General: He is not in acute distress.     Appearance: Normal appearance. He is well-developed and normal weight. He is not ill-appearing, toxic-appearing or diaphoretic.   HENT:      Head: Normocephalic and atraumatic.      Right Ear: External ear normal. There is no impacted cerumen.      Left Ear: External ear normal. There is no impacted cerumen.      Nose: Nose normal. No congestion or rhinorrhea.      Mouth/Throat:      Pharynx: No oropharyngeal exudate or posterior oropharyngeal erythema.   Eyes:      General: No scleral icterus.        Right eye: No discharge.         Left eye: No discharge.      Conjunctiva/sclera: Conjunctivae normal.      Pupils: Pupils are equal, round, and reactive to light.   Cardiovascular:      Rate and Rhythm: Normal rate and regular rhythm.      Pulses: Normal pulses.      Heart sounds: Normal heart sounds.   Pulmonary:      Effort: Pulmonary effort is normal.   Abdominal:      General: Bowel sounds are normal. There is no distension.       Palpations: Abdomen is soft. There is no mass.      Tenderness: There is no abdominal tenderness. There is no right CVA tenderness, left CVA tenderness, guarding or rebound.      Hernia: No hernia is present.   Genitourinary:     Penis: Normal.       Testes: Normal.      Comments: Patient with no CVA tenderness, normal penis and scrotum.  Bladder nontender.  Musculoskeletal:         General: Normal range of motion.      Cervical back: Normal range of motion and neck supple.      Right lower leg: No edema.      Left lower leg: No edema.   Skin:     General: Skin is warm and dry.      Capillary Refill: Capillary refill takes 2 to 3 seconds.      Findings: No lesion or rash.   Neurological:      Mental Status: He is alert and oriented to person, place, and time.      Deep Tendon Reflexes: Reflexes are normal and symmetric.   Psychiatric:         Mood and Affect: Mood normal.         Behavior: Behavior normal.         Thought Content: Thought content normal.         Judgment: Judgment normal.      Assessment:      1. Prostate cancer metastatic to intrapelvic lymph node    2. Encounter for monitoring androgen deprivation therapy      Plan:     Patient Instructions   Patient to receive last shot of Trelstar today  Will follow-up quarterly with PSA testing if PSA testing done by other physicians will monitor PSA quarterly with review of patient and his symptoms otherwise I will order the PSA test.  After 2 years if no PSA recurrence will extend out to every 6 months.

## 2024-06-19 NOTE — PROGRESS NOTES
Patient waited in clinic for 15 after administration of Trelstar 11.25 into right upper outer quad of right buttock to observe for adverse reaction. None were reported or observed. Patient departed clinic with no change in assessment.

## 2024-07-05 ENCOUNTER — HOSPITAL ENCOUNTER (INPATIENT)
Facility: HOSPITAL | Age: 59
LOS: 3 days | Discharge: HOME | DRG: 399 | End: 2024-07-08
Attending: SURGERY | Admitting: STUDENT IN AN ORGANIZED HEALTH CARE EDUCATION/TRAINING PROGRAM
Payer: MEDICAID

## 2024-07-05 ENCOUNTER — ANESTHESIA EVENT (OUTPATIENT)
Dept: SURGERY | Facility: HOSPITAL | Age: 59
End: 2024-07-05
Payer: MEDICAID

## 2024-07-05 ENCOUNTER — ANESTHESIA (OUTPATIENT)
Dept: SURGERY | Facility: HOSPITAL | Age: 59
End: 2024-07-05
Payer: MEDICAID

## 2024-07-05 DIAGNOSIS — K35.33 ABSCESS, PERIAPPENDICEAL: ICD-10-CM

## 2024-07-05 DIAGNOSIS — K35.80 ACUTE APPENDICITIS: Primary | ICD-10-CM

## 2024-07-05 LAB — POCT GLUCOSE: 150 MG/DL (ref 70–110)

## 2024-07-05 PROCEDURE — 0DTJ4ZZ RESECTION OF APPENDIX, PERCUTANEOUS ENDOSCOPIC APPROACH: ICD-10-PCS | Performed by: SURGERY

## 2024-07-05 PROCEDURE — 25000003 PHARM REV CODE 250

## 2024-07-05 PROCEDURE — C1729 CATH, DRAINAGE: HCPCS | Performed by: SURGERY

## 2024-07-05 PROCEDURE — 27201423 OPTIME MED/SURG SUP & DEVICES STERILE SUPPLY: Performed by: SURGERY

## 2024-07-05 PROCEDURE — 36000708 HC OR TIME LEV III 1ST 15 MIN: Performed by: SURGERY

## 2024-07-05 PROCEDURE — 88304 TISSUE EXAM BY PATHOLOGIST: CPT | Performed by: PATHOLOGY

## 2024-07-05 PROCEDURE — 63600175 PHARM REV CODE 636 W HCPCS: Mod: JZ,JG | Performed by: SURGERY

## 2024-07-05 PROCEDURE — 37000009 HC ANESTHESIA EA ADD 15 MINS: Performed by: SURGERY

## 2024-07-05 PROCEDURE — 99222 1ST HOSP IP/OBS MODERATE 55: CPT | Mod: ,,, | Performed by: SURGERY

## 2024-07-05 PROCEDURE — 25000003 PHARM REV CODE 250: Performed by: SURGERY

## 2024-07-05 PROCEDURE — 63600175 PHARM REV CODE 636 W HCPCS: Performed by: STUDENT IN AN ORGANIZED HEALTH CARE EDUCATION/TRAINING PROGRAM

## 2024-07-05 PROCEDURE — 44970 LAPAROSCOPY APPENDECTOMY: CPT | Mod: ,,, | Performed by: SURGERY

## 2024-07-05 PROCEDURE — 63600175 PHARM REV CODE 636 W HCPCS

## 2024-07-05 PROCEDURE — 25000003 PHARM REV CODE 250: Performed by: NURSE ANESTHETIST, CERTIFIED REGISTERED

## 2024-07-05 PROCEDURE — 37000008 HC ANESTHESIA 1ST 15 MINUTES: Performed by: SURGERY

## 2024-07-05 PROCEDURE — 25000003 PHARM REV CODE 250: Performed by: STUDENT IN AN ORGANIZED HEALTH CARE EDUCATION/TRAINING PROGRAM

## 2024-07-05 PROCEDURE — 88304 TISSUE EXAM BY PATHOLOGIST: CPT | Mod: 26,,, | Performed by: PATHOLOGY

## 2024-07-05 PROCEDURE — 71000033 HC RECOVERY, INTIAL HOUR: Performed by: SURGERY

## 2024-07-05 PROCEDURE — 36000709 HC OR TIME LEV III EA ADD 15 MIN: Performed by: SURGERY

## 2024-07-05 PROCEDURE — 11000001 HC ACUTE MED/SURG PRIVATE ROOM

## 2024-07-05 PROCEDURE — 63600175 PHARM REV CODE 636 W HCPCS: Performed by: NURSE ANESTHETIST, CERTIFIED REGISTERED

## 2024-07-05 RX ORDER — ACETAMINOPHEN 325 MG/1
650 TABLET ORAL EVERY 8 HOURS
Status: DISCONTINUED | OUTPATIENT
Start: 2024-07-05 | End: 2024-07-08 | Stop reason: HOSPADM

## 2024-07-05 RX ORDER — MIDAZOLAM HYDROCHLORIDE 1 MG/ML
INJECTION INTRAMUSCULAR; INTRAVENOUS
Status: DISCONTINUED | OUTPATIENT
Start: 2024-07-05 | End: 2024-07-05

## 2024-07-05 RX ORDER — PROCHLORPERAZINE EDISYLATE 5 MG/ML
5 INJECTION INTRAMUSCULAR; INTRAVENOUS EVERY 30 MIN PRN
Status: DISCONTINUED | OUTPATIENT
Start: 2024-07-05 | End: 2024-07-05

## 2024-07-05 RX ORDER — LABETALOL HYDROCHLORIDE 5 MG/ML
10 INJECTION, SOLUTION INTRAVENOUS EVERY 6 HOURS PRN
Status: DISCONTINUED | OUTPATIENT
Start: 2024-07-05 | End: 2024-07-08 | Stop reason: HOSPADM

## 2024-07-05 RX ORDER — GLUCAGON 1 MG
1 KIT INJECTION
Status: DISCONTINUED | OUTPATIENT
Start: 2024-07-05 | End: 2024-07-08 | Stop reason: HOSPADM

## 2024-07-05 RX ORDER — MUPIROCIN 20 MG/G
OINTMENT TOPICAL 2 TIMES DAILY
Status: DISCONTINUED | OUTPATIENT
Start: 2024-07-05 | End: 2024-07-08 | Stop reason: HOSPADM

## 2024-07-05 RX ORDER — KETOROLAC TROMETHAMINE 30 MG/ML
INJECTION, SOLUTION INTRAMUSCULAR; INTRAVENOUS
Status: DISCONTINUED | OUTPATIENT
Start: 2024-07-05 | End: 2024-07-05

## 2024-07-05 RX ORDER — TALC
6 POWDER (GRAM) TOPICAL NIGHTLY PRN
Status: DISCONTINUED | OUTPATIENT
Start: 2024-07-05 | End: 2024-07-08 | Stop reason: HOSPADM

## 2024-07-05 RX ORDER — LIDOCAINE HYDROCHLORIDE 20 MG/ML
INJECTION INTRAVENOUS
Status: DISCONTINUED | OUTPATIENT
Start: 2024-07-05 | End: 2024-07-05

## 2024-07-05 RX ORDER — HYDRALAZINE HYDROCHLORIDE 20 MG/ML
10 INJECTION INTRAMUSCULAR; INTRAVENOUS EVERY 6 HOURS PRN
Status: DISCONTINUED | OUTPATIENT
Start: 2024-07-05 | End: 2024-07-08 | Stop reason: HOSPADM

## 2024-07-05 RX ORDER — HYDROMORPHONE HYDROCHLORIDE 2 MG/ML
INJECTION, SOLUTION INTRAMUSCULAR; INTRAVENOUS; SUBCUTANEOUS
Status: DISCONTINUED | OUTPATIENT
Start: 2024-07-05 | End: 2024-07-05

## 2024-07-05 RX ORDER — PROPOFOL 10 MG/ML
VIAL (ML) INTRAVENOUS
Status: DISCONTINUED | OUTPATIENT
Start: 2024-07-05 | End: 2024-07-05

## 2024-07-05 RX ORDER — ASPIRIN 81 MG/1
81 TABLET ORAL DAILY
Status: DISCONTINUED | OUTPATIENT
Start: 2024-07-06 | End: 2024-07-08 | Stop reason: HOSPADM

## 2024-07-05 RX ORDER — PREGABALIN 50 MG/1
50 CAPSULE ORAL 3 TIMES DAILY
Status: DISCONTINUED | OUTPATIENT
Start: 2024-07-05 | End: 2024-07-08 | Stop reason: HOSPADM

## 2024-07-05 RX ORDER — HYDROMORPHONE HYDROCHLORIDE 2 MG/ML
0.2 INJECTION, SOLUTION INTRAMUSCULAR; INTRAVENOUS; SUBCUTANEOUS EVERY 5 MIN PRN
Status: DISCONTINUED | OUTPATIENT
Start: 2024-07-05 | End: 2024-07-05

## 2024-07-05 RX ORDER — ONDANSETRON HYDROCHLORIDE 2 MG/ML
INJECTION, SOLUTION INTRAVENOUS
Status: DISCONTINUED | OUTPATIENT
Start: 2024-07-05 | End: 2024-07-05

## 2024-07-05 RX ORDER — ROCURONIUM BROMIDE 10 MG/ML
INJECTION, SOLUTION INTRAVENOUS
Status: DISCONTINUED | OUTPATIENT
Start: 2024-07-05 | End: 2024-07-05

## 2024-07-05 RX ORDER — ACETAMINOPHEN 325 MG/1
650 TABLET ORAL EVERY 8 HOURS PRN
Status: DISCONTINUED | OUTPATIENT
Start: 2024-07-05 | End: 2024-07-08 | Stop reason: HOSPADM

## 2024-07-05 RX ORDER — IBUPROFEN 400 MG/1
400 TABLET ORAL
Status: DISCONTINUED | OUTPATIENT
Start: 2024-07-05 | End: 2024-07-08 | Stop reason: HOSPADM

## 2024-07-05 RX ORDER — DEXAMETHASONE SODIUM PHOSPHATE 4 MG/ML
INJECTION, SOLUTION INTRA-ARTICULAR; INTRALESIONAL; INTRAMUSCULAR; INTRAVENOUS; SOFT TISSUE
Status: DISCONTINUED | OUTPATIENT
Start: 2024-07-05 | End: 2024-07-05

## 2024-07-05 RX ORDER — OXYCODONE HYDROCHLORIDE 5 MG/1
5 TABLET ORAL
Status: DISCONTINUED | OUTPATIENT
Start: 2024-07-05 | End: 2024-07-05

## 2024-07-05 RX ORDER — ACETAMINOPHEN 10 MG/ML
INJECTION, SOLUTION INTRAVENOUS
Status: DISCONTINUED | OUTPATIENT
Start: 2024-07-05 | End: 2024-07-05

## 2024-07-05 RX ORDER — DEXMEDETOMIDINE HYDROCHLORIDE 100 UG/ML
INJECTION, SOLUTION INTRAVENOUS
Status: DISCONTINUED | OUTPATIENT
Start: 2024-07-05 | End: 2024-07-05

## 2024-07-05 RX ORDER — SODIUM CHLORIDE, SODIUM LACTATE, POTASSIUM CHLORIDE, CALCIUM CHLORIDE 600; 310; 30; 20 MG/100ML; MG/100ML; MG/100ML; MG/100ML
INJECTION, SOLUTION INTRAVENOUS CONTINUOUS
Status: DISCONTINUED | OUTPATIENT
Start: 2024-07-05 | End: 2024-07-06

## 2024-07-05 RX ORDER — DEXTROSE MONOHYDRATE, SODIUM CHLORIDE, AND POTASSIUM CHLORIDE 50; 1.49; 4.5 G/1000ML; G/1000ML; G/1000ML
INJECTION, SOLUTION INTRAVENOUS CONTINUOUS
Status: DISCONTINUED | OUTPATIENT
Start: 2024-07-05 | End: 2024-07-05

## 2024-07-05 RX ORDER — DEXTROSE MONOHYDRATE 100 MG/ML
12.5 INJECTION, SOLUTION INTRAVENOUS
Status: DISCONTINUED | OUTPATIENT
Start: 2024-07-05 | End: 2024-07-08 | Stop reason: HOSPADM

## 2024-07-05 RX ORDER — SODIUM CHLORIDE 0.9 % (FLUSH) 0.9 %
10 SYRINGE (ML) INJECTION
Status: DISCONTINUED | OUTPATIENT
Start: 2024-07-05 | End: 2024-07-08 | Stop reason: HOSPADM

## 2024-07-05 RX ORDER — ONDANSETRON 8 MG/1
8 TABLET, ORALLY DISINTEGRATING ORAL EVERY 8 HOURS PRN
Status: DISCONTINUED | OUTPATIENT
Start: 2024-07-05 | End: 2024-07-08 | Stop reason: HOSPADM

## 2024-07-05 RX ORDER — SODIUM CHLORIDE 9 MG/ML
INJECTION, SOLUTION INTRAVENOUS
Status: DISCONTINUED | OUTPATIENT
Start: 2024-07-05 | End: 2024-07-08 | Stop reason: HOSPADM

## 2024-07-05 RX ORDER — KETAMINE HCL IN 0.9 % NACL 50 MG/5 ML
SYRINGE (ML) INTRAVENOUS
Status: DISCONTINUED | OUTPATIENT
Start: 2024-07-05 | End: 2024-07-05

## 2024-07-05 RX ORDER — BUPIVACAINE HYDROCHLORIDE 5 MG/ML
INJECTION, SOLUTION EPIDURAL; INTRACAUDAL
Status: DISCONTINUED | OUTPATIENT
Start: 2024-07-05 | End: 2024-07-05 | Stop reason: HOSPADM

## 2024-07-05 RX ORDER — FENTANYL CITRATE 50 UG/ML
INJECTION, SOLUTION INTRAMUSCULAR; INTRAVENOUS
Status: DISCONTINUED | OUTPATIENT
Start: 2024-07-05 | End: 2024-07-05

## 2024-07-05 RX ORDER — CEFAZOLIN SODIUM 1 G/3ML
INJECTION, POWDER, FOR SOLUTION INTRAMUSCULAR; INTRAVENOUS
Status: DISCONTINUED | OUTPATIENT
Start: 2024-07-05 | End: 2024-07-05

## 2024-07-05 RX ORDER — INSULIN ASPART 100 [IU]/ML
0-5 INJECTION, SOLUTION INTRAVENOUS; SUBCUTANEOUS EVERY 6 HOURS PRN
Status: DISCONTINUED | OUTPATIENT
Start: 2024-07-05 | End: 2024-07-08 | Stop reason: HOSPADM

## 2024-07-05 RX ORDER — ATORVASTATIN CALCIUM 40 MG/1
40 TABLET, FILM COATED ORAL NIGHTLY
Status: DISCONTINUED | OUTPATIENT
Start: 2024-07-05 | End: 2024-07-08 | Stop reason: HOSPADM

## 2024-07-05 RX ORDER — PHENYLEPHRINE HYDROCHLORIDE 10 MG/ML
INJECTION INTRAVENOUS
Status: DISCONTINUED | OUTPATIENT
Start: 2024-07-05 | End: 2024-07-05

## 2024-07-05 RX ORDER — OXYCODONE HYDROCHLORIDE 5 MG/1
5 TABLET ORAL EVERY 4 HOURS PRN
Status: DISCONTINUED | OUTPATIENT
Start: 2024-07-05 | End: 2024-07-08 | Stop reason: HOSPADM

## 2024-07-05 RX ORDER — CARVEDILOL 25 MG/1
25 TABLET ORAL 2 TIMES DAILY
Status: DISCONTINUED | OUTPATIENT
Start: 2024-07-05 | End: 2024-07-08 | Stop reason: HOSPADM

## 2024-07-05 RX ADMIN — IBUPROFEN 400 MG: 400 TABLET ORAL at 01:07

## 2024-07-05 RX ADMIN — LIDOCAINE HYDROCHLORIDE 100 MG: 20 INJECTION, SOLUTION INTRAVENOUS at 02:07

## 2024-07-05 RX ADMIN — ACETAMINOPHEN 1000 MG: 10 INJECTION, SOLUTION INTRAVENOUS at 02:07

## 2024-07-05 RX ADMIN — SODIUM CHLORIDE, SODIUM LACTATE, POTASSIUM CHLORIDE, AND CALCIUM CHLORIDE: .6; .31; .03; .02 INJECTION, SOLUTION INTRAVENOUS at 03:07

## 2024-07-05 RX ADMIN — SODIUM CHLORIDE: 9 INJECTION, SOLUTION INTRAVENOUS at 08:07

## 2024-07-05 RX ADMIN — SUGAMMADEX 200 MG: 100 INJECTION, SOLUTION INTRAVENOUS at 03:07

## 2024-07-05 RX ADMIN — MIDAZOLAM HYDROCHLORIDE 2 MG: 1 INJECTION, SOLUTION INTRAMUSCULAR; INTRAVENOUS at 02:07

## 2024-07-05 RX ADMIN — OXYCODONE 5 MG: 5 TABLET ORAL at 12:07

## 2024-07-05 RX ADMIN — IBUPROFEN 400 MG: 400 TABLET ORAL at 08:07

## 2024-07-05 RX ADMIN — HYDROMORPHONE HYDROCHLORIDE 0.5 MG: 2 INJECTION INTRAMUSCULAR; INTRAVENOUS; SUBCUTANEOUS at 03:07

## 2024-07-05 RX ADMIN — OXYCODONE 5 MG: 5 TABLET ORAL at 10:07

## 2024-07-05 RX ADMIN — DEXMEDETOMIDINE HCL 8 MCG: 100 INJECTION INTRAVENOUS at 03:07

## 2024-07-05 RX ADMIN — CEFAZOLIN 2 G: 330 INJECTION, POWDER, FOR SOLUTION INTRAMUSCULAR; INTRAVENOUS at 02:07

## 2024-07-05 RX ADMIN — HYDROMORPHONE HYDROCHLORIDE 0.2 MG: 2 INJECTION INTRAMUSCULAR; INTRAVENOUS; SUBCUTANEOUS at 04:07

## 2024-07-05 RX ADMIN — PROPOFOL 150 MG: 10 INJECTION, EMULSION INTRAVENOUS at 02:07

## 2024-07-05 RX ADMIN — OXYCODONE HYDROCHLORIDE 5 MG: 5 TABLET ORAL at 04:07

## 2024-07-05 RX ADMIN — KETOROLAC TROMETHAMINE 30 MG: 30 INJECTION, SOLUTION INTRAMUSCULAR; INTRAVENOUS at 03:07

## 2024-07-05 RX ADMIN — PHENYLEPHRINE HYDROCHLORIDE 100 MCG: 10 INJECTION INTRAVENOUS at 02:07

## 2024-07-05 RX ADMIN — HYPROMELLOSE 2910 2 DROP: 5 SOLUTION OPHTHALMIC at 02:07

## 2024-07-05 RX ADMIN — SODIUM CHLORIDE, POTASSIUM CHLORIDE, SODIUM LACTATE AND CALCIUM CHLORIDE: 600; 310; 30; 20 INJECTION, SOLUTION INTRAVENOUS at 08:07

## 2024-07-05 RX ADMIN — PREGABALIN 50 MG: 50 CAPSULE ORAL at 09:07

## 2024-07-05 RX ADMIN — PIPERACILLIN AND TAZOBACTAM 4.5 G: 4; .5 INJECTION, POWDER, LYOPHILIZED, FOR SOLUTION INTRAVENOUS; PARENTERAL at 05:07

## 2024-07-05 RX ADMIN — Medication 6 MG: at 09:07

## 2024-07-05 RX ADMIN — ROCURONIUM BROMIDE 50 MG: 10 INJECTION, SOLUTION INTRAVENOUS at 02:07

## 2024-07-05 RX ADMIN — ONDANSETRON 4 MG: 2 INJECTION, SOLUTION INTRAMUSCULAR; INTRAVENOUS at 03:07

## 2024-07-05 RX ADMIN — SODIUM CHLORIDE, SODIUM LACTATE, POTASSIUM CHLORIDE, AND CALCIUM CHLORIDE: .6; .31; .03; .02 INJECTION, SOLUTION INTRAVENOUS at 02:07

## 2024-07-05 RX ADMIN — Medication 25 MG: at 02:07

## 2024-07-05 RX ADMIN — PIPERACILLIN AND TAZOBACTAM 4.5 G: 4; .5 INJECTION, POWDER, LYOPHILIZED, FOR SOLUTION INTRAVENOUS; PARENTERAL at 11:07

## 2024-07-05 RX ADMIN — ACETAMINOPHEN 650 MG: 325 TABLET ORAL at 09:07

## 2024-07-05 RX ADMIN — ONDANSETRON 4 MG: 2 INJECTION, SOLUTION INTRAMUSCULAR; INTRAVENOUS at 02:07

## 2024-07-05 RX ADMIN — DEXTROSE, SODIUM CHLORIDE, AND POTASSIUM CHLORIDE: 5; .45; .15 INJECTION INTRAVENOUS at 12:07

## 2024-07-05 RX ADMIN — DEXAMETHASONE SODIUM PHOSPHATE 12 MG: 4 INJECTION, SOLUTION INTRA-ARTICULAR; INTRALESIONAL; INTRAMUSCULAR; INTRAVENOUS; SOFT TISSUE at 02:07

## 2024-07-05 RX ADMIN — CARVEDILOL 25 MG: 25 TABLET, FILM COATED ORAL at 09:07

## 2024-07-05 RX ADMIN — ATORVASTATIN CALCIUM 40 MG: 40 TABLET, FILM COATED ORAL at 09:07

## 2024-07-05 RX ADMIN — SODIUM CHLORIDE, POTASSIUM CHLORIDE, SODIUM LACTATE AND CALCIUM CHLORIDE: 600; 310; 30; 20 INJECTION, SOLUTION INTRAVENOUS at 05:07

## 2024-07-05 RX ADMIN — MUPIROCIN: 20 OINTMENT TOPICAL at 10:07

## 2024-07-05 RX ADMIN — FENTANYL CITRATE 100 MCG: 50 INJECTION INTRAMUSCULAR; INTRAVENOUS at 02:07

## 2024-07-05 NOTE — ANESTHESIA PREPROCEDURE EVALUATION
07/05/2024  Mauro Benitez is a 58 y.o., male.    Stress echo 6/3/24    Normal myocardial perfusion scan. There is no evidence of myocardial ischemia or infarction.    The gated perfusion images showed an ejection fraction of 56% at rest. The gated perfusion images showed an ejection fraction of 56% post stress. Normal ejection fraction is greater than 47%.    There is normal wall motion at rest and post stress.    LV cavity size is normal at rest and normal at stress.    The ECG portion of the study is negative for ischemia.    The patient reported no chest pain during the stress test.    There were no arrhythmias during stress.    There are no prior studies for comparison.      Pre-op Assessment    I have reviewed the Patient Summary Reports.     I have reviewed the Nursing Notes. I have reviewed the NPO Status.   I have reviewed the Medications.     Review of Systems  Anesthesia Hx:  No problems with previous Anesthesia                Social:  Smoker       Cardiovascular:     Hypertension                                        Pulmonary:        Sleep Apnea                Renal/:  Chronic Renal Disease                Hepatic/GI:     GERD             Neurological:    Neuromuscular Disease,                                   Endocrine:  Diabetes           Psych:  Psychiatric History                  Physical Exam  General: Alert and Cooperative    Airway:  Mallampati: III / II  Mouth Opening: Normal  TM Distance: Normal  Tongue: Normal  Neck ROM: Normal ROM    Dental:  Edentulous    Chest/Lungs:  Normal Respiratory Rate    Heart:  Rate: Normal        Anesthesia Plan  Type of Anesthesia, risks & benefits discussed:    Anesthesia Type: Gen ETT  Intra-op Monitoring Plan: Standard ASA Monitors  Post Op Pain Control Plan: multimodal analgesia  Induction:  IV  Airway Plan: Direct, Post-Induction  Informed Consent:  Informed consent signed with the Patient and all parties understand the risks and agree with anesthesia plan.  All questions answered.   ASA Score: 3  Day of Surgery Review of History & Physical: H&P Update referred to the surgeon/provider.    Ready For Surgery From Anesthesia Perspective.     .

## 2024-07-05 NOTE — OP NOTE
[facility] Surg  Operative Note    SUMMARY     Surgery Date: 7/5/2024     Surgeons and Role:     * Salazar Scott MD - Primary    Assisting Surgeon: Handy Roblero MD    Pre-op Diagnosis:  Acute appendicitis [K35.80]    Post-op Diagnosis:  Appendicitis, with gangrene and necrosis    Procedure(s) (LRB):  APPENDECTOMY, LAPAROSCOPIC (N/A)  Abscess washout  Drain placemen t    Anesthesia: General    Indication for procedure: Mauro Benitez is 58 y.o. male with 1 wk hx of abdominal pain and worsening symptoms leading to RLQ pain. CT reveals appendicitis. After discussion of disease process, we discussed options and have elected for operation to perform lap vs open appendectomy.    Description of Procedure: After consent was obtained, patient was taken to the OR. The abdomen was prepped and draped in a standard sterile fashion after general anesthesia was started. Time out was performed. Antibiotics were started with zosyn. We began the procedure by making a supraumbilical incision cutting down through the skin and sharply entering the abdominal cavity by cutting the fascia we placed a balloon trocar achieve pneumoperitoneum.  We placed the patient in Trendelenburg and right-side-up position we placed 5 mm trocars in left lower quadrant and suprapubic area.  We identified the appendix was very inflamed it was very stuck to the sidewall going down to the pelvis.  We used a combination of blunt sharp and cautery dissection  from the surrounding attachments.  During this portion of the procedure we grabbed the appendix and the wall was very friable and ruptured.  We used the harmonic scalpel took down the mesoappendix all the way towards the base of the appendix.  There was a small amount of oozing and bleeding however was easily controlled with the harmonic scalpel.  We then used a PDS endoloop slipped down to the base of the non inflamed normal-appearing base and did a ligation of the base of the appendix and  then used the harmonic scalpel to transect it.  We then removed the appendix with the Endo-Catch bag.  We used the suction  and cleared out all the pus.  There was some soft fragments appendicolith that were removed completely  We introduced a 10 flat KARTIK drain from the left lower quadrant and placed it in the pelvis a running just adjacent to the base of the appendix  We removed our trocars removed and pneumoperitoneum and then we closed the fascial closure site at the periumbilical location with 0 Ethibond interrupted sutures.  We then closed the skin in all sites with 4-0 Monocryl and used a nylon suture to secure the drain  We did irrigate all the subcutaneous spaces before closing the skin and covering it with sterile dressing    All counts were correct x2. Patient was awakened from anesthesia and taken to the recovery room in a stable condition having suffered no issues at this time.    Description of the findings of the procedure:   Seropurulent fluid in the pelvis consistent with periappendiceal abscess  The appendix was filled with stones and very friable however the base was normal and transected with a PDS endoloop  Hemostatic  10 flat KARTIK drain placed    Estimated Blood Loss: * No values recorded between 7/5/2024  2:52 PM and 7/5/2024  3:44 PM *         Specimens:   Specimen (24h ago, onward)       Start     Ordered    07/05/24 1503  Specimen to Pathology, Surgery General Surgery  Once        Comments: Pre-op Diagnosis: Acute appendicitis [K35.80]Procedure(s):APPENDECTOMY, LAPAROSCOPIC Number of specimens: 1Name of specimens: 1 - appendix - perm     References:    Click here for ordering Quick Tip   Question Answer Comment   Procedure Type: General Surgery    Release to patient Immediate        07/05/24 1502                    Drains/Implants:  10 flat KARTIK drain

## 2024-07-05 NOTE — H&P
[facility] Surg  General Surgery  History & Physical    Patient Name: Mauro Benitez  MRN: [id number]  Admission Date: 7/5/2024  Attending Physician: Salazar Scott MD   Primary Care Provider: Kosta Roberts MD    Patient information was obtained from patient, past medical records, and ER records.     Subjective:     Chief Complaint/Reason for Admission: Acute appendicitis    History of Present Illness: Mauro Benitez is a 58 y.o. male with medical problems including prostate cancer s/p robotic assisted radical prostatectomy in 2015 c/b recurrence s/p RT and hormonal ablation, HTN, HLD, CKD3, and T2DM who presented as a transfer for evaluation of acute appendicitis on imaging. Mr. Benitez states his symptoms began approximately 5 days ago with largely suprapubic pain and pain with urination. He initially attributed this to something wrong with his kidneys, however yesterday the pain was much worse and he had a few episodes of emesis overnight. He denies any fevers, chills, diarrhea or constipation. He denies chest pain or shortness of breath at this time. He is not on any blood thinners apart from a baby aspirin. He denies history of CVA, MI, or pulmonary disease. He is an active smoker, 2-3 Black and Milds per day. He works as a .     Current Facility-Administered Medications on File Prior to Encounter   Medication    [COMPLETED] iohexoL (OMNIPAQUE 350) injection 100 mL    [COMPLETED] ketorolac injection 15 mg    [COMPLETED] morphine injection 6 mg    [COMPLETED] ondansetron injection 4 mg    [COMPLETED] piperacillin-tazobactam (ZOSYN) 4.5 g in D5W 100 mL IVPB (MB+)    [COMPLETED] sodium chloride 0.9% bolus 1,000 mL 1,000 mL    [DISCONTINUED] 0.9%  NaCl infusion     Current Outpatient Medications on File Prior to Encounter   Medication Sig    abiraterone (ZYTIGA) 250 mg Tab Take 1 tablet (250 mg total) by mouth once daily Take as directed with a low fat meal.    acetaminophen (TYLENOL) 500 MG tablet Take 1,500  "mg by mouth daily as needed for Pain.    amitriptyline (ELAVIL) 25 MG tablet Take 25 mg by mouth every evening.    amLODIPine (NORVASC) 10 MG tablet Take 10 mg by mouth every evening.    aspirin (ECOTRIN) 81 MG EC tablet Aspir-81 mg tablet,delayed release   Take 1 tablet every day by oral route in the morning for 30 days.    atorvastatin (LIPITOR) 40 MG tablet atorvastatin 40 mg tablet   TAKE 1 TABLET BY MOUTH EVERY DAY AT BEDTIME    BD ULTRA-FINE XIAO PEN NEEDLE 32 gauge x 5/32" Ndle USE AS DIRECTED DAILY.    blood sugar diagnostic (ONETOUCH VERIO TEST STRIPS MISC) OneTouch Verio test strips    blood sugar diagnostic (ONETOUCH VERIO TEST STRIPS MISC) OneTouch Verio test strips   USE 1 TEST STRIP TO CHECK BLOOD SUGAR TWICE DAILY    blood sugar diagnostic Strp Accu-Chek Odalis Plus test strips   Take 1 strip twice a day by miscell. route.    carvediloL (COREG) 25 MG tablet Take 1 tablet (25 mg total) by mouth 2 (two) times daily.    cholecalciferol, vitamin D3, (VITAMIN D3) 25 mcg (1,000 unit) capsule Take 1 capsule (1,000 Units total) by mouth once daily.    fluticasone propionate (FLONASE) 50 mcg/actuation nasal spray fluticasone propionate 50 mcg/actuation nasal spray,suspension   SPRAY 2 SPRAYS EVERY DAY BY INTRANASAL ROUTE AS NEEDED.    insulin glargine 100 units/mL SubQ pen Inject 15 Units into the skin every evening.    losartan (COZAAR) 100 MG tablet Take 100 mg by mouth once daily.    meclizine (ANTIVERT) 25 mg tablet Take 1 tablet (25 mg total) by mouth 3 (three) times daily as needed (dizziness).    metFORMIN (GLUCOPHAGE) 1000 MG tablet metformin 1,000 mg tablet   TAKE 1 TABLET BY MOUTH TWICE DAILY    mirabegron (MYRBETRIQ) 25 mg Tb24 ER tablet Take 1 tablet (25 mg total) by mouth once daily.    omeprazole (PRILOSEC) 20 MG capsule Take 20 mg by mouth once daily.    ondansetron (ZOFRAN-ODT) 4 MG TbDL Take 1 tablet (4 mg total) by mouth every 6 (six) hours as needed (nausea).    predniSONE (DELTASONE) 5 MG " tablet Take 1 tablet (5 mg total) by mouth once daily. Take as directed with water on an empty stomach.    pregabalin (LYRICA) 50 MG capsule Take 1 capsule (50 mg total) by mouth 3 (three) times daily.       Review of patient's allergies indicates:   Allergen Reactions    Lisinopril Shortness Of Breath       Past Medical History:   Diagnosis Date    Aortic atherosclerosis 04/18/2024    Diabetes mellitus     GERD (gastroesophageal reflux disease)     Hypertension     Prostate cancer     Sleep apnea      Past Surgical History:   Procedure Laterality Date    ESOPHAGOGASTRODUODENOSCOPY N/A 12/5/2022    Procedure: EGD (ESOPHAGOGASTRODUODENOSCOPY);  Surgeon: Beronica Valera MD;  Location: [facility];  Service: Endoscopy;  Laterality: N/A;    PROSTATECTOMY       Family History       Problem Relation (Age of Onset)    Cancer Father    Dementia Mother    Heart disease Mother    Hypertension Mother, Brother    Prostate cancer Father          Tobacco Use    Smoking status: Every Day     Types: Cigars    Smokeless tobacco: Never    Tobacco comments:     2-3 cigars/day   Substance and Sexual Activity    Alcohol use: Yes     Comment: Occaisionally throughout the week    Drug use: Yes     Types: Marijuana     Comment: Remote crack cocaine use. No IVDU. Daily marijuana use.    Sexual activity: Yes     Partners: Female     Review of Systems   Constitutional:  Negative for chills and fever.   Respiratory:  Negative for cough and shortness of breath.    Cardiovascular:  Negative for chest pain.   Gastrointestinal:  Positive for abdominal pain, nausea and vomiting. Negative for blood in stool, constipation and diarrhea.   Genitourinary:  Positive for difficulty urinating and dysuria.   Neurological:  Negative for seizures and syncope.   All other systems reviewed and are negative.    Objective:     Vital Signs (Most Recent):  Temp: 98.7 °F (37.1 °C) (07/05/24 1030)  Pulse: 107 (07/05/24 1030)  Resp: 19 (07/05/24 1203)  BP: 124/79  (07/05/24 1030)  SpO2: 99 % (07/05/24 1030) Vital Signs (24h Range):  Temp:  [98 °F (36.7 °C)-98.7 °F (37.1 °C)] 98.7 °F (37.1 °C)  Pulse:  [] 107  Resp:  [16-22] 19  SpO2:  [97 %-99 %] 99 %  BP: (124-198)/() 124/79     Weight: 90.5 kg (199 lb 8.3 oz)  Body mass index is 28.63 kg/m².     Physical Exam  Vitals and nursing note reviewed.   Constitutional:       General: He is in acute distress.      Appearance: He is not ill-appearing or toxic-appearing.   HENT:      Head: Normocephalic and atraumatic.   Eyes:      General: No scleral icterus.     Extraocular Movements: Extraocular movements intact.   Cardiovascular:      Rate and Rhythm: Normal rate and regular rhythm.   Pulmonary:      Effort: Pulmonary effort is normal. No respiratory distress.   Abdominal:      General: There is no distension.      Palpations: Abdomen is soft.      Tenderness: There is abdominal tenderness (TTP in RLQ and Suprapubic region). There is no guarding or rebound.   Skin:     General: Skin is warm and dry.   Neurological:      General: No focal deficit present.      Mental Status: He is alert and oriented to person, place, and time.            I have reviewed all pertinent lab results within the past 24 hours.    Significant Diagnostics:  I have reviewed all pertinent imaging results/findings within the past 24 hours.    Assessment/Plan:     * Acute appendicitis  Mauro Benitez is a 58 y.o. male with medical problems including prostate ca s/p robotic assisted radical prostatectomy c/b recurrence s/p RT and hormonal therapy, T2DM, and CKD3 who presents with 5 day history of lower abdominal pain and imaging concerning for acute appendicitis. I recommended proceeding with appendectomy and following discussion of R/B/A, the patient is in agreement with plan. Consent was signed at bedside.     To OR today for laparoscopic appendectomy  NPO, mIVF  Zosyn  MMPC  PRN anti-emetics  SCDs        VTE Risk Mitigation (From admission,  onward)           Ordered     IP VTE HIGH RISK PATIENT  Once         07/05/24 1152     Place sequential compression device  Until discontinued         07/05/24 1152                    Handy Roblero MD  General Surgery  [facility]

## 2024-07-05 NOTE — SUBJECTIVE & OBJECTIVE
"Current Facility-Administered Medications on File Prior to Encounter   Medication    [COMPLETED] iohexoL (OMNIPAQUE 350) injection 100 mL    [COMPLETED] ketorolac injection 15 mg    [COMPLETED] morphine injection 6 mg    [COMPLETED] ondansetron injection 4 mg    [COMPLETED] piperacillin-tazobactam (ZOSYN) 4.5 g in D5W 100 mL IVPB (MB+)    [COMPLETED] sodium chloride 0.9% bolus 1,000 mL 1,000 mL    [DISCONTINUED] 0.9%  NaCl infusion     Current Outpatient Medications on File Prior to Encounter   Medication Sig    abiraterone (ZYTIGA) 250 mg Tab Take 1 tablet (250 mg total) by mouth once daily Take as directed with a low fat meal.    acetaminophen (TYLENOL) 500 MG tablet Take 1,500 mg by mouth daily as needed for Pain.    amitriptyline (ELAVIL) 25 MG tablet Take 25 mg by mouth every evening.    amLODIPine (NORVASC) 10 MG tablet Take 10 mg by mouth every evening.    aspirin (ECOTRIN) 81 MG EC tablet Aspir-81 mg tablet,delayed release   Take 1 tablet every day by oral route in the morning for 30 days.    atorvastatin (LIPITOR) 40 MG tablet atorvastatin 40 mg tablet   TAKE 1 TABLET BY MOUTH EVERY DAY AT BEDTIME    BD ULTRA-FINE XIAO PEN NEEDLE 32 gauge x 5/32" Ndle USE AS DIRECTED DAILY.    blood sugar diagnostic (ONETOUCH VERIO TEST STRIPS MISC) OneTouch Verio test strips    blood sugar diagnostic (ONETOUCH VERIO TEST STRIPS MISC) OneTouch Verio test strips   USE 1 TEST STRIP TO CHECK BLOOD SUGAR TWICE DAILY    blood sugar diagnostic Strp Accu-Chek Odalis Plus test strips   Take 1 strip twice a day by miscell. route.    carvediloL (COREG) 25 MG tablet Take 1 tablet (25 mg total) by mouth 2 (two) times daily.    cholecalciferol, vitamin D3, (VITAMIN D3) 25 mcg (1,000 unit) capsule Take 1 capsule (1,000 Units total) by mouth once daily.    fluticasone propionate (FLONASE) 50 mcg/actuation nasal spray fluticasone propionate 50 mcg/actuation nasal spray,suspension   SPRAY 2 SPRAYS EVERY DAY BY INTRANASAL ROUTE AS NEEDED.    " insulin glargine 100 units/mL SubQ pen Inject 15 Units into the skin every evening.    losartan (COZAAR) 100 MG tablet Take 100 mg by mouth once daily.    meclizine (ANTIVERT) 25 mg tablet Take 1 tablet (25 mg total) by mouth 3 (three) times daily as needed (dizziness).    metFORMIN (GLUCOPHAGE) 1000 MG tablet metformin 1,000 mg tablet   TAKE 1 TABLET BY MOUTH TWICE DAILY    mirabegron (MYRBETRIQ) 25 mg Tb24 ER tablet Take 1 tablet (25 mg total) by mouth once daily.    omeprazole (PRILOSEC) 20 MG capsule Take 20 mg by mouth once daily.    ondansetron (ZOFRAN-ODT) 4 MG TbDL Take 1 tablet (4 mg total) by mouth every 6 (six) hours as needed (nausea).    predniSONE (DELTASONE) 5 MG tablet Take 1 tablet (5 mg total) by mouth once daily. Take as directed with water on an empty stomach.    pregabalin (LYRICA) 50 MG capsule Take 1 capsule (50 mg total) by mouth 3 (three) times daily.       Review of patient's allergies indicates:   Allergen Reactions    Lisinopril Shortness Of Breath       Past Medical History:   Diagnosis Date    Aortic atherosclerosis 04/18/2024    Diabetes mellitus     GERD (gastroesophageal reflux disease)     Hypertension     Prostate cancer     Sleep apnea      Past Surgical History:   Procedure Laterality Date    ESOPHAGOGASTRODUODENOSCOPY N/A 12/5/2022    Procedure: EGD (ESOPHAGOGASTRODUODENOSCOPY);  Surgeon: Beronica Valera MD;  Location: [facility];  Service: Endoscopy;  Laterality: N/A;    PROSTATECTOMY       Family History       Problem Relation (Age of Onset)    Cancer Father    Dementia Mother    Heart disease Mother    Hypertension Mother, Brother    Prostate cancer Father          Tobacco Use    Smoking status: Every Day     Types: Cigars    Smokeless tobacco: Never    Tobacco comments:     2-3 cigars/day   Substance and Sexual Activity    Alcohol use: Yes     Comment: Occaisionally throughout the week    Drug use: Yes     Types: Marijuana     Comment: Remote crack cocaine use. No IVDU.  Daily marijuana use.    Sexual activity: Yes     Partners: Female     Review of Systems   Constitutional:  Negative for chills and fever.   Respiratory:  Negative for cough and shortness of breath.    Cardiovascular:  Negative for chest pain.   Gastrointestinal:  Positive for abdominal pain, nausea and vomiting. Negative for blood in stool, constipation and diarrhea.   Genitourinary:  Positive for difficulty urinating and dysuria.   Neurological:  Negative for seizures and syncope.   All other systems reviewed and are negative.    Objective:     Vital Signs (Most Recent):  Temp: 98.7 °F (37.1 °C) (07/05/24 1030)  Pulse: 107 (07/05/24 1030)  Resp: 19 (07/05/24 1203)  BP: 124/79 (07/05/24 1030)  SpO2: 99 % (07/05/24 1030) Vital Signs (24h Range):  Temp:  [98 °F (36.7 °C)-98.7 °F (37.1 °C)] 98.7 °F (37.1 °C)  Pulse:  [] 107  Resp:  [16-22] 19  SpO2:  [97 %-99 %] 99 %  BP: (124-198)/() 124/79     Weight: 90.5 kg (199 lb 8.3 oz)  Body mass index is 28.63 kg/m².     Physical Exam  Vitals and nursing note reviewed.   Constitutional:       General: He is in acute distress.      Appearance: He is not ill-appearing or toxic-appearing.   HENT:      Head: Normocephalic and atraumatic.   Eyes:      General: No scleral icterus.     Extraocular Movements: Extraocular movements intact.   Cardiovascular:      Rate and Rhythm: Normal rate and regular rhythm.   Pulmonary:      Effort: Pulmonary effort is normal. No respiratory distress.   Abdominal:      General: There is no distension.      Palpations: Abdomen is soft.      Tenderness: There is abdominal tenderness (TTP in RLQ and Suprapubic region). There is no guarding or rebound.   Skin:     General: Skin is warm and dry.   Neurological:      General: No focal deficit present.      Mental Status: He is alert and oriented to person, place, and time.            I have reviewed all pertinent lab results within the past 24 hours.    Significant Diagnostics:  I have  reviewed all pertinent imaging results/findings within the past 24 hours.

## 2024-07-05 NOTE — HPI
Mauro Benitez is a 58 y.o. male with medical problems including prostate cancer s/p robotic assisted radical prostatectomy in 2015 c/b recurrence s/p RT and hormonal ablation, HTN, HLD, CKD3, and T2DM who presented as a transfer for evaluation of acute appendicitis on imaging. Mr. Benitez states his symptoms began approximately 5 days ago with largely suprapubic pain and pain with urination. He initially attributed this to something wrong with his kidneys, however yesterday the pain was much worse and he had a few episodes of emesis overnight. He denies any fevers, chills, diarrhea or constipation. He denies chest pain or shortness of breath at this time. He is not on any blood thinners apart from a baby aspirin. He denies history of CVA, MI, or pulmonary disease. He is an active smoker, 2-3 Black and Milds per day. He works as a .

## 2024-07-05 NOTE — ANESTHESIA POSTPROCEDURE EVALUATION
Anesthesia Post Evaluation    Patient: Mauro Benitez    Procedure(s) Performed: Procedure(s) (LRB):  APPENDECTOMY, LAPAROSCOPIC (N/A)    Final Anesthesia Type: general      Patient location during evaluation: PACU  Patient participation: Yes- Able to Participate  Level of consciousness: awake and alert  Post-procedure vital signs: reviewed and stable  Pain management: adequate  Airway patency: patent  VENKATESH mitigation strategies: Multimodal analgesia  PONV status at discharge: No PONV  Anesthetic complications: no      Cardiovascular status: hemodynamically stable  Respiratory status: spontaneous ventilation and room air  Hydration status: euvolemic  Follow-up not needed.              Vitals Value Taken Time   /89 07/05/24 1700   Temp 36.6 °C (97.8 °F) 07/05/24 1700   Pulse 96 07/05/24 1700   Resp 20 07/05/24 1700   SpO2 98 % 07/05/24 1700         Event Time   Out of Recovery 16:47:58         Pain/Boris Score: Pain Rating Prior to Med Admin: 7 (7/5/2024  4:19 PM)  Pain Rating Post Med Admin: 0 (7/5/2024  5:14 PM)

## 2024-07-05 NOTE — LETTER
July 8, 2024    Mauro Benitez  [address] [address]                   [address]  [place] [address]  Phone: [phone]  Fax: [phone]   July 8, 2024     Patient: Mauro Benitez   YOB: 1965   Date of Visit: 7/5/2024       To Whom it May Concern:    Mauro Benitez was seen in the hospital and underwent surgery on 7/5/2024. He will need to be excused from work for the next 2 weeks due to lifting restrictions.    Please excuse him from any work missed.    If you have any questions or concerns, please don't hesitate to call.    Sincerely,         Trav Mosqueda MD

## 2024-07-05 NOTE — NURSING
Pt up to bedside; VS taken and recorded. Spouse at bedside. Patient and spouse oriented to room. Call light and personal items within reach; advised pt to call for assistance, pt verbalized understanding.

## 2024-07-05 NOTE — TRANSFER OF CARE
"Anesthesia Transfer of Care Note    Patient: Mauro Benitez    Procedure(s) Performed: Procedure(s) (LRB):  APPENDECTOMY, LAPAROSCOPIC (N/A)    Patient location: PACU    Anesthesia Type: general    Transport from OR: Transported from OR on 6-10 L/min O2 by face mask with adequate spontaneous ventilation    Post pain: adequate analgesia    Post assessment: no apparent anesthetic complications and tolerated procedure well    Post vital signs: stable    Level of consciousness: sedated    Nausea/Vomiting: no nausea/vomiting    Complications: none    Transfer of care protocol was followed      Last vitals: Visit Vitals  /79 (BP Location: Left arm, Patient Position: Lying)   Pulse 107   Temp 37.1 °C (98.7 °F) (Oral)   Resp 19   Ht 5' 10" (1.778 m)   Wt 90.5 kg (199 lb 8.3 oz)   SpO2 99%   BMI 28.63 kg/m²     "

## 2024-07-05 NOTE — PROGRESS NOTES
VIRTUAL NURSE:  Cued into patient's room.  Permission received per patient to turn camera to view patient.  Introduced as VN that will be working with floor nurse and nursing assistant.  Educated patient on VN's role in patient care and  VIP model.  Plan of care reviewed with patient.  Education per flowsheet.   Informed patient that staff will round on them every 2 hours but to use call light for any other needs they may have; informed of fall risk and fall precautions.  Patient verbalized understanding.  Call light within reach; bed siderails up x3.  Opportunity given for questions and questions answered.  Admission assessment questions answered.  Patient denies complaints or any needs at this time. Instructed to call for assistance.  Will cont to monitor and intervene as needed.    Labs, notes, orders, and careplan reviewed.      07/05/24 1137   Admission   Initial VN Admission Questions Complete   Communication Issues? None   Shift   Virtual Nurse - Rounding Complete   Pain Management Interventions quiet environment facilitated;relaxation techniques promoted   Virtual Nurse - Patient Verbalized Approval Of Camera Use;VN Rounding   Type of Frequent Check   Type Patient Rounds   Safety/Activity   Patient Rounds visualized patient;bed in low position;placement of personal items at bedside;bed wheels locked;call light in patient/parent reach;ID band on;clutter free environment maintained

## 2024-07-05 NOTE — ANESTHESIA PROCEDURE NOTES
Intubation    Date/Time: 7/5/2024 2:28 PM    Performed by: Luke Clifford CRNA  Authorized by: Narayan Camilo MD    Intubation:     Induction:  Intravenous    Intubated:  Postinduction    Mask Ventilation:  Easy mask    Attempts:  1    Attempted By:  CRNA    Method of Intubation:  Video laryngoscopy    Blade:  Escobar 3    Laryngeal View Grade: Grade III - only epiglottis visible      Difficult Airway Encountered?: No      Chest x-ray findings: subglottic mass noted. unable to visualize cords.    Airway Device:  Oral endotracheal tube    Airway Device Size:  7.5    Style/Cuff Inflation:  Cuffed (inflated to minimal occlusive pressure)    Inflation Amount (mL):  8    Tube secured:  21    Secured at:  The lips    Placement Verified By:  Capnometry    Complicating Factors:  None    Findings Post-Intubation:  BS equal bilateral and atraumatic/condition of teeth unchanged  Notes:      Roughly 1cm x 1cm (difficult to estimate on video laryngoscopy) soft tissue mass noted below the epiglottis obstructing view of vocal cords. ETT placed below the mass to where vocal cords projected to be with positive EtCO2 and breath sounds bilaterally. Unable to find airway notes from robotic prostatectomy performed 2015

## 2024-07-05 NOTE — ANESTHESIA PROCEDURE NOTES
Intubation    Date/Time: 7/5/2024 2:28 PM    Performed by: Luke Clifford CRNA  Authorized by: Narayan Camilo MD    Intubation:     Induction:  Intravenous    Intubated:  Postinduction    Mask Ventilation:  Easy mask    Attempts:  1    Attempted By:  CRNA    Method of Intubation:  Video laryngoscopy    Blade:  Escobar 3    Laryngeal View Grade: Grade III - only epiglottis visible      Laryngeal View Grade comment:  Lesion/mass noted below epiglottis approx  nickel size..    Difficult Airway Encountered?: No      Complications:  None    Airway Device:  Oral endotracheal tube    Airway Device Size:  7.5    Style/Cuff Inflation:  Cuffed (inflated to minimal occlusive pressure)    Tube secured:  23    Secured at:  The lips    Placement Verified By:  Capnometry    Complicating Factors:  Large/floppy epiglottis (lesion/mass noted below epiglottis approx nickel size..)    Findings Post-Intubation:  BS equal bilateral and atraumatic/condition of teeth unchanged  Notes:      lesion/mass noted below epiglottis approx nickel size..M[place] and Surgeon at bedside.

## 2024-07-05 NOTE — NURSING TRANSFER
Nursing Transfer Note      7/5/2024   4:43 PM    Nurse giving handoff:Torin  Nurse receiving handoff:Lara    Patient in stable condition, aaox4, vss, lap sites intact with no drainage, maria luz drain intact draining serosanguinous drainage.  Pain 2/10

## 2024-07-05 NOTE — PLAN OF CARE
AAOx4. Minimal c/o abdominal pain post op. Tolerating clear liquid diet. IVF and IV ABX given per MAR. 3x lap sites w/dermabond intact. LLQ KARTIK drain intact. Blood glucose monitoring maintained. Bed locked in lowest position, bed alarm set and call bell within reach.

## 2024-07-05 NOTE — ASSESSMENT & PLAN NOTE
Mauro Benitez is a 58 y.o. male with medical problems including prostate ca s/p robotic assisted radical prostatectomy c/b recurrence s/p RT and hormonal therapy, T2DM, and CKD3 who presents with 5 day history of lower abdominal pain and imaging concerning for acute appendicitis. I recommended proceeding with appendectomy and following discussion of R/B/A, the patient is in agreement with plan. Consent was signed at bedside.     To OR today for laparoscopic appendectomy  NPO, mIVF  Zosyn  MMPC  PRN anti-emetics  SCDs

## 2024-07-06 LAB
ALBUMIN SERPL BCP-MCNC: 3.1 G/DL (ref 3.5–5.2)
ANION GAP SERPL CALC-SCNC: 13 MMOL/L (ref 8–16)
BASOPHILS # BLD AUTO: 0.01 K/UL (ref 0–0.2)
BASOPHILS NFR BLD: 0.1 % (ref 0–1.9)
BUN SERPL-MCNC: 18 MG/DL (ref 6–20)
CALCIUM SERPL-MCNC: 9.4 MG/DL (ref 8.7–10.5)
CHLORIDE SERPL-SCNC: 102 MMOL/L (ref 95–110)
CO2 SERPL-SCNC: 21 MMOL/L (ref 23–29)
CREAT SERPL-MCNC: 1.2 MG/DL (ref 0.5–1.4)
DIFFERENTIAL METHOD BLD: ABNORMAL
EOSINOPHIL # BLD AUTO: 0 K/UL (ref 0–0.5)
EOSINOPHIL NFR BLD: 0 % (ref 0–8)
ERYTHROCYTE [DISTWIDTH] IN BLOOD BY AUTOMATED COUNT: 14.9 % (ref 11.5–14.5)
EST. GFR  (NO RACE VARIABLE): >60 ML/MIN/1.73 M^2
ESTIMATED AVG GLUCOSE: 108 MG/DL (ref 68–131)
GLUCOSE SERPL-MCNC: 214 MG/DL (ref 70–110)
HBA1C MFR BLD: 5.4 % (ref 4–5.6)
HCT VFR BLD AUTO: 29.9 % (ref 40–54)
HGB BLD-MCNC: 9.3 G/DL (ref 14–18)
IMM GRANULOCYTES # BLD AUTO: 0.05 K/UL (ref 0–0.04)
IMM GRANULOCYTES NFR BLD AUTO: 0.4 % (ref 0–0.5)
LYMPHOCYTES # BLD AUTO: 0.3 K/UL (ref 1–4.8)
LYMPHOCYTES NFR BLD: 2.4 % (ref 18–48)
MAGNESIUM SERPL-MCNC: 1.3 MG/DL (ref 1.6–2.6)
MCH RBC QN AUTO: 29.8 PG (ref 27–31)
MCHC RBC AUTO-ENTMCNC: 31.1 G/DL (ref 32–36)
MCV RBC AUTO: 96 FL (ref 82–98)
MONOCYTES # BLD AUTO: 0.5 K/UL (ref 0.3–1)
MONOCYTES NFR BLD: 3.7 % (ref 4–15)
NEUTROPHILS # BLD AUTO: 12.6 K/UL (ref 1.8–7.7)
NEUTROPHILS NFR BLD: 93.4 % (ref 38–73)
NRBC BLD-RTO: 0 /100 WBC
PHOSPHATE SERPL-MCNC: 3.8 MG/DL (ref 2.7–4.5)
PLATELET # BLD AUTO: 318 K/UL (ref 150–450)
PMV BLD AUTO: 9.9 FL (ref 9.2–12.9)
POCT GLUCOSE: 134 MG/DL (ref 70–110)
POCT GLUCOSE: 154 MG/DL (ref 70–110)
POCT GLUCOSE: 174 MG/DL (ref 70–110)
POCT GLUCOSE: 209 MG/DL (ref 70–110)
POTASSIUM SERPL-SCNC: 5 MMOL/L (ref 3.5–5.1)
RBC # BLD AUTO: 3.12 M/UL (ref 4.6–6.2)
SODIUM SERPL-SCNC: 136 MMOL/L (ref 136–145)
WBC # BLD AUTO: 13.47 K/UL (ref 3.9–12.7)

## 2024-07-06 PROCEDURE — 11000001 HC ACUTE MED/SURG PRIVATE ROOM

## 2024-07-06 PROCEDURE — 80069 RENAL FUNCTION PANEL: CPT

## 2024-07-06 PROCEDURE — 25000003 PHARM REV CODE 250

## 2024-07-06 PROCEDURE — 63600175 PHARM REV CODE 636 W HCPCS

## 2024-07-06 PROCEDURE — 85025 COMPLETE CBC W/AUTO DIFF WBC: CPT

## 2024-07-06 PROCEDURE — 83735 ASSAY OF MAGNESIUM: CPT

## 2024-07-06 PROCEDURE — 83036 HEMOGLOBIN GLYCOSYLATED A1C: CPT

## 2024-07-06 PROCEDURE — 36415 COLL VENOUS BLD VENIPUNCTURE: CPT

## 2024-07-06 RX ORDER — MAGNESIUM SULFATE HEPTAHYDRATE 40 MG/ML
2 INJECTION, SOLUTION INTRAVENOUS ONCE
Status: COMPLETED | OUTPATIENT
Start: 2024-07-06 | End: 2024-07-06

## 2024-07-06 RX ADMIN — IBUPROFEN 400 MG: 400 TABLET ORAL at 08:07

## 2024-07-06 RX ADMIN — ACETAMINOPHEN 650 MG: 325 TABLET ORAL at 05:07

## 2024-07-06 RX ADMIN — PIPERACILLIN AND TAZOBACTAM 4.5 G: 4; .5 INJECTION, POWDER, LYOPHILIZED, FOR SOLUTION INTRAVENOUS; PARENTERAL at 08:07

## 2024-07-06 RX ADMIN — SODIUM CHLORIDE, POTASSIUM CHLORIDE, SODIUM LACTATE AND CALCIUM CHLORIDE: 600; 310; 30; 20 INJECTION, SOLUTION INTRAVENOUS at 05:07

## 2024-07-06 RX ADMIN — ASPIRIN 81 MG: 81 TABLET, COATED ORAL at 08:07

## 2024-07-06 RX ADMIN — OXYCODONE 5 MG: 5 TABLET ORAL at 03:07

## 2024-07-06 RX ADMIN — INSULIN ASPART 2 UNITS: 100 INJECTION, SOLUTION INTRAVENOUS; SUBCUTANEOUS at 06:07

## 2024-07-06 RX ADMIN — PREGABALIN 50 MG: 50 CAPSULE ORAL at 08:07

## 2024-07-06 RX ADMIN — ATORVASTATIN CALCIUM 40 MG: 40 TABLET, FILM COATED ORAL at 08:07

## 2024-07-06 RX ADMIN — OXYCODONE 5 MG: 5 TABLET ORAL at 08:07

## 2024-07-06 RX ADMIN — MAGNESIUM SULFATE HEPTAHYDRATE 2 G: 40 INJECTION, SOLUTION INTRAVENOUS at 08:07

## 2024-07-06 RX ADMIN — CARVEDILOL 25 MG: 25 TABLET, FILM COATED ORAL at 08:07

## 2024-07-06 RX ADMIN — ACETAMINOPHEN 650 MG: 325 TABLET ORAL at 10:07

## 2024-07-06 RX ADMIN — MUPIROCIN: 20 OINTMENT TOPICAL at 08:07

## 2024-07-06 RX ADMIN — PREGABALIN 50 MG: 50 CAPSULE ORAL at 02:07

## 2024-07-06 RX ADMIN — PIPERACILLIN AND TAZOBACTAM 4.5 G: 4; .5 INJECTION, POWDER, LYOPHILIZED, FOR SOLUTION INTRAVENOUS; PARENTERAL at 04:07

## 2024-07-06 RX ADMIN — IBUPROFEN 400 MG: 400 TABLET ORAL at 02:07

## 2024-07-06 RX ADMIN — PIPERACILLIN AND TAZOBACTAM 4.5 G: 4; .5 INJECTION, POWDER, LYOPHILIZED, FOR SOLUTION INTRAVENOUS; PARENTERAL at 11:07

## 2024-07-06 RX ADMIN — ACETAMINOPHEN 650 MG: 325 TABLET ORAL at 02:07

## 2024-07-06 NOTE — PROGRESS NOTES
[facility] Surg  General Surgery  Progress Note    Subjective:     History of Present Illness:  Mauro Benitez is a 58 y.o. male with medical problems including prostate cancer s/p robotic assisted radical prostatectomy in 2015 c/b recurrence s/p RT and hormonal ablation, HTN, HLD, CKD3, and T2DM who presented as a transfer for evaluation of acute appendicitis on imaging. Mr. Benitez states his symptoms began approximately 5 days ago with largely suprapubic pain and pain with urination. He initially attributed this to something wrong with his kidneys, however yesterday the pain was much worse and he had a few episodes of emesis overnight. He denies any fevers, chills, diarrhea or constipation. He denies chest pain or shortness of breath at this time. He is not on any blood thinners apart from a baby aspirin. He denies history of CVA, MI, or pulmonary disease. He is an active smoker, 2-3 Black and Milds per day. He works as a .     Post-Op Info:  Procedure(s) (LRB):  APPENDECTOMY, LAPAROSCOPIC (N/A)   1 Day Post-Op     Interval History: IV fell out overnight and missed one dose of zosyn. Otherwise NAEON. Passing gas but will hiccups and belching. No n/v and tolerating cld. Drain with 100ml output of seropurlent fluid.    Medications:  Continuous Infusions:  Scheduled Meds:   acetaminophen  650 mg Oral Q8H    aspirin  81 mg Oral Daily    atorvastatin  40 mg Oral QHS    carvediloL  25 mg Oral BID    ibuprofen  400 mg Oral Q6H WAKE    magnesium sulfate in water  2 g Intravenous Once    mupirocin   Nasal BID    piperacillin-tazobactam (Zosyn) IV (PEDS and ADULTS) (extended infusion is not appropriate)  4.5 g Intravenous Q8H    pregabalin  50 mg Oral TID     PRN Meds:  Current Facility-Administered Medications:     0.9% NaCl, , Intravenous, PRN    acetaminophen, 650 mg, Oral, Q8H PRN    D10W, 12.5 g, Intravenous, PRN    glucagon (human recombinant), 1 mg, Intramuscular, PRN    glucagon (human recombinant), 1 mg,  Intramuscular, PRN    hydrALAZINE, 10 mg, Intravenous, Q6H PRN    insulin aspart U-100, 0-5 Units, Subcutaneous, Q6H PRN    labetalol, 10 mg, Intravenous, Q6H PRN    melatonin, 6 mg, Oral, Nightly PRN    ondansetron, 8 mg, Oral, Q8H PRN    oxyCODONE, 5 mg, Oral, Q4H PRN    sodium chloride 0.9%, 10 mL, Intravenous, PRN     Review of patient's allergies indicates:   Allergen Reactions    Lisinopril Shortness Of Breath     Objective:     Vital Signs (Most Recent):  Temp: 98 °F (36.7 °C) (07/06/24 0335)  Pulse: 83 (07/06/24 0727)  Resp: 18 (07/06/24 0843)  BP: 136/87 (07/06/24 0727)  SpO2: 95 % (07/06/24 0727) Vital Signs (24h Range):  Temp:  [97.7 °F (36.5 °C)-98.7 °F (37.1 °C)] 98 °F (36.7 °C)  Pulse:  [] 83  Resp:  [16-21] 18  SpO2:  [94 %-100 %] 95 %  BP: (120-153)/(72-91) 136/87     Weight: 88.1 kg (194 lb 3.6 oz)  Body mass index is 27.87 kg/m².    Intake/Output - Last 3 Shifts         07/04 0700  07/05 0659 07/05 0700  07/06 0659 07/06 0700  07/07 0659    P.O.  300     I.V. (mL/kg)  1262.9 (14.3)     IV Piggyback  1674.9     Total Intake(mL/kg)  3237.8 (36.8)     Urine (mL/kg/hr)  975     Drains  105     Total Output  1080     Net  +2157.8                     Physical Exam  Vitals and nursing note reviewed.   Constitutional:       General: He is not in acute distress.     Appearance: He is not ill-appearing.   HENT:      Head: Normocephalic and atraumatic.   Eyes:      General: No scleral icterus.     Extraocular Movements: Extraocular movements intact.   Cardiovascular:      Rate and Rhythm: Normal rate and regular rhythm.   Pulmonary:      Effort: Pulmonary effort is normal. No respiratory distress.   Abdominal:      General: There is distension (mild).      Palpations: Abdomen is soft.      Tenderness: There is abdominal tenderness (appropriately TTP). There is no guarding or rebound.      Comments: Incisions cdi  KARTIK drain with thin seropurlent output   Skin:     General: Skin is warm and dry.    Neurological:      General: No focal deficit present.      Mental Status: He is alert and oriented to person, place, and time.          Significant Labs:  I have reviewed all pertinent lab results within the past 24 hours.  CBC:   Recent Labs   Lab 07/06/24  0516   WBC 13.47*   RBC 3.12*   HGB 9.3*   HCT 29.9*      MCV 96   MCH 29.8   MCHC 31.1*     CMP:   Recent Labs   Lab 07/05/24  0539 07/06/24  0516   * 214*   CALCIUM 10.3 9.4   ALBUMIN 3.8 3.1*   PROT 8.2  --     136   K 4.4 5.0   CO2 23 21*    102   BUN 20 18   CREATININE 1.0 1.2   ALKPHOS 105  --    ALT 17  --    AST 14  --    BILITOT 0.6  --        Significant Diagnostics:  I have reviewed all pertinent imaging results/findings within the past 24 hours.  Assessment/Plan:     * Acute appendicitis  Mauro Benitez is a 58 y.o. male with medical problems including prostate ca s/p robotic assisted radical prostatectomy c/b recurrence s/p RT and hormonal therapy, T2DM, and CKD3 who presents with 5 day history of lower abdominal pain and imaging concerning for acute appendicitis. S/p lap appendectomy with abscess washout and drain placement on 7/5. Anesthesia noting nickel sized subepiglottic lesion on induction, recommending outpatient ENT eval.    CLD, dc mIVF  Zosyn x3-4 days  Drain care  MMPC  PRN anti-emetics  SCDs  Outpatient consult to ENT placed  Dispo: continue inpatient care          Trav Mosqueda MD  General Surgery  [facility] Surg

## 2024-07-06 NOTE — PLAN OF CARE
Problem: Adult Inpatient Plan of Care  Goal: Optimal Comfort and Wellbeing  Outcome: Progressing  Goal: Readiness for Transition of Care  Outcome: Progressing     Problem: Diabetes Comorbidity  Goal: Blood Glucose Level Within Targeted Range  7/6/2024 0631 by Afshan Mabry RN  Outcome: Progressing  7/6/2024 0626 by Afshan Mabry RN  Outcome: Progressing     Problem: Pain Acute  Goal: Optimal Pain Control and Function  7/6/2024 0631 by Afshan Mabry RN  Outcome: Progressing  7/6/2024 0626 by Afshan Mabry, RN  Outcome: Progressing

## 2024-07-06 NOTE — SUBJECTIVE & OBJECTIVE
Interval History: IV fell out overnight and missed one dose of zosyn. Otherwise NAEON. Passing gas but will hiccups and belching. No n/v and tolerating cld. Drain with 100ml output of seropurlent fluid.    Medications:  Continuous Infusions:  Scheduled Meds:   acetaminophen  650 mg Oral Q8H    aspirin  81 mg Oral Daily    atorvastatin  40 mg Oral QHS    carvediloL  25 mg Oral BID    ibuprofen  400 mg Oral Q6H WAKE    magnesium sulfate in water  2 g Intravenous Once    mupirocin   Nasal BID    piperacillin-tazobactam (Zosyn) IV (PEDS and ADULTS) (extended infusion is not appropriate)  4.5 g Intravenous Q8H    pregabalin  50 mg Oral TID     PRN Meds:  Current Facility-Administered Medications:     0.9% NaCl, , Intravenous, PRN    acetaminophen, 650 mg, Oral, Q8H PRN    D10W, 12.5 g, Intravenous, PRN    glucagon (human recombinant), 1 mg, Intramuscular, PRN    glucagon (human recombinant), 1 mg, Intramuscular, PRN    hydrALAZINE, 10 mg, Intravenous, Q6H PRN    insulin aspart U-100, 0-5 Units, Subcutaneous, Q6H PRN    labetalol, 10 mg, Intravenous, Q6H PRN    melatonin, 6 mg, Oral, Nightly PRN    ondansetron, 8 mg, Oral, Q8H PRN    oxyCODONE, 5 mg, Oral, Q4H PRN    sodium chloride 0.9%, 10 mL, Intravenous, PRN     Review of patient's allergies indicates:   Allergen Reactions    Lisinopril Shortness Of Breath     Objective:     Vital Signs (Most Recent):  Temp: 98 °F (36.7 °C) (07/06/24 0335)  Pulse: 83 (07/06/24 0727)  Resp: 18 (07/06/24 0843)  BP: 136/87 (07/06/24 0727)  SpO2: 95 % (07/06/24 0727) Vital Signs (24h Range):  Temp:  [97.7 °F (36.5 °C)-98.7 °F (37.1 °C)] 98 °F (36.7 °C)  Pulse:  [] 83  Resp:  [16-21] 18  SpO2:  [94 %-100 %] 95 %  BP: (120-153)/(72-91) 136/87     Weight: 88.1 kg (194 lb 3.6 oz)  Body mass index is 27.87 kg/m².    Intake/Output - Last 3 Shifts         07/04 0700 07/05 0659 07/05 0700 07/06 0659 07/06 0700  07/07 0659    P.O.  300     I.V. (mL/kg)  1262.9 (14.3)     IV Piggyback   1674.9     Total Intake(mL/kg)  3237.8 (36.8)     Urine (mL/kg/hr)  975     Drains  105     Total Output  1080     Net  +2157.8                     Physical Exam  Vitals and nursing note reviewed.   Constitutional:       General: He is not in acute distress.     Appearance: He is not ill-appearing.   HENT:      Head: Normocephalic and atraumatic.   Eyes:      General: No scleral icterus.     Extraocular Movements: Extraocular movements intact.   Cardiovascular:      Rate and Rhythm: Normal rate and regular rhythm.   Pulmonary:      Effort: Pulmonary effort is normal. No respiratory distress.   Abdominal:      General: There is distension (mild).      Palpations: Abdomen is soft.      Tenderness: There is abdominal tenderness (appropriately TTP). There is no guarding or rebound.      Comments: Incisions cdi  KARTIK drain with thin seropurlent output   Skin:     General: Skin is warm and dry.   Neurological:      General: No focal deficit present.      Mental Status: He is alert and oriented to person, place, and time.          Significant Labs:  I have reviewed all pertinent lab results within the past 24 hours.  CBC:   Recent Labs   Lab 07/06/24  0516   WBC 13.47*   RBC 3.12*   HGB 9.3*   HCT 29.9*      MCV 96   MCH 29.8   MCHC 31.1*     CMP:   Recent Labs   Lab 07/05/24  0539 07/06/24  0516   * 214*   CALCIUM 10.3 9.4   ALBUMIN 3.8 3.1*   PROT 8.2  --     136   K 4.4 5.0   CO2 23 21*    102   BUN 20 18   CREATININE 1.0 1.2   ALKPHOS 105  --    ALT 17  --    AST 14  --    BILITOT 0.6  --        Significant Diagnostics:  I have reviewed all pertinent imaging results/findings within the past 24 hours.

## 2024-07-06 NOTE — PLAN OF CARE
Pt awake and oriented with wife at bedside. Reports stabbing pain to abdomen when moving/repositioning or coughing. Pt given pillow and taught to splint with movement and coughing. Pt demonstrated understanding of the process. Plan of care reviewed. Pain management plan discussed and whiteboard updated with times PRN medications nest available. Dressing to KARTIK drain site C/D/I. [place] purulent drainage in bulb. Safety maintained throughout shiftl.     Problem: Adult Inpatient Plan of Care  Goal: Readiness for Transition of Care  Outcome: Progressing     Problem: Diabetes Comorbidity  Goal: Blood Glucose Level Within Targeted Range  Outcome: Progressing     Problem: Pain Acute  Goal: Optimal Pain Control and Function  Outcome: Progressing

## 2024-07-06 NOTE — ASSESSMENT & PLAN NOTE
Mauro Benitez is a 58 y.o. male with medical problems including prostate ca s/p robotic assisted radical prostatectomy c/b recurrence s/p RT and hormonal therapy, T2DM, and CKD3 who presents with 5 day history of lower abdominal pain and imaging concerning for acute appendicitis. S/p lap appendectomy with abscess washout and drain placement on 7/5. Anesthesia noting nickel sized subepiglottic lesion on induction, recommending outpatient ENT eval.    CLD, dc mIVF  Zosyn x3-4 days  Drain care  MMPC  PRN anti-emetics  SCDs  Outpatient consult to ENT placed  Dispo: continue inpatient care

## 2024-07-06 NOTE — PLAN OF CARE
Patient is AAOx4. Tolerated  clear liquid diet. Ambulated upto bathroom with standby assistance . IV abx maintained. Glucose monitoring continued. PRN pain meds provided for complains of pain. Incisions CDI. KARTIK drain draining serosanguenious drainage.  Instructed to use call light for assistance. Bed alarm set and maintained at lowest position. Call light within reach. Urinal within reach.

## 2024-07-07 LAB
ALBUMIN SERPL BCP-MCNC: 2.8 G/DL (ref 3.5–5.2)
ANION GAP SERPL CALC-SCNC: 10 MMOL/L (ref 8–16)
BASOPHILS # BLD AUTO: 0.01 K/UL (ref 0–0.2)
BASOPHILS NFR BLD: 0.1 % (ref 0–1.9)
BUN SERPL-MCNC: 16 MG/DL (ref 6–20)
CALCIUM SERPL-MCNC: 9.1 MG/DL (ref 8.7–10.5)
CHLORIDE SERPL-SCNC: 105 MMOL/L (ref 95–110)
CO2 SERPL-SCNC: 23 MMOL/L (ref 23–29)
CREAT SERPL-MCNC: 1 MG/DL (ref 0.5–1.4)
DIFFERENTIAL METHOD BLD: ABNORMAL
EOSINOPHIL # BLD AUTO: 0 K/UL (ref 0–0.5)
EOSINOPHIL NFR BLD: 0.4 % (ref 0–8)
ERYTHROCYTE [DISTWIDTH] IN BLOOD BY AUTOMATED COUNT: 14.7 % (ref 11.5–14.5)
EST. GFR  (NO RACE VARIABLE): >60 ML/MIN/1.73 M^2
GLUCOSE SERPL-MCNC: 143 MG/DL (ref 70–110)
HCT VFR BLD AUTO: 27.4 % (ref 40–54)
HGB BLD-MCNC: 8.7 G/DL (ref 14–18)
IMM GRANULOCYTES # BLD AUTO: 0.05 K/UL (ref 0–0.04)
IMM GRANULOCYTES NFR BLD AUTO: 0.5 % (ref 0–0.5)
LYMPHOCYTES # BLD AUTO: 0.5 K/UL (ref 1–4.8)
LYMPHOCYTES NFR BLD: 5 % (ref 18–48)
MAGNESIUM SERPL-MCNC: 1.9 MG/DL (ref 1.6–2.6)
MCH RBC QN AUTO: 29.9 PG (ref 27–31)
MCHC RBC AUTO-ENTMCNC: 31.8 G/DL (ref 32–36)
MCV RBC AUTO: 94 FL (ref 82–98)
MONOCYTES # BLD AUTO: 0.6 K/UL (ref 0.3–1)
MONOCYTES NFR BLD: 5.5 % (ref 4–15)
NEUTROPHILS # BLD AUTO: 9.4 K/UL (ref 1.8–7.7)
NEUTROPHILS NFR BLD: 88.5 % (ref 38–73)
NRBC BLD-RTO: 0 /100 WBC
PHOSPHATE SERPL-MCNC: 3.3 MG/DL (ref 2.7–4.5)
PLATELET # BLD AUTO: 301 K/UL (ref 150–450)
PMV BLD AUTO: 10.3 FL (ref 9.2–12.9)
POCT GLUCOSE: 143 MG/DL (ref 70–110)
POCT GLUCOSE: 181 MG/DL (ref 70–110)
POCT GLUCOSE: 228 MG/DL (ref 70–110)
POCT GLUCOSE: 244 MG/DL (ref 70–110)
POTASSIUM SERPL-SCNC: 4.2 MMOL/L (ref 3.5–5.1)
RBC # BLD AUTO: 2.91 M/UL (ref 4.6–6.2)
SODIUM SERPL-SCNC: 138 MMOL/L (ref 136–145)
WBC # BLD AUTO: 10.61 K/UL (ref 3.9–12.7)

## 2024-07-07 PROCEDURE — 25000003 PHARM REV CODE 250

## 2024-07-07 PROCEDURE — 85025 COMPLETE CBC W/AUTO DIFF WBC: CPT

## 2024-07-07 PROCEDURE — 83735 ASSAY OF MAGNESIUM: CPT

## 2024-07-07 PROCEDURE — 80069 RENAL FUNCTION PANEL: CPT

## 2024-07-07 PROCEDURE — 63600175 PHARM REV CODE 636 W HCPCS

## 2024-07-07 PROCEDURE — 36415 COLL VENOUS BLD VENIPUNCTURE: CPT

## 2024-07-07 PROCEDURE — 11000001 HC ACUTE MED/SURG PRIVATE ROOM

## 2024-07-07 RX ADMIN — PIPERACILLIN AND TAZOBACTAM 4.5 G: 4; .5 INJECTION, POWDER, LYOPHILIZED, FOR SOLUTION INTRAVENOUS; PARENTERAL at 08:07

## 2024-07-07 RX ADMIN — ACETAMINOPHEN 650 MG: 325 TABLET ORAL at 11:07

## 2024-07-07 RX ADMIN — OXYCODONE 5 MG: 5 TABLET ORAL at 06:07

## 2024-07-07 RX ADMIN — INSULIN ASPART 2 UNITS: 100 INJECTION, SOLUTION INTRAVENOUS; SUBCUTANEOUS at 11:07

## 2024-07-07 RX ADMIN — CARVEDILOL 25 MG: 25 TABLET, FILM COATED ORAL at 08:07

## 2024-07-07 RX ADMIN — INSULIN ASPART 1 UNITS: 100 INJECTION, SOLUTION INTRAVENOUS; SUBCUTANEOUS at 09:07

## 2024-07-07 RX ADMIN — ATORVASTATIN CALCIUM 40 MG: 40 TABLET, FILM COATED ORAL at 08:07

## 2024-07-07 RX ADMIN — IBUPROFEN 400 MG: 400 TABLET ORAL at 08:07

## 2024-07-07 RX ADMIN — PIPERACILLIN AND TAZOBACTAM 4.5 G: 4; .5 INJECTION, POWDER, LYOPHILIZED, FOR SOLUTION INTRAVENOUS; PARENTERAL at 11:07

## 2024-07-07 RX ADMIN — OXYCODONE 5 MG: 5 TABLET ORAL at 02:07

## 2024-07-07 RX ADMIN — PREGABALIN 50 MG: 50 CAPSULE ORAL at 02:07

## 2024-07-07 RX ADMIN — OXYCODONE 5 MG: 5 TABLET ORAL at 08:07

## 2024-07-07 RX ADMIN — PIPERACILLIN AND TAZOBACTAM 4.5 G: 4; .5 INJECTION, POWDER, LYOPHILIZED, FOR SOLUTION INTRAVENOUS; PARENTERAL at 03:07

## 2024-07-07 RX ADMIN — IBUPROFEN 400 MG: 400 TABLET ORAL at 02:07

## 2024-07-07 RX ADMIN — PREGABALIN 50 MG: 50 CAPSULE ORAL at 08:07

## 2024-07-07 RX ADMIN — ASPIRIN 81 MG: 81 TABLET, COATED ORAL at 08:07

## 2024-07-07 RX ADMIN — ACETAMINOPHEN 650 MG: 325 TABLET ORAL at 05:07

## 2024-07-07 RX ADMIN — Medication 6 MG: at 08:07

## 2024-07-07 RX ADMIN — ACETAMINOPHEN 650 MG: 325 TABLET ORAL at 02:07

## 2024-07-07 RX ADMIN — MUPIROCIN: 20 OINTMENT TOPICAL at 08:07

## 2024-07-07 RX ADMIN — OXYCODONE 5 MG: 5 TABLET ORAL at 10:07

## 2024-07-07 NOTE — NURSING
Patient has swelling and mild pain on his IV site on left forearm. IV removed. Cold application done. Arm elevated. MD aware.

## 2024-07-07 NOTE — PLAN OF CARE
Patient is AAOx4. Tolerated  regular diet. Ambulated in hallway with standby assistance . IV abx maintained. Glucose monitoring continued. PRN pain meds provided for complains of pain. Incisions CDI. KARTIK drain draining serosanguenious purulent drainage.  Instructed to use call light for assistance. Bed alarm set and maintained at lowest position. Call light within reach. Urinal within reach.

## 2024-07-07 NOTE — ASSESSMENT & PLAN NOTE
Mauro Benitez is a 58 y.o. male with medical problems including prostate ca s/p robotic assisted radical prostatectomy c/b recurrence s/p RT and hormonal therapy, T2DM, and CKD3 who presents with 5 day history of lower abdominal pain and imaging concerning for acute appendicitis. S/p lap appendectomy with abscess washout and drain placement on 7/5. Anesthesia noting nickel sized subepiglottic lesion on induction, recommending outpatient ENT eval.    Reg diet  Zosyn x3-4 days  Drain care  MMPC  PRN anti-emetics  Ambulate  SCDs  Outpatient consult to ENT placed  Dispo: continue inpatient care, likely dc tomorrow vs tuesday

## 2024-07-07 NOTE — PROGRESS NOTES
[facility] Surg  General Surgery  Progress Note    Subjective:     History of Present Illness:  Mauro Benitez is a 58 y.o. male with medical problems including prostate cancer s/p robotic assisted radical prostatectomy in 2015 c/b recurrence s/p RT and hormonal ablation, HTN, HLD, CKD3, and T2DM who presented as a transfer for evaluation of acute appendicitis on imaging. Mr. Benitez states his symptoms began approximately 5 days ago with largely suprapubic pain and pain with urination. He initially attributed this to something wrong with his kidneys, however yesterday the pain was much worse and he had a few episodes of emesis overnight. He denies any fevers, chills, diarrhea or constipation. He denies chest pain or shortness of breath at this time. He is not on any blood thinners apart from a baby aspirin. He denies history of CVA, MI, or pulmonary disease. He is an active smoker, 2-3 Black and Milds per day. He works as a .     Post-Op Info:  Procedure(s) (LRB):  APPENDECTOMY, LAPAROSCOPIC (N/A)   2 Days Post-Op     Interval History: NAEON. Passing gas, no belching/hiccups. Tolerating CLD without n/v. Has not ambulated yet. Drain with 15ml of thin seropurlent drainage.    Medications:  Continuous Infusions:  Scheduled Meds:   acetaminophen  650 mg Oral Q8H    aspirin  81 mg Oral Daily    atorvastatin  40 mg Oral QHS    carvediloL  25 mg Oral BID    ibuprofen  400 mg Oral Q6H WAKE    mupirocin   Nasal BID    piperacillin-tazobactam (Zosyn) IV (PEDS and ADULTS) (extended infusion is not appropriate)  4.5 g Intravenous Q8H    pregabalin  50 mg Oral TID     PRN Meds:  Current Facility-Administered Medications:     0.9% NaCl, , Intravenous, PRN    acetaminophen, 650 mg, Oral, Q8H PRN    D10W, 12.5 g, Intravenous, PRN    glucagon (human recombinant), 1 mg, Intramuscular, PRN    glucagon (human recombinant), 1 mg, Intramuscular, PRN    hydrALAZINE, 10 mg, Intravenous, Q6H PRN    insulin aspart U-100, 0-5 Units,  Subcutaneous, Q6H PRN    labetalol, 10 mg, Intravenous, Q6H PRN    melatonin, 6 mg, Oral, Nightly PRN    ondansetron, 8 mg, Oral, Q8H PRN    oxyCODONE, 5 mg, Oral, Q4H PRN    sodium chloride 0.9%, 10 mL, Intravenous, PRN     Review of patient's allergies indicates:   Allergen Reactions    Lisinopril Shortness Of Breath     Objective:     Vital Signs (Most Recent):  Temp: 97 °F (36.1 °C) (07/07/24 0735)  Pulse: 88 (07/07/24 0735)  Resp: 18 (07/07/24 0735)  BP: (!) 137/93 (07/07/24 0735)  SpO2: 95 % (07/07/24 0735) Vital Signs (24h Range):  Temp:  [96.4 °F (35.8 °C)-98.7 °F (37.1 °C)] 97 °F (36.1 °C)  Pulse:  [78-88] 88  Resp:  [18-20] 18  SpO2:  [94 %-97 %] 95 %  BP: (137-158)/(81-93) 137/93     Weight: 88.1 kg (194 lb 3.6 oz)  Body mass index is 27.87 kg/m².    Intake/Output - Last 3 Shifts         07/05 0700  07/06 0659 07/06 0700  07/07 0659 07/07 0700  07/08 0659    P.O. 300 1200     I.V. (mL/kg) 1262.9 (14.3)      IV Piggyback 1674.9      Total Intake(mL/kg) 3237.8 (36.8) 1200 (13.6)     Urine (mL/kg/hr) 975 2250 (1.1) 1000 (5.3)    Drains 105 15     Total Output 1080 2265 1000    Net +2157.8 -1065 -1000                    Physical Exam  Vitals and nursing note reviewed.   Constitutional:       General: He is not in acute distress.     Appearance: He is not ill-appearing.   HENT:      Head: Normocephalic and atraumatic.   Eyes:      General: No scleral icterus.     Extraocular Movements: Extraocular movements intact.   Cardiovascular:      Rate and Rhythm: Normal rate and regular rhythm.   Pulmonary:      Effort: Pulmonary effort is normal. No respiratory distress.   Abdominal:      General: There is no distension.      Palpations: Abdomen is soft.      Tenderness: There is abdominal tenderness (appropriately TTP). There is no guarding or rebound.      Comments: Incisions cdi  KARTIK drain with thin seropurlent output   Skin:     General: Skin is warm and dry.   Neurological:      General: No focal deficit present.       Mental Status: He is alert and oriented to person, place, and time.          Significant Labs:  I have reviewed all pertinent lab results within the past 24 hours.  CBC:   Recent Labs   Lab 07/07/24  0538   WBC 10.61   RBC 2.91*   HGB 8.7*   HCT 27.4*      MCV 94   MCH 29.9   MCHC 31.8*     BMP:   Recent Labs   Lab 07/07/24  0538   *      K 4.2      CO2 23   BUN 16   CREATININE 1.0   CALCIUM 9.1   MG 1.9       Significant Diagnostics:  I have reviewed all pertinent imaging results/findings within the past 24 hours.  Assessment/Plan:     * Acute appendicitis  Mauro Benitez is a 58 y.o. male with medical problems including prostate ca s/p robotic assisted radical prostatectomy c/b recurrence s/p RT and hormonal therapy, T2DM, and CKD3 who presents with 5 day history of lower abdominal pain and imaging concerning for acute appendicitis. S/p lap appendectomy with abscess washout and drain placement on 7/5. Anesthesia noting nickel sized subepiglottic lesion on induction, recommending outpatient ENT eval.    Reg diet  Zosyn x3-4 days  Drain care  MMPC  PRN anti-emetics  Ambulate  SCDs  Outpatient consult to ENT placed  Dispo: continue inpatient care, likely dc tomorrow vs tuesday          Trav Mosqueda MD  General Surgery  [facility] Surg

## 2024-07-07 NOTE — SUBJECTIVE & OBJECTIVE
Interval History: NAEON. Passing gas, no belching/hiccups. Tolerating CLD without n/v. Has not ambulated yet. Drain with 15ml of thin seropurlent drainage.    Medications:  Continuous Infusions:  Scheduled Meds:   acetaminophen  650 mg Oral Q8H    aspirin  81 mg Oral Daily    atorvastatin  40 mg Oral QHS    carvediloL  25 mg Oral BID    ibuprofen  400 mg Oral Q6H WAKE    mupirocin   Nasal BID    piperacillin-tazobactam (Zosyn) IV (PEDS and ADULTS) (extended infusion is not appropriate)  4.5 g Intravenous Q8H    pregabalin  50 mg Oral TID     PRN Meds:  Current Facility-Administered Medications:     0.9% NaCl, , Intravenous, PRN    acetaminophen, 650 mg, Oral, Q8H PRN    D10W, 12.5 g, Intravenous, PRN    glucagon (human recombinant), 1 mg, Intramuscular, PRN    glucagon (human recombinant), 1 mg, Intramuscular, PRN    hydrALAZINE, 10 mg, Intravenous, Q6H PRN    insulin aspart U-100, 0-5 Units, Subcutaneous, Q6H PRN    labetalol, 10 mg, Intravenous, Q6H PRN    melatonin, 6 mg, Oral, Nightly PRN    ondansetron, 8 mg, Oral, Q8H PRN    oxyCODONE, 5 mg, Oral, Q4H PRN    sodium chloride 0.9%, 10 mL, Intravenous, PRN     Review of patient's allergies indicates:   Allergen Reactions    Lisinopril Shortness Of Breath     Objective:     Vital Signs (Most Recent):  Temp: 97 °F (36.1 °C) (07/07/24 0735)  Pulse: 88 (07/07/24 0735)  Resp: 18 (07/07/24 0735)  BP: (!) 137/93 (07/07/24 0735)  SpO2: 95 % (07/07/24 0735) Vital Signs (24h Range):  Temp:  [96.4 °F (35.8 °C)-98.7 °F (37.1 °C)] 97 °F (36.1 °C)  Pulse:  [78-88] 88  Resp:  [18-20] 18  SpO2:  [94 %-97 %] 95 %  BP: (137-158)/(81-93) 137/93     Weight: 88.1 kg (194 lb 3.6 oz)  Body mass index is 27.87 kg/m².    Intake/Output - Last 3 Shifts         07/05 0700  07/06 0659 07/06 0700 07/07 0659 07/07 0700  07/08 0659    P.O. 300 1200     I.V. (mL/kg) 1262.9 (14.3)      IV Piggyback 1674.9      Total Intake(mL/kg) 3237.8 (36.8) 1200 (13.6)     Urine (mL/kg/hr) 975 2250 (1.1) 1000  (5.3)    Drains 105 15     Total Output 1080 2265 1000    Net +2157.8 -1065 -1000                    Physical Exam  Vitals and nursing note reviewed.   Constitutional:       General: He is not in acute distress.     Appearance: He is not ill-appearing.   HENT:      Head: Normocephalic and atraumatic.   Eyes:      General: No scleral icterus.     Extraocular Movements: Extraocular movements intact.   Cardiovascular:      Rate and Rhythm: Normal rate and regular rhythm.   Pulmonary:      Effort: Pulmonary effort is normal. No respiratory distress.   Abdominal:      General: There is no distension.      Palpations: Abdomen is soft.      Tenderness: There is abdominal tenderness (appropriately TTP). There is no guarding or rebound.      Comments: Incisions cdi  KARTIK drain with thin seropurlent output   Skin:     General: Skin is warm and dry.   Neurological:      General: No focal deficit present.      Mental Status: He is alert and oriented to person, place, and time.          Significant Labs:  I have reviewed all pertinent lab results within the past 24 hours.  CBC:   Recent Labs   Lab 07/07/24  0538   WBC 10.61   RBC 2.91*   HGB 8.7*   HCT 27.4*      MCV 94   MCH 29.9   MCHC 31.8*     BMP:   Recent Labs   Lab 07/07/24  0538   *      K 4.2      CO2 23   BUN 16   CREATININE 1.0   CALCIUM 9.1   MG 1.9       Significant Diagnostics:  I have reviewed all pertinent imaging results/findings within the past 24 hours.

## 2024-07-08 ENCOUNTER — TELEPHONE (OUTPATIENT)
Dept: SURGERY | Facility: CLINIC | Age: 59
End: 2024-07-08
Payer: MEDICAID

## 2024-07-08 VITALS
BODY MASS INDEX: 27.81 KG/M2 | HEIGHT: 70 IN | TEMPERATURE: 99 F | DIASTOLIC BLOOD PRESSURE: 85 MMHG | WEIGHT: 194.25 LBS | RESPIRATION RATE: 18 BRPM | SYSTOLIC BLOOD PRESSURE: 159 MMHG | HEART RATE: 85 BPM | OXYGEN SATURATION: 96 %

## 2024-07-08 PROBLEM — K35.80 ACUTE APPENDICITIS: Status: RESOLVED | Noted: 2024-07-05 | Resolved: 2024-07-08

## 2024-07-08 LAB
ALBUMIN SERPL BCP-MCNC: 2.5 G/DL (ref 3.5–5.2)
ANION GAP SERPL CALC-SCNC: 11 MMOL/L (ref 8–16)
BASOPHILS # BLD AUTO: 0.03 K/UL (ref 0–0.2)
BASOPHILS NFR BLD: 0.3 % (ref 0–1.9)
BUN SERPL-MCNC: 21 MG/DL (ref 6–20)
CALCIUM SERPL-MCNC: 8.9 MG/DL (ref 8.7–10.5)
CHLORIDE SERPL-SCNC: 106 MMOL/L (ref 95–110)
CO2 SERPL-SCNC: 19 MMOL/L (ref 23–29)
CREAT SERPL-MCNC: 1.1 MG/DL (ref 0.5–1.4)
DIFFERENTIAL METHOD BLD: ABNORMAL
EOSINOPHIL # BLD AUTO: 0.3 K/UL (ref 0–0.5)
EOSINOPHIL NFR BLD: 2.7 % (ref 0–8)
ERYTHROCYTE [DISTWIDTH] IN BLOOD BY AUTOMATED COUNT: 14.7 % (ref 11.5–14.5)
EST. GFR  (NO RACE VARIABLE): >60 ML/MIN/1.73 M^2
GLUCOSE SERPL-MCNC: 152 MG/DL (ref 70–110)
HCT VFR BLD AUTO: 28.2 % (ref 40–54)
HGB BLD-MCNC: 8.7 G/DL (ref 14–18)
IMM GRANULOCYTES # BLD AUTO: 0.05 K/UL (ref 0–0.04)
IMM GRANULOCYTES NFR BLD AUTO: 0.5 % (ref 0–0.5)
LYMPHOCYTES # BLD AUTO: 0.5 K/UL (ref 1–4.8)
LYMPHOCYTES NFR BLD: 5.1 % (ref 18–48)
MAGNESIUM SERPL-MCNC: 1.7 MG/DL (ref 1.6–2.6)
MCH RBC QN AUTO: 29.7 PG (ref 27–31)
MCHC RBC AUTO-ENTMCNC: 30.9 G/DL (ref 32–36)
MCV RBC AUTO: 96 FL (ref 82–98)
MONOCYTES # BLD AUTO: 0.7 K/UL (ref 0.3–1)
MONOCYTES NFR BLD: 7 % (ref 4–15)
NEUTROPHILS # BLD AUTO: 8.7 K/UL (ref 1.8–7.7)
NEUTROPHILS NFR BLD: 84.4 % (ref 38–73)
NRBC BLD-RTO: 0 /100 WBC
PHOSPHATE SERPL-MCNC: 3.6 MG/DL (ref 2.7–4.5)
PLATELET # BLD AUTO: 279 K/UL (ref 150–450)
PMV BLD AUTO: 10.7 FL (ref 9.2–12.9)
POCT GLUCOSE: 152 MG/DL (ref 70–110)
POTASSIUM SERPL-SCNC: 4.8 MMOL/L (ref 3.5–5.1)
RBC # BLD AUTO: 2.93 M/UL (ref 4.6–6.2)
SODIUM SERPL-SCNC: 136 MMOL/L (ref 136–145)
WBC # BLD AUTO: 10.3 K/UL (ref 3.9–12.7)

## 2024-07-08 PROCEDURE — 85025 COMPLETE CBC W/AUTO DIFF WBC: CPT

## 2024-07-08 PROCEDURE — 36415 COLL VENOUS BLD VENIPUNCTURE: CPT

## 2024-07-08 PROCEDURE — 83735 ASSAY OF MAGNESIUM: CPT

## 2024-07-08 PROCEDURE — 80069 RENAL FUNCTION PANEL: CPT

## 2024-07-08 PROCEDURE — 25000003 PHARM REV CODE 250

## 2024-07-08 RX ORDER — OXYCODONE HYDROCHLORIDE 5 MG/1
5 TABLET ORAL EVERY 4 HOURS PRN
Qty: 12 TABLET | Refills: 0 | Status: SHIPPED | OUTPATIENT
Start: 2024-07-08

## 2024-07-08 RX ORDER — AMOXICILLIN AND CLAVULANATE POTASSIUM 875; 125 MG/1; MG/1
1 TABLET, FILM COATED ORAL EVERY 12 HOURS
Qty: 10 TABLET | Refills: 0 | Status: SHIPPED | OUTPATIENT
Start: 2024-07-08 | End: 2024-07-13

## 2024-07-08 RX ADMIN — OXYCODONE 5 MG: 5 TABLET ORAL at 09:07

## 2024-07-08 RX ADMIN — OXYCODONE 5 MG: 5 TABLET ORAL at 05:07

## 2024-07-08 RX ADMIN — MUPIROCIN: 20 OINTMENT TOPICAL at 08:07

## 2024-07-08 RX ADMIN — PREGABALIN 50 MG: 50 CAPSULE ORAL at 08:07

## 2024-07-08 RX ADMIN — CARVEDILOL 25 MG: 25 TABLET, FILM COATED ORAL at 08:07

## 2024-07-08 RX ADMIN — IBUPROFEN 400 MG: 400 TABLET ORAL at 08:07

## 2024-07-08 RX ADMIN — ACETAMINOPHEN 650 MG: 325 TABLET ORAL at 05:07

## 2024-07-08 RX ADMIN — ASPIRIN 81 MG: 81 TABLET, COATED ORAL at 08:07

## 2024-07-08 NOTE — PLAN OF CARE
D/c orders noted, no DME, no HH.     Pt's significant other Gretel will provide transportation home following discharge.     Pt is cleared to go from CM standpoint.     Future Appointments   Date Time Provider Department Center   7/12/2024  2:20 PM Manjit Newby MD [facility]   8/12/2024  9:15 AM Beronica Mcginnis MD [facility]   9/6/2024  8:10 AM HOSPITAL LAB, [facility] LAB [facility] LAB [facility]   9/18/2024  9:00 AM LAB, [facility]  [facility] [place]   9/18/2024 10:00 AM Edward Zuleta MD [facility] [place]   9/24/2024 11:00 AM Edward Edmond MD [facility] [place]         07/08/24 0952   Final Note   Assessment Type Final Discharge Note   Anticipated Discharge Disposition Home   What phone number can be called within the next 1-3 days to see how you are doing after discharge? [phone]   Post-Acute Status   Discharge Delays None known at this time        Abdominal Pain, N/V/D

## 2024-07-08 NOTE — TELEPHONE ENCOUNTER
----- Message from Wendy Larkin sent at 7/8/2024  1:08 PM CDT -----  Regarding: Pt's Girlfriend Gretel Wagner called to r/s the pt's 7/12/24 post op appt and she would like a call back today  Appointment Access Request:    Pt's Girlfriend Gretel Wagner called to r/s the pt's 7/12/24 post op appt and she would like a call back today    Ms Majano can be reached at [phone]          07/08/2024                    1613  Attempted to contact Gretel Wagner regarding the above message. No answer. Left voicemail.

## 2024-07-08 NOTE — TELEPHONE ENCOUNTER
----- Message from Kate Mcrgaw sent at 7/8/2024  4:23 PM CDT -----  Type:  Patient Returning Call    Who Called: Pt's girlfriend   Who Left Message for Patient: Chacho   Does the patient know what this is regarding?: Returning a missed call   Would the patient rather a call back or a response via [facility]? Call back   Best Call Back Number: [phone]            07/08/2024                      1639  Spoke to Gretel, patient's girlfriend, regarding the above message. Rescheduled patient's appointment to 07/18/2024 at 10:20 in [place]. Confirmed appointment date, time, and location. Gretel verbalized understanding.

## 2024-07-08 NOTE — PROGRESS NOTES
Virtual Nurse:Discharge orders noted; additional clinical references attached.  and pharmacy tech notified.  Patient's discharge instruction packet given by bedside RN.    Cued into patient's room.  Permission received per patient to turn camera to view patient.  Introduced as VN that will be instructing on discharge instructions.  Significant other, Chloe, at bedside.  Educated patient on reason for admission; medications to hold, continue, and start, appointment to follow-up with doctor, and when to return to ED. Teach back method used. Verbalized understanding  Number given for 24/7 Nurse Line. Opportunity given for questions and questions answered.  Bedside nurse updated. Transport to [facility] requested.        07/08/24 1023   Shift   Virtual Nurse - Patient Verbalized Approval Of Camera Use;VN Rounding   Type of Frequent Check   Type Patient Rounds   Safety/Activity   Patient Rounds visualized patient   Safety Promotion/Fall Prevention Fall Risk reviewed with patient/family   Activity Assistance Provided independent   Positioning   Body Position supine

## 2024-07-08 NOTE — DISCHARGE INSTRUCTIONS
POSTOPERATIVE INSTRUCTIONS FOLLOWING SURGERY    The following are post-operative instructions that will help you to recover from your surgery. Please read over these instructions carefully and contact us if we can answer any of your questions or concerns.    Dressing  You should shower daily starting 24 hrs after surgery. Use a mild soap and pat incisions dry. Do not scrub aggressively or soak incisions. No lotions to incisions for the two weeks. Do not take a tub bath and do not soak the surgical site until the wound has completely healed over (at least 2 weeks) and you have been cleared at your follow up visit.  Please do not pick at the skin glue for 2 weeks.    Activity  You should be able to return to your regular activities 2-3 days after your surgery but some muscle / incision soreness is normal.   Do not engage in strenuous activities in which you use your upper body such as: golf, tennis, aerobics, washing windows, raking the yard, mopping, vacuuming or heavy lifting (e.g children). Gentle activity, including walking and stairs, are ok as tolerated. No heavy lifting > 10-15 pounds until assessed at follow up office visit.    Medications  You can restart your medications for chemo once you discharge from the hospital.  You have been prescribed a 5 day course of antibiotics. Please take all your antibiotics even if you feel better..    Medication for pain  You may find that over the counter pain medications may be sufficient for your pain.  You will be given a prescription for pain medication for more severe pain. You should not drive or operate machinery while taking these. Please take narcotics with food. Narcotics can cause, or worsen, constipation. You will need to increase your fluid intake, eat high fiber foods (such as fruits and bran) and make sure that you are up and walking. You may need to take an over the counter stool softener or miralax for constipation.    Please call the office to report the  following:  - Temperature greater than 101 degrees  - Discharge or bad odor from the wound  - Excessive bleeding, such as bloody dressing or extreme bruising  - Redness at incision and/or drain sites  - Swelling or buildup of fluid around incision  - Shortness of breath    Additional information  You will be following up for a post-operative wound check and visit in the next 1-2 weeks. If this appointment has not previously been scheduled, please call office (number below) to schedule as soon as possible.    Should you have any concerns or questions, please call:  1. [facility]  ([phone]) after business hours  2. Go the the Emergency Department if you feel you need urgent attention.

## 2024-07-08 NOTE — PLAN OF CARE
SOCIAL WORK DISCHARGE PLANNING ASSESSMENT    SW completed discharge planning assessment with pt. Pt was easily engaged and education on the role of  was provided. Pt reported he lives with his significant other Gretel ([phone]). Pt stated he has good support from family and friends and advised Gretel will provide assistance as needed after returning home. Pt has no DME and is not current with  services. Pt drives himself to doctor appointments and Gretel will provide transportation home following discharge. Pt stated that sometimes it is hard to afford medications. SW provided pt with a [employer] card. No other needs for community resources were reported. Pt was encouraged to call with any questions or concerns. Pt verbalized understanding.     Future Appointments   Date Time Provider Department Center   8/12/2024  9:15 AM Beronica Mcginnis MD [facility]   9/6/2024  8:10 AM HOSPITAL LAB, [facility] LAB [facility] LAB [facility]   9/18/2024  9:00 [facility]ol [facility]   9/18/2024 10:00 AM Edward Zuleta MD [facility] HEMONC2 [facility]ol Dupree   9/24/2024 11:00 AM Edward Edmond MD [facility]        Patient Active Problem List   Diagnosis    Chest pain    Hypertension    Type 2 diabetes mellitus with diabetic polyneuropathy, without long-term current use of insulin    GERD (gastroesophageal reflux disease)    Mixed hyperlipidemia    Tobacco abuse    Gastroparesis due to DM    Cannabis abuse    Elevated lipase    Diabetic peripheral neuropathy    Ganglion of right wrist    Prostate cancer metastatic to intrapelvic lymph node    Mass of both adrenal glands    Primary erectile dysfunction    Sleep apnea    Vitamin D deficiency    Depression    Osteopenia    Dehydration    Chronic kidney disease (CKD) stage G3a/A1, moderately decreased glomerular filtration rate (GFR) between 45-59 mL/min/1.73 square meter and albuminuria creatinine ratio less than 30 mg/g    Nausea and  vomiting    Aortic atherosclerosis    Encounter for monitoring androgen deprivation therapy      07/08/24 1819   Discharge Planning   Assessment Type Discharge Planning Brief Assessment   Support Systems Spouse/significant other  (pt's significant other Gretel [phone])   Equipment Currently Used at Home none   Current Living Arrangements home   Patient/Family Anticipates Transition to home;home with family   Patient/Family Anticipated Services at Transition none   DME Needed Upon Discharge  none   Discharge Plan A Home with family   Physical Activity   On average, how many days per week do you engage in moderate to strenuous exercise (like a brisk walk)? Pt Unable   On average, how many minutes do you engage in exercise at this level? Pt Unable   Financial Resource Strain   How hard is it for you to pay for the very basics like food, housing, medical care, and heating? Not very   Housing Stability   In the last 12 months, was there a time when you were not able to pay the mortgage or rent on time? N   At any time in the past 12 months, were you homeless or living in a shelter (including now)? N   Transportation Needs   Has the lack of transportation kept you from medical appointments, meetings, work or from getting things needed for daily living? No   Food Insecurity   Within the past 12 months, you worried that your food would run out before you got the money to buy more. Never true   Within the past 12 months, the food you bought just didn't last and you didn't have money to get more. Never true   Stress   Do you feel stress - tense, restless, nervous, or anxious, or unable to sleep at night because your mind is troubled all the time - these days? Pt Unable   Social Isolation   How often do you feel lonely or isolated from those around you?  Never   Alcohol Use   Q1: How often do you have a drink containing alcohol? Pt Unable   Q2: How many drinks containing alcohol do you have on a typical day when you are  drinking? Pt Unable   Q3: How often do you have six or more drinks on one occasion? Pt Unable   Utilities   In the past 12 months has the electric, gas, oil, or water company threatened to shut off services in your home? No   Health Literacy   How often do you need to have someone help you when you read instructions, pamphlets, or other written material from your doctor or pharmacy? Never

## 2024-07-08 NOTE — PLAN OF CARE
Problem: Adult Inpatient Plan of Care  Goal: Plan of Care Review  Outcome: Progressing  Goal: Patient-Specific Goal (Individualized)  Outcome: Progressing  Goal: Absence of Hospital-Acquired Illness or Injury  Outcome: Progressing  Goal: Optimal Comfort and Wellbeing  Outcome: Progressing  Goal: Readiness for Transition of Care  Outcome: Progressing     Problem: Diabetes Comorbidity  Goal: Blood Glucose Level Within Targeted Range  Outcome: Progressing     Problem: Comorbidity Management  Goal: Blood Pressure in Desired Range  Outcome: Progressing     Problem: Pain Acute  Goal: Optimal Pain Control and Function  Outcome: Progressing     Problem: Infection  Goal: Absence of Infection Signs and Symptoms  Outcome: Progressing     Problem: Wound  Goal: Optimal Coping  Outcome: Progressing  Goal: Optimal Functional Ability  Outcome: Progressing  Goal: Absence of Infection Signs and Symptoms  Outcome: Progressing  Goal: Improved Oral Intake  Outcome: Progressing  Goal: Optimal Pain Control and Function  Outcome: Progressing  Goal: Skin Health and Integrity  Outcome: Progressing  Goal: Optimal Wound Healing  Outcome: Progressing     Problem: Fall Injury Risk  Goal: Absence of Fall and Fall-Related Injury  Outcome: Progressing

## 2024-07-08 NOTE — DISCHARGE SUMMARY
[facility] Surg  General Surgery  Discharge Summary      Patient Name: Mauro Benitez  MRN: [id number]  Admission Date: 7/5/2024  Hospital Length of Stay: 3 days  Discharge Date and Time:  07/08/2024 7:48 AM  Attending Physician: Salazar Scott MD   Discharging Provider: Trav Mosqueda MD  Primary Care Provider: Kosta Roberts MD     HPI: Mauro Benitez is a 58 y.o. male with medical problems including prostate cancer s/p robotic assisted radical prostatectomy in 2015 c/b recurrence s/p RT and hormonal ablation, HTN, HLD, CKD3, and T2DM who presented as a transfer for evaluation of acute appendicitis on imaging. Mr. Benitez states his symptoms began approximately 5 days ago with largely suprapubic pain and pain with urination. He initially attributed this to something wrong with his kidneys, however yesterday the pain was much worse and he had a few episodes of emesis overnight. He denies any fevers, chills, diarrhea or constipation. He denies chest pain or shortness of breath at this time. He is not on any blood thinners apart from a baby aspirin. He denies history of CVA, MI, or pulmonary disease. He is an active smoker, 2-3 Black and Milds per day. He works as a .     Procedure(s) (LRB):  APPENDECTOMY, LAPAROSCOPIC (N/A)     Hospital Course: Underwent lap appendectomy, abscess washout, and drain placement without issue. On intubation, anesthesia note a nickel sized subglottic lesion and recommended ENT follow up as an outpatient. Patient made aware of findings and ambulatory ENT referral placed. He received a 3 day course of IV abx with transition to PO abx on 7/8. Tolerated regular diet, ambulated appropriately, and voiding spontaneously. Stable for discharge on 7/8.    Physical Exam  Vitals and nursing note reviewed.   Constitutional:       General: He is not in acute distress.     Appearance: He is not ill-appearing.   HENT:      Head: Normocephalic and atraumatic.   Eyes:      General: No scleral  icterus.     Extraocular Movements: Extraocular movements intact.   Cardiovascular:      Rate and Rhythm: Normal rate and regular rhythm.   Pulmonary:      Effort: Pulmonary effort is normal. No respiratory distress.   Abdominal:      General: There is no distension.      Palpations: Abdomen is soft.      Tenderness: There is abdominal tenderness (appropriately TTP). There is no guarding or rebound.      Comments: Incisions cdi  KARTIK drain with thin serosanguinous output   Skin:     General: Skin is warm and dry.   Neurological:      General: No focal deficit present.      Mental Status: He is alert and oriented to person, place, and time.     Significant Diagnostic Studies: N/A    Pending Diagnostic Studies:       Procedure Component Value Units Date/Time    Specimen to Pathology, Surgery General Surgery [[id number]] Collected: 07/05/24 1503    Order Status: Sent Lab Status: In process Updated: 07/08/24 0701    Specimen: Tissue           Final Active Diagnoses:      Problems Resolved During this Admission:    Diagnosis Date Noted Date Resolved POA    PRINCIPAL PROBLEM:  Acute appendicitis [K35.80] 07/05/2024 07/08/2024 Yes      Discharged Condition: good    Disposition: Home or Self Care    Follow Up:   Follow-up Information       [facility] Follow up.    Specialty: Otolaryngology  Contact information:  [address]  [facility] [address] [phone]  Additional information:  Please park in garage or surface lot and use Medical Office Bldg entrance. Check in at [address].              Jacob Diana MD Follow up on 7/12/2024.    Specialties: Surgery, General Surgery  Contact information:  [address]  [address]  [facility] [address] [phone]                           Patient Instructions:      Ambulatory referral/consult to ENT   Standing Status: Future   Referral Priority: Routine Referral Type: Consultation   Referral Reason: Specialty Services Required   Requested  Specialty: Otolaryngology   Number of Visits Requested: 1     Diet Adult Regular     No dressing needed     Notify your health care provider if you experience any of the following:  temperature >100.4     Notify your health care provider if you experience any of the following:  persistent nausea and vomiting or diarrhea     Notify your health care provider if you experience any of the following:  severe uncontrolled pain     Notify your health care provider if you experience any of the following:  redness, tenderness, or signs of infection (pain, swelling, redness, odor or green/yellow discharge around incision site)     Notify your health care provider if you experience any of the following:  difficulty breathing or increased cough     Notify your health care provider if you experience any of the following:  severe persistent headache     Notify your health care provider if you experience any of the following:  worsening rash     Notify your health care provider if you experience any of the following:  persistent dizziness, light-headedness, or visual disturbances     Notify your health care provider if you experience any of the following:  increased confusion or weakness     Activity as tolerated     Medications:  Reconciled Home Medications:      Medication List        START taking these medications      amoxicillin-clavulanate 875-125mg 875-125 mg per tablet  Commonly known as: AUGMENTIN  Take 1 tablet by mouth every 12 (twelve) hours. for 5 days     oxyCODONE 5 MG immediate release tablet  Commonly known as: ROXICODONE  Take 1 tablet (5 mg total) by mouth every 4 (four) hours as needed for Pain.            CONTINUE taking these medications      abiraterone 250 mg Tab  Commonly known as: ZYTIGA  Take 1 tablet (250 mg total) by mouth once daily Take as directed with a low fat meal.     acetaminophen 500 MG tablet  Commonly known as: TYLENOL  Take 1,500 mg by mouth daily as needed for Pain.     amitriptyline 25 MG  "tablet  Commonly known as: ELAVIL  Take 25 mg by mouth every evening.     amLODIPine 10 MG tablet  Commonly known as: NORVASC  Take 10 mg by mouth every evening.     aspirin 81 MG EC tablet  Commonly known as: ECOTRIN  Aspir-81 mg tablet,delayed release   Take 1 tablet every day by oral route in the morning for 30 days.     atorvastatin 40 MG tablet  Commonly known as: LIPITOR  atorvastatin 40 mg tablet   TAKE 1 TABLET BY MOUTH EVERY DAY AT BEDTIME     BD ULTRA-FINE XIAO PEN NEEDLE 32 gauge x 5/32" Ndle  Generic drug: pen needle, diabetic  USE AS DIRECTED DAILY.     carvediloL 25 MG tablet  Commonly known as: COREG  Take 1 tablet (25 mg total) by mouth 2 (two) times daily.     cholecalciferol (vitamin D3) 25 mcg (1,000 unit) capsule  Commonly known as: VITAMIN D3  Take 1 capsule (1,000 Units total) by mouth once daily.     fluticasone propionate 50 mcg/actuation nasal spray  Commonly known as: FLONASE  fluticasone propionate 50 mcg/actuation nasal spray,suspension   SPRAY 2 SPRAYS EVERY DAY BY INTRANASAL ROUTE AS NEEDED.     insulin glargine U-100 (Lantus) 100 unit/mL (3 mL) Inpn pen  Inject 15 Units into the skin every evening.     losartan 100 MG tablet  Commonly known as: COZAAR  Take 100 mg by mouth once daily.     meclizine 25 mg tablet  Commonly known as: ANTIVERT  Take 1 tablet (25 mg total) by mouth 3 (three) times daily as needed (dizziness).     metFORMIN 1000 MG tablet  Commonly known as: GLUCOPHAGE  metformin 1,000 mg tablet   TAKE 1 TABLET BY MOUTH TWICE DAILY     mirabegron 25 mg Tb24 ER tablet  Commonly known as: MYRBETRIQ  Take 1 tablet (25 mg total) by mouth once daily.     omeprazole 20 MG capsule  Commonly known as: PRILOSEC  Take 20 mg by mouth once daily.     ondansetron 4 MG Tbdl  Commonly known as: ZOFRAN-ODT  Take 1 tablet (4 mg total) by mouth every 6 (six) hours as needed (nausea).     * ONETOUCH VERIO TEST STRIPS MISC  OneTouch Verio test strips     * blood sugar diagnostic Strp  Accu-Chek " Odalis Plus test strips   Take 1 strip twice a day by miscell. route.     * ONETOUCH VERIO TEST STRIPS MISC  OneTouch Verio test strips   USE 1 TEST STRIP TO CHECK BLOOD SUGAR TWICE DAILY     predniSONE 5 MG tablet  Commonly known as: DELTASONE  Take 1 tablet (5 mg total) by mouth once daily. Take as directed with water on an empty stomach.     pregabalin 50 MG capsule  Commonly known as: LYRICA  Take 1 capsule (50 mg total) by mouth 3 (three) times daily.           * This list has 3 medication(s) that are the same as other medications prescribed for you. Read the directions carefully, and ask your doctor or other care provider to review them with you.                  Trav Mosqueda MD  General Surgery  [facility] Surg

## 2024-07-09 PROBLEM — K56.7 ILEUS: Status: ACTIVE | Noted: 2024-07-09

## 2024-07-09 LAB
FINAL PATHOLOGIC DIAGNOSIS: NORMAL
GROSS: NORMAL
Lab: NORMAL

## 2024-07-09 RX ORDER — ONDANSETRON 4 MG/1
4 TABLET, FILM COATED ORAL EVERY 6 HOURS PRN
Qty: 20 TABLET | Refills: 0 | Status: SHIPPED | OUTPATIENT
Start: 2024-07-09

## 2024-07-10 PROBLEM — F17.200 TOBACCO DEPENDENCY: Status: ACTIVE | Noted: 2024-07-10

## 2024-07-10 PROBLEM — R11.2 NAUSEA AND VOMITING: Status: RESOLVED | Noted: 2023-05-05 | Resolved: 2024-07-10

## 2024-07-18 ENCOUNTER — OFFICE VISIT (OUTPATIENT)
Dept: SURGERY | Facility: CLINIC | Age: 59
End: 2024-07-18
Payer: MEDICAID

## 2024-07-18 VITALS — WEIGHT: 192.44 LBS | BODY MASS INDEX: 27.55 KG/M2 | HEIGHT: 70 IN

## 2024-07-18 DIAGNOSIS — G89.18 POSTOPERATIVE PAIN: Primary | ICD-10-CM

## 2024-07-18 PROCEDURE — 3066F NEPHROPATHY DOC TX: CPT | Mod: CPTII,,, | Performed by: SURGERY

## 2024-07-18 PROCEDURE — 1160F RVW MEDS BY RX/DR IN RCRD: CPT | Mod: CPTII,,, | Performed by: SURGERY

## 2024-07-18 PROCEDURE — 1159F MED LIST DOCD IN RCRD: CPT | Mod: CPTII,,, | Performed by: SURGERY

## 2024-07-18 PROCEDURE — 4010F ACE/ARB THERAPY RXD/TAKEN: CPT | Mod: CPTII,,, | Performed by: SURGERY

## 2024-07-18 PROCEDURE — 99024 POSTOP FOLLOW-UP VISIT: CPT | Mod: ,,, | Performed by: SURGERY

## 2024-07-18 PROCEDURE — 99999 PR PBB SHADOW E&M-EST. PATIENT-LVL III: CPT | Mod: PBBFAC,,, | Performed by: SURGERY

## 2024-07-18 PROCEDURE — 99213 OFFICE O/P EST LOW 20 MIN: CPT | Mod: PBBFAC,PN | Performed by: SURGERY

## 2024-07-18 PROCEDURE — 3044F HG A1C LEVEL LT 7.0%: CPT | Mod: CPTII,,, | Performed by: SURGERY

## 2024-07-18 RX ORDER — KETOROLAC TROMETHAMINE 10 MG/1
10 TABLET, FILM COATED ORAL EVERY 6 HOURS PRN
Qty: 12 TABLET | Refills: 0 | Status: SHIPPED | OUTPATIENT
Start: 2024-07-18

## 2024-07-18 NOTE — LETTER
July 18, 2024      [facility] - General Surgery  [address]  [address]  [place] [address]  Phone: [phone]  Fax: [phone]       Patient: Mauro Benitez   YOB: 1965  Date of Visit: 07/18/2024    To Whom It May Concern:    Carmen Benitez  [facility] at [facility] on 07/18/2024. The patient may return to work on 07/23/2024 with light duty restrictions. No pushing, pulling, lifting, carrying or any other exertion greater than 10 pounds. Mr. Benitez may resume normal activities on 07/29/2024 without restrictions. If you have any questions or concerns, or if I can be of further assistance, please do not hesitate to contact me.    Sincerely,    Dr. Manjit Newby

## 2024-07-18 NOTE — LETTER
July 18, 2024        Kosta Roberts MD  [address] [address]             [facility] - General Surgery  [address]  [address]  [facility] [address]  Phone: [phone]  Fax: [phone]   Patient: Mauro Benitez   MR Number: [id number]   YOB: 1965   Date of Visit: 7/18/2024       Dear Dr. Roberts:    Thank you for referring Mauro Benitez to me for evaluation. Below are the relevant portions of my assessment and plan of care.            If you have questions, please do not hesitate to call me. I look forward to following Mauro along with you.    Sincerely,      Manjit Newby MD           CC    No Recipients

## 2024-07-18 NOTE — PROGRESS NOTES
[facility]  POST-OP NOTE    HPI: Mauro Benitez is a 58 y.o. male doing well, drain was removed during readmission, dont think pt ever had SBO; he feels well, still some mild abdominal wall discomfort      VITALS:  There were no vitals filed for this visit.    PHYSICAL EXAM:  GEN: NAD  abd: incisions C/D/I    PATHOLOGY:  Reviewed      ASSESSMENT & PLAN:  58 y.o. male, ok to resume normal activity in 3 weeks total postop      Manjit Newby M.D., F.A.C.S.  [facility] General Surgery  [facility]

## 2024-09-09 ENCOUNTER — PATIENT MESSAGE (OUTPATIENT)
Dept: UROLOGY | Facility: CLINIC | Age: 59
End: 2024-09-09
Payer: MEDICAID

## 2024-09-18 ENCOUNTER — OFFICE VISIT (OUTPATIENT)
Dept: HEMATOLOGY/ONCOLOGY | Facility: CLINIC | Age: 59
End: 2024-09-18
Payer: MEDICAID

## 2024-09-18 ENCOUNTER — LAB VISIT (OUTPATIENT)
Dept: LAB | Facility: HOSPITAL | Age: 59
End: 2024-09-18
Payer: MEDICAID

## 2024-09-18 VITALS
WEIGHT: 204.06 LBS | DIASTOLIC BLOOD PRESSURE: 78 MMHG | SYSTOLIC BLOOD PRESSURE: 120 MMHG | HEART RATE: 88 BPM | BODY MASS INDEX: 28.46 KG/M2

## 2024-09-18 DIAGNOSIS — C77.5 PROSTATE CANCER METASTATIC TO INTRAPELVIC LYMPH NODE: Primary | ICD-10-CM

## 2024-09-18 DIAGNOSIS — C61 PROSTATE CANCER METASTATIC TO INTRAPELVIC LYMPH NODE: Primary | ICD-10-CM

## 2024-09-18 DIAGNOSIS — E11.42 TYPE 2 DIABETES MELLITUS WITH DIABETIC POLYNEUROPATHY, WITHOUT LONG-TERM CURRENT USE OF INSULIN: ICD-10-CM

## 2024-09-18 DIAGNOSIS — C61 PROSTATE CANCER METASTATIC TO INTRAPELVIC LYMPH NODE: ICD-10-CM

## 2024-09-18 DIAGNOSIS — J38.7 EPIGLOTTIC LESION: ICD-10-CM

## 2024-09-18 DIAGNOSIS — C77.5 PROSTATE CANCER METASTATIC TO INTRAPELVIC LYMPH NODE: ICD-10-CM

## 2024-09-18 LAB
ALBUMIN SERPL BCP-MCNC: 4 G/DL (ref 3.5–5.2)
ALP SERPL-CCNC: 120 U/L (ref 55–135)
ALT SERPL W/O P-5'-P-CCNC: 18 U/L (ref 10–44)
ANION GAP SERPL CALC-SCNC: 14 MMOL/L (ref 8–16)
AST SERPL-CCNC: 15 U/L (ref 10–40)
BASOPHILS # BLD AUTO: 0.04 K/UL (ref 0–0.2)
BASOPHILS NFR BLD: 0.7 % (ref 0–1.9)
BILIRUB SERPL-MCNC: 0.6 MG/DL (ref 0.1–1)
BUN SERPL-MCNC: 21 MG/DL (ref 6–20)
CALCIUM SERPL-MCNC: 10.1 MG/DL (ref 8.7–10.5)
CHLORIDE SERPL-SCNC: 103 MMOL/L (ref 95–110)
CO2 SERPL-SCNC: 16 MMOL/L (ref 23–29)
COMPLEXED PSA SERPL-MCNC: <0.01 NG/ML (ref 0–4)
CREAT SERPL-MCNC: 1.2 MG/DL (ref 0.5–1.4)
DIFFERENTIAL METHOD BLD: ABNORMAL
EOSINOPHIL # BLD AUTO: 0.1 K/UL (ref 0–0.5)
EOSINOPHIL NFR BLD: 1.2 % (ref 0–8)
ERYTHROCYTE [DISTWIDTH] IN BLOOD BY AUTOMATED COUNT: 18 % (ref 11.5–14.5)
EST. GFR  (NO RACE VARIABLE): >60 ML/MIN/1.73 M^2
GLUCOSE SERPL-MCNC: 188 MG/DL (ref 70–110)
HCT VFR BLD AUTO: 38.1 % (ref 40–54)
HGB BLD-MCNC: 10.9 G/DL (ref 14–18)
IMM GRANULOCYTES # BLD AUTO: 0.07 K/UL (ref 0–0.04)
IMM GRANULOCYTES NFR BLD AUTO: 1.2 % (ref 0–0.5)
LYMPHOCYTES # BLD AUTO: 0.7 K/UL (ref 1–4.8)
LYMPHOCYTES NFR BLD: 11.8 % (ref 18–48)
MCH RBC QN AUTO: 28.2 PG (ref 27–31)
MCHC RBC AUTO-ENTMCNC: 28.6 G/DL (ref 32–36)
MCV RBC AUTO: 98 FL (ref 82–98)
MONOCYTES # BLD AUTO: 0.4 K/UL (ref 0.3–1)
MONOCYTES NFR BLD: 6.4 % (ref 4–15)
NEUTROPHILS # BLD AUTO: 4.5 K/UL (ref 1.8–7.7)
NEUTROPHILS NFR BLD: 78.7 % (ref 38–73)
NRBC BLD-RTO: 0 /100 WBC
PLATELET # BLD AUTO: 466 K/UL (ref 150–450)
PMV BLD AUTO: 11.4 FL (ref 9.2–12.9)
POTASSIUM SERPL-SCNC: 4.5 MMOL/L (ref 3.5–5.1)
PROT SERPL-MCNC: 7.9 G/DL (ref 6–8.4)
RBC # BLD AUTO: 3.87 M/UL (ref 4.6–6.2)
SODIUM SERPL-SCNC: 133 MMOL/L (ref 136–145)
WBC # BLD AUTO: 5.74 K/UL (ref 3.9–12.7)

## 2024-09-18 PROCEDURE — 4010F ACE/ARB THERAPY RXD/TAKEN: CPT | Mod: CPTII,,, | Performed by: HOSPITALIST

## 2024-09-18 PROCEDURE — 84153 ASSAY OF PSA TOTAL: CPT | Performed by: HOSPITALIST

## 2024-09-18 PROCEDURE — G2211 COMPLEX E/M VISIT ADD ON: HCPCS | Mod: S$PBB,,, | Performed by: HOSPITALIST

## 2024-09-18 PROCEDURE — 80053 COMPREHEN METABOLIC PANEL: CPT | Performed by: HOSPITALIST

## 2024-09-18 PROCEDURE — 1159F MED LIST DOCD IN RCRD: CPT | Mod: CPTII,,, | Performed by: HOSPITALIST

## 2024-09-18 PROCEDURE — 3044F HG A1C LEVEL LT 7.0%: CPT | Mod: CPTII,,, | Performed by: HOSPITALIST

## 2024-09-18 PROCEDURE — 99213 OFFICE O/P EST LOW 20 MIN: CPT | Mod: PBBFAC | Performed by: HOSPITALIST

## 2024-09-18 PROCEDURE — 3074F SYST BP LT 130 MM HG: CPT | Mod: CPTII,,, | Performed by: HOSPITALIST

## 2024-09-18 PROCEDURE — 3008F BODY MASS INDEX DOCD: CPT | Mod: CPTII,,, | Performed by: HOSPITALIST

## 2024-09-18 PROCEDURE — 85025 COMPLETE CBC W/AUTO DIFF WBC: CPT | Performed by: HOSPITALIST

## 2024-09-18 PROCEDURE — 36415 COLL VENOUS BLD VENIPUNCTURE: CPT | Performed by: HOSPITALIST

## 2024-09-18 PROCEDURE — 3078F DIAST BP <80 MM HG: CPT | Mod: CPTII,,, | Performed by: HOSPITALIST

## 2024-09-18 PROCEDURE — 99215 OFFICE O/P EST HI 40 MIN: CPT | Mod: S$PBB,,, | Performed by: HOSPITALIST

## 2024-09-18 PROCEDURE — 99999 PR PBB SHADOW E&M-EST. PATIENT-LVL III: CPT | Mod: PBBFAC,,, | Performed by: HOSPITALIST

## 2024-09-18 PROCEDURE — 3066F NEPHROPATHY DOC TX: CPT | Mod: CPTII,,, | Performed by: HOSPITALIST

## 2024-09-18 NOTE — ASSESSMENT & PLAN NOTE
Finishing up 2 years of hormonal therapy concurrent with his salvage pelvic maren RT. PSA has been undetectable. No further ADT; will finish out his current RX of abiraterone then stop. Instructed patient to continue prednisone for 5-7 days after completing abiraterone.  - No further ADT  - Finish out current RX for abiraterone  - FU in 3 months to start PSA surveillance

## 2024-09-18 NOTE — PROGRESS NOTES
[facility] - FOLLOW UP     Best Contact Phone Number(s): [phone] (home)       Cancer/Stage/TNM:    Cancer Staging   Prostate cancer metastatic to intrapelvic lymph node  Staging form: Prostate, AJCC 8th Edition  - Clinical: Stage IVB (cTX, cN1, cM1a, PSA: 2.8, Grade Group: 3) - Signed by Edward Zuleta MD on 2/1/2023        Reason for visit: Metachronous pelvic node recurrence of prostate cancer following on ADT + AAP sp salvage maren RT.    Treatment:   11/2015   Prostatectomy  2016    Adjuvant RT  2016    Short course ADT  10/2022 -    ADT  02/2023 -    Abiraterone + Prednisone      HPI: Mauro Benitez is a 58 y.o. male with history of prostate cancer sp RALP with positive margins 11/2/2015 with adjuvant RT to the prostate bed. He was subsequently found to have BCR with PSA 2.8  and maren relapse on PSMA imaging 06/2022. He started ADT 10/2022 with addition of /5 started 02/2023 with salvage RT to pelvic nodes 09/2023 - 10/2023.  Medical course notable for recurrent nausea and vomiting thought due to gastroparesis; ultimately underwent appendectomy 07/05/24. He presents to medical oncology clinic for routine follow up.     History has been obtained by chart review and discussion with the patient.    Interval Events:     Had two episodes of syncope about 2 months ago; later with recurrent abdominal pain; found to have appendicitis. Underwent appendectomy 07/05/2024. Incidentally found to have a nickel sized subglottic lesion when intubated for appendectomy. Has ENT follow up coming up. Denies respiratory issues. No dysphagia. No further episodes of abdominal pain or nause. Energy remains low, but working full time. No signficant hot flashes. Denies signficant urinary issues. No urinary hesitancy or weak flow. Reports nocturia 1-2x per night. Reports normal BM. No further episodes of vertigo since surgery a few weeks ago; taking meclizine. PCP trying to arrange MRI for vertigo.       Oncology History   Prostate cancer metastatic to intrapelvic lymph node   10/12/2015 Imaging Significant Findings    CT a/p: No evidence of distant or maren metastatic disease    Bone scan: No evidence of metastatic disease. 1cm rounded area of uptake along left superomedial orbit/sphenoid thought possibly calcified menigioma.     11/2/2015 Surgery    Radical prostatectomy at [facility] in [place]. Pathology with prostate adenocarcinoma up to Jagdeep 4+3 with associated PNI. Positive margin. 0/4 LN involved. Staged pT3aN0     2/23/2016 Imaging Significant Findings    Nuclear bone scan: No evidence of metastatic disease. Note made of uptake in left supraorbital region thought c/w left frontal sinusitis along with uptake in mandible and maxilla c/w periosteal disease.     4/27/2021 Imaging Significant Findings    Bone scan:  There is abnormal increased radiotracer activity identified involving the maxilla and multiple paranasal sinuses likely related to periodontal disease and sinus disease. Recommend clinical correlation and further evaluation with maxillofacial CT as warranted.     6/6/2022 Tumor Markers    PSA 2.8     6/21/2022 Imaging Significant Findings    PSMA PET-CT: In this patient with prostate malignancy status post prostatectomy, there are tracer-avid left para-aortic, presacral/left common iliac, and left external iliac chain nodes as described above, concerning for recurrent metastatic disease.     9/16/2022 -  Hormone Therapy    Start bicalutamide 50mg po daily     10/3/2022 -  Hormone Therapy    Trelstar 11.25mg IM x1     12/19/2022 Tumor Markers    PSA 0.02     1/4/2023 -  Hormone Therapy    Trelstart 11.25mg IM x1 with Dr. Edmond     2/1/2023 -  Chemotherapy    Treatment Summary   Plan Name: OP ABIRATERONE DAILY PREDNISONE BID  Treatment Goal: Curative  Status: Active  Start Date: 2/1/2023  End Date: 2/1/2023  Provider: Edward Zuelta MD  Chemotherapy: abiraterone (ZYTIGA)  250 mg Tab, 250 mg (100 % of original dose 250 mg), Oral, Daily, 1 of 1 cycle, Start date: 2/1/2023, End date: --  Dose modification: 250 mg (original dose 250 mg, Cycle 1)     3/27/2023 Imaging Significant Findings    DEXA - Low risk osteopenia; repeat 2 years     3/29/2023 -  Hormone Therapy    Triptorelin 11.25mg     9/19/2023 - 10/26/2023 Radiation Therapy    Treating physician: Jagdish Mata  Total Dose: 50.4 Gy to the pelvic nodes  61.6 Gy to the PET + nodes   Fractions: 28 fractions at 1.8 and 2.2 Gy per fraction           Past Medical History:   Diagnosis Date    Aortic atherosclerosis 04/18/2024    Diabetes mellitus     GERD (gastroesophageal reflux disease)     Hypertension     Prostate cancer     Sleep apnea          Past Surgical History:   Procedure Laterality Date    ESOPHAGOGASTRODUODENOSCOPY N/A 12/5/2022    Procedure: EGD (ESOPHAGOGASTRODUODENOSCOPY);  Surgeon: Beronica Valera MD;  Location: [facility];  Service: Endoscopy;  Laterality: N/A;    LAPAROSCOPIC APPENDECTOMY N/A 7/5/2024    Procedure: APPENDECTOMY, LAPAROSCOPIC;  Surgeon: Salazar Scott MD;  Location: [facility] OR;  Service: General;  Laterality: N/A;    PROSTATECTOMY      WASHOUT  7/5/2024    Procedure: Abscess WASHOUT;  Surgeon: Salazar Scott MD;  Location: [facility] OR;  Service: General;;  abscess washout         I have reviewed and updated the patient's past medical, surgical, family and social histories.     Review of patient's allergies indicates:   Allergen Reactions    Lisinopril Shortness Of Breath         Current Outpatient Medications   Medication Sig Dispense Refill    acetaminophen (TYLENOL) 500 MG tablet Take 1,500 mg by mouth daily as needed for Pain.      amitriptyline (ELAVIL) 25 MG tablet Take 25 mg by mouth every evening.      amLODIPine (NORVASC) 10 MG tablet Take 10 mg by mouth every evening.      aspirin (ECOTRIN) 81 MG EC tablet Aspir-81 mg tablet,delayed release   Take 1 tablet every day by oral route in the  "morning for 30 days.      atorvastatin (LIPITOR) 40 MG tablet atorvastatin 40 mg tablet   TAKE 1 TABLET BY MOUTH EVERY DAY AT BEDTIME      BD ULTRA-FINE XIAO PEN NEEDLE 32 gauge x 5/32" Ndle USE AS DIRECTED DAILY.      blood sugar diagnostic (ONETOUCH VERIO TEST STRIPS MISC) OneTouch Verio test strips      blood sugar diagnostic (ONETOUCH VERIO TEST STRIPS MISC) OneTouch Verio test strips   USE 1 TEST STRIP TO CHECK BLOOD SUGAR TWICE DAILY      blood sugar diagnostic Strp Accu-Chek Odalis Plus test strips   Take 1 strip twice a day by miscell. route.      carvediloL (COREG) 25 MG tablet Take 1 tablet (25 mg total) by mouth 2 (two) times daily. 60 tablet 11    cholecalciferol, vitamin D3, (VITAMIN D3) 25 mcg (1,000 unit) capsule Take 1 capsule (1,000 Units total) by mouth once daily. 30 capsule 11    fluticasone propionate (FLONASE) 50 mcg/actuation nasal spray fluticasone propionate 50 mcg/actuation nasal spray,suspension   SPRAY 2 SPRAYS EVERY DAY BY INTRANASAL ROUTE AS NEEDED.      insulin glargine 100 units/mL SubQ pen Inject 15 Units into the skin every evening.      losartan (COZAAR) 100 MG tablet Take 100 mg by mouth once daily.      meclizine (ANTIVERT) 25 mg tablet Take 1 tablet (25 mg total) by mouth 3 (three) times daily as needed (dizziness). 90 tablet 0    metFORMIN (GLUCOPHAGE) 1000 MG tablet metformin 1,000 mg tablet   TAKE 1 TABLET BY MOUTH TWICE DAILY      mirabegron (MYRBETRIQ) 25 mg Tb24 ER tablet Take 1 tablet (25 mg total) by mouth once daily. 30 tablet 11    omeprazole (PRILOSEC) 20 MG capsule Take 20 mg by mouth once daily.      ondansetron (ZOFRAN) 4 MG tablet Take 1 tablet (4 mg total) by mouth every 6 (six) hours as needed for Nausea. 20 tablet 0    oxyCODONE (ROXICODONE) 5 MG immediate release tablet Take 1 tablet (5 mg total) by mouth every 4 (four) hours as needed for Pain. 12 tablet 0    pregabalin (LYRICA) 50 MG capsule Take 1 capsule (50 mg total) by mouth 3 (three) times daily. 30 " capsule 0     No current facility-administered medications for this visit.        Objective:      Physical Exam:   /78 (BP Location: Left arm, Patient Position: Sitting, BP Method: Large (Automatic))   Pulse 88   Wt 92.5 kg (204 lb 0.6 oz)   BMI 28.46 kg/m²       ECOG Performance status: (1) Restricted in physically strenuous activity, ambulatory and able to do work of light nature     Physical Exam  Vitals and nursing note reviewed.   Constitutional:       Appearance: He is not ill-appearing.   Cardiovascular:      Rate and Rhythm: Normal rate and regular rhythm.      Pulses: Normal pulses.      Heart sounds: Normal heart sounds.   Pulmonary:      Effort: Pulmonary effort is normal.      Breath sounds: Normal breath sounds.   Abdominal:      General: Abdomen is flat.      Palpations: Abdomen is soft.   Neurological:      General: No focal deficit present.      Mental Status: He is oriented to person, place, and time.   Psychiatric:         Mood and Affect: Mood normal.          Recent Labs:   Lab Results   Component Value Date    WBC 5.74 09/18/2024    RBC 3.87 (L) 09/18/2024    HGB 10.9 (L) 09/18/2024    HCT 38.1 (L) 09/18/2024    MCV 98 09/18/2024    MCH 28.2 09/18/2024    MCHC 28.6 (L) 09/18/2024    RDW 18.0 (H) 09/18/2024     (H) 09/18/2024    MPV 11.4 09/18/2024    IMMGR 1.2 (H) 09/18/2024    GRAN 4.5 09/18/2024    GRAN 78.7 (H) 09/18/2024    IGABS 0.07 (H) 09/18/2024    LYMPH 0.7 (L) 09/18/2024    LYMPH 11.8 (L) 09/18/2024    MONO 0.4 09/18/2024    MONO 6.4 09/18/2024    EOS 0.1 09/18/2024    BASO 0.04 09/18/2024    NRBC 0 09/18/2024    EOSINOPHIL 1.2 09/18/2024    BASOPHIL 0.7 09/18/2024    DIFFMETHOD Automated 09/18/2024       Lab Results   Component Value Date     (L) 09/18/2024    K 4.5 09/18/2024     09/18/2024    CO2 16 (L) 09/18/2024     (H) 09/18/2024    BUN 21 (H) 09/18/2024    CREATININE 1.2 09/18/2024    CALCIUM 10.1 09/18/2024    PROT 7.9 09/18/2024    ALBUMIN  4.0 09/18/2024    BILITOT 0.6 09/18/2024    ALKPHOS 120 09/18/2024    AST 15 09/18/2024    ALT 18 09/18/2024    ANIONGAP 14 09/18/2024    EGFRNORACEVR >60.0 09/18/2024        Lab Results   Component Value Date    PSADIAG <0.01 09/18/2024    PSADIAG <0.01 06/18/2024    PSADIAG <0.01 04/30/2024    PSADIAG <0.01 03/19/2024    PSADIAG <0.01 02/05/2024    PSADIAG <0.01 12/26/2023    PSADIAG <0.01 11/29/2023    PSADIAG <0.01 10/20/2023    PSADIAG <0.01 09/08/2023    PSADIAG <0.01 07/21/2023    PSATOTAL <0.01 09/06/2024    PSATOTAL <0.01 06/18/2024    PSATOTAL <0.01 09/08/2023    PSATOTAL <0.01 06/21/2023    PSATOTAL <0.01 03/22/2023    PSATOTAL 0.02 12/19/2022    PSATOTAL 2.8 06/06/2022        Cardiovascular Screening:  Primary care physician: Kosta Roberts MD      The 10-year ASCVD risk score (Niraj MCFADDEN, et al., 2019) is: 31.1%    Values used to calculate the score:      Age: 58 years      Sex: Male      Is Non- : Yes      Diabetic: Yes      Tobacco smoker: Yes      Systolic Blood Pressure: 120 mmHg      Is BP treated: Yes      HDL Cholesterol: 51 mg/dL      Total Cholesterol: 158 mg/dL    ASCVD Risk Level: High risk >= 20%    EKG:   Results for orders placed or performed during the hospital encounter of 05/02/24   EKG 12-lead    Collection Time: 05/02/24  6:04 PM   Result Value Ref Range    QRS Duration 80 ms    OHS QTC Calculation 435 ms    Narrative    Test Reason : R10.13,    Vent. Rate : 087 BPM     Atrial Rate : 087 BPM     P-R Int : 164 ms          QRS Dur : 080 ms      QT Int : 362 ms       P-R-T Axes : 062 041 026 degrees     QTc Int : 435 ms    Normal sinus rhythm  Nonspecific ST abnormality  Abnormal ECG  When compared with ECG of 26-APR-2024 18:39,  Nonspecific T wave abnormality now evident in Lateral leads  Confirmed by Naveen BECK, Yoan HOLT ([id number]) on 5/3/2024 2:09:25 PM    Referred By: JEEVAN CALLAHAN           Confirmed By:Yoan Hodge MD       High blood pressure =  Yes  Antihypertensive agents: amLODIPine - 10 MG  carvediloL - 25 MG  losartan - 100 MG   Comments:     DM2: Yes  Antidiabetic agents: insulin glargine U-100 (Lantus) Inpn - 100 unit/mL (3 mL)  metFORMIN - 1000 MG   Comments:     Hyperlipidemia: Yes  Lipid lowering agents: atorvastatin - 40 MG   Comments:     Antiplatelet therapy: Yes  Agent: aspirin - 81 MG   Comment:     Body mass index is 28.46 kg/m².  If greater than 30, referral to nutrition    Lab Results   Component Value Date    CHOL 158 07/21/2023    LDLCALC 90.8 07/21/2023    HDL 51 07/21/2023    TRIG 81 07/21/2023    HGBA1C 5.4 07/06/2024        Bone Health    Lab Results   Component Value Date    [id number] 42 08/12/2024        Results for orders placed during the hospital encounter of 03/14/23    DXA Bone Density with Vertebral Fracture    Impression  *Low bone mass (Osteopenia); FRAX calculations do not support treatment as osteoporosis.    RECOMMENDATIONS:  *Daily calcium intake 7425-3656 mg, dietary sources preferred; Vitamin D 9712-2264 IU daily.  *Weight bearing exercise and fall precautions.  *If dedicated imaging is negative for vertebral fracture, then no additional treatment is recommended at this time. If positive for fracture, would treat as osteoporosis.  *If patient smokes would recommend cessation.  *Repeat BMD in 2 years.    EXPLANATION OF RESULTS:  T-score compares these results to the average bone density of a 20-29 year-old of the same gender.    Z-score compares this result to the average bone density to people of the same age, gender, and race.    The amounts indicate the number of standard deviations above or below the mean.    * Osteoporosis is generally defined as having a T-score between less than or equal to -2.5.    * Low bone mass (osteopenia) is generally defined as having a T-score between -1.0 and -2.5.    * The normal range is generally defined as having a T-score greater than or equal to -1.0.    * Calculated FRAX  scores for fracture risk prediction may not be accurate in the setting of certain clinical factors such as pharmacologic therapy for osteoporosis, prior fragility fractures, high dose glucocorticoid use.      Electronically signed by: Tayla Amato MD  Date:    03/27/2023  Time:    07:46       Vitamin D: 1000 IU daily  Calcium: Recommend 4 servings of dairy daily     Osteopenia/Osteoporosis: Osteopenia    Bone strengthening agent: None     Staging Imaging     Results for orders placed during the hospital encounter of 06/21/22    NM PET CT F 18 PYL PSMA, Midthigh to Vertex    Impression  In this patient with prostate malignancy status post prostatectomy, there are tracer-avid left para-aortic, presacral/left common iliac, and left external iliac chain nodes as described above, concerning for recurrent metastatic disease.    Additional findings as above.    This report was flagged in Epic as abnormal.    I, Narayan Harding MD, attest that I reviewed and interpreted the images.    Electronically signed by resident: Harshil Castaneda  Date:    06/21/2022  Time:    15:45    Electronically signed by: Narayan Harding  Date:    06/21/2022  Time:    17:34       Results for orders placed during the hospital encounter of 04/27/21    NM Bone Scan Whole Body    Impression  There is abnormal increased radiotracer activity identified involving the maxilla and multiple paranasal sinuses likely related to periodontal disease and sinus disease. Recommend clinical correlation and further evaluation with maxillofacial CT as warranted.      Electronically signed by: Anshul Ayon MD  Date:    04/27/2021  Time:    13:19      No results found for this or any previous visit.       Results for orders placed during the hospital encounter of 04/30/23    CT Abdomen Pelvis With Contrast    Impression  No acute abdominal or pelvic pathology CT of the abdomen and pelvis with contrast.    Mild fatty infiltration of the liver.    Indeterminate adrenal  lesions, which were not increased in size.    Indeterminate 11-12 mm left upper pole renal cystic lesion.  Findings may represent a complex cyst.  If warranted, consider follow-up renal ultrasound when clinically appropriate.    Questionably a mildly thickened urinary bladder wall.  Findings may be due to infection or lower under distension.    Colonic diverticulosis.    This report was flagged in Epic as abnormal.    The preliminary and final reports are near concordant.      Electronically signed by: Anna Proctor  Date:    04/30/2023  Time:    19:04       I have personally reviewed the above imaging.     Path:   Reviewed pathology as documented above.    Genomic testing:     Germline genetic testing  No results found for this or any previous visit.     Somatic tumor genotyping:      ctDNA genotyping:  Miscellaneous Genomic Results       Tumor NGS Blood  Resulted: 5/21/23 Status: Final result       Detected - Pathogenic (2)      Gene Variant Allelic State & Phase   TP53 p.R282W - c.844C>T Missense variant - LOF --   TP53 p.L194R - c.581T>G Missense variant - LOF --                             Diagnoses:     1. Prostate cancer metastatic to intrapelvic lymph node    2. Type 2 diabetes mellitus with diabetic polyneuropathy, without long-term current use of insulin    3. Epiglottic lesion            Assessment and Plan:     1. Prostate cancer metastatic to intrapelvic lymph node  Overview:  Intermediate risk Jagdeep 4+3 disease sp R1 prostatectomy 2015 with adjuvant RT presumably to prostate bed. Now with PSA recurrence and PSMA imaging with regional maren metastases and limited intraabdominal maren mets.Started intensified hormonal therapy with ADT + abiraterone 02/2023. Completed salvage RT with Dr. Mata      Assessment & Plan:  Finishing up 2 years of hormonal therapy concurrent with his salvage pelvic maren RT. PSA has been undetectable. No further ADT; will finish out his current RX of abiraterone then  stop. Instructed patient to continue prednisone for 5-7 days after completing abiraterone.  - No further ADT  - Finish out current RX for abiraterone  - FU in 3 months to start PSA surveillance      2. Type 2 diabetes mellitus with diabetic polyneuropathy, without long-term current use of insulin  Overview:  Complicated by peripheral neuropathy and potentially gastroparesis. Currently on metformin and lantus.    Assessment & Plan:  Kidney function is much improved. Continues on metformin and lantus. Follows with his PCP      3. Epiglottic lesion  Assessment & Plan:  - Has followup with ENT  - Asymptomatic            Follow up:   Route Chart for Scheduling    Med Onc Chart Routing      Follow up with physician 3 months.   Follow up with DONAVAN    Infusion scheduling note    Injection scheduling note    Labs CBC, CMP and PSA   Scheduling:  Preferred lab:  Lab interval:     Imaging    Pharmacy appointment    Other referrals                   Treatment Plan Information   OP ABIRATERONE DAILY PREDNISONE BID Edward Zuleta MD   Associated diagnosis: Prostate cancer metastatic to intrapelvic lymph node Stage IVB cTX, cN1, cM1a, PSA: 2.8, Grade Group: 3 noted on 4/25/2019   Line of treatment: First Line  Treatment Goal: Curative     Upcoming Treatment Dates - OP ABIRATERONE DAILY PREDNISONE BID    No upcoming days in selected categories.    Supportive Plan Information  IV FLUIDS AND ELECTROLYTES Edward Zuleta MD   Associated Diagnosis: Dehydration   noted on 1/29/2024   Line of treatment: Supportive Care   Treatment goal: Supportive     Upcoming Treatment Dates - IV FLUIDS AND ELECTROLYTES    No upcoming days in selected categories.    Therapy Plan Information  INF FLUIDS for Dehydration, noted on 1/29/2024  Flushes  sodium chloride 0.9% flush 10 mL  10 mL, Intravenous, PRN  heparin, porcine (PF) 100 unit/mL injection flush 500 Units  500 Units, Intravenous, PRN    INF FLUIDS for Dehydration, noted on 1/29/2024  IV  Fluids  sodium chloride 0.9% bolus 1,000 mL 1,000 mL  1,000 mL, Intravenous, Every 2 weeks  Flushes  sodium chloride 0.9% flush 10 mL  10 mL, Intravenous, PRN  heparin, porcine (PF) 100 unit/mL injection flush 500 Units  500 Units, Intravenous, PRN      No therapy plan of the specified type found.         The above information has been reviewed with the patient and all questions have been answered to their apparent satisfaction.  They understand that they can call the clinic with any questions.    Edward Zuleta

## 2024-09-23 ENCOUNTER — TELEPHONE (OUTPATIENT)
Dept: UROLOGY | Facility: CLINIC | Age: 59
End: 2024-09-23
Payer: MEDICAID

## 2024-09-23 ENCOUNTER — NURSE TRIAGE (OUTPATIENT)
Dept: ADMINISTRATIVE | Facility: CLINIC | Age: 59
End: 2024-09-23
Payer: MEDICAID

## 2024-09-23 NOTE — TELEPHONE ENCOUNTER
I called pt to reschedule his appt with Dr. Edmond but got VM. I left message indicating that I rescheduled him for 10/25 @ 10:30. I also mailed appt notice to his home. Left my name and callback number.

## 2024-09-23 NOTE — TELEPHONE ENCOUNTER
Caller states that he would like to cancel his appt schedule for tomorrow with urology. Caller appt canceled per his request. Caller informed to contact office or scheduling directly to reschedule.      Reason for Disposition   Health information question, no triage required and triager able to answer question    Additional Information   Negative: Triager needs further essential information from caller in order to complete triage   Negative: [1] Caller requesting NON-URGENT health information AND [2] PCP's office is the best resource   Negative: Requesting regular office appointment    Protocols used: Information Only Call - No Triage-A-

## 2024-10-15 ENCOUNTER — OFFICE VISIT (OUTPATIENT)
Dept: OTOLARYNGOLOGY | Facility: CLINIC | Age: 59
End: 2024-10-15
Payer: MEDICAID

## 2024-10-15 VITALS — WEIGHT: 211.19 LBS | SYSTOLIC BLOOD PRESSURE: 136 MMHG | BODY MASS INDEX: 30.3 KG/M2 | DIASTOLIC BLOOD PRESSURE: 82 MMHG

## 2024-10-15 DIAGNOSIS — J38.7 VALLECULAR CYST: Primary | ICD-10-CM

## 2024-10-15 PROCEDURE — 99999 PR PBB SHADOW E&M-EST. PATIENT-LVL III: CPT | Mod: PBBFAC,,, | Performed by: OTOLARYNGOLOGY

## 2024-10-15 PROCEDURE — 31575 DIAGNOSTIC LARYNGOSCOPY: CPT | Mod: PBBFAC | Performed by: OTOLARYNGOLOGY

## 2024-10-15 PROCEDURE — 99213 OFFICE O/P EST LOW 20 MIN: CPT | Mod: PBBFAC,25 | Performed by: OTOLARYNGOLOGY

## 2024-10-18 DIAGNOSIS — C61 PROSTATE CANCER METASTATIC TO INTRAPELVIC LYMPH NODE: Primary | ICD-10-CM

## 2024-10-18 DIAGNOSIS — C77.5 PROSTATE CANCER METASTATIC TO INTRAPELVIC LYMPH NODE: Primary | ICD-10-CM

## 2024-10-18 RX ORDER — PREDNISONE 5 MG/1
5 TABLET ORAL DAILY
Qty: 5 TABLET | Refills: 0 | Status: SHIPPED | OUTPATIENT
Start: 2024-10-18

## 2024-10-18 NOTE — PROGRESS NOTES
Chief complaint:  Lesion noted on intubation      HPI   58 y.o. male presents for evaluation of a lesion of the head neck noted up during recent intubation for surgery.  No complaints.  No pain.  No dysphagia.  He does smoke.    Review of Systems   Constitutional: Negative for fatigue and unexpected weight change.   HENT: Per HPI.  Eyes: Negative for visual disturbance.   Respiratory: Negative for shortness of breath, hemoptysis   Cardiovascular: Negative for chest pain and palpitations.   Musculoskeletal: Negative for decreased ROM, back pain.   Skin: Negative for rash, sunburn, itching.   Neurological: Negative for dizziness and seizures.   Hematological: Negative for adenopathy. Does not bruise/bleed easily.   Endocrine: Negative for rapid weight loss/weight gain, heat/cold intolerance.     Past Medical History   Patient Active Problem List   Diagnosis    Chest pain    Hypertension    Type 2 diabetes mellitus with diabetic polyneuropathy, without long-term current use of insulin    GERD (gastroesophageal reflux disease)    Mixed hyperlipidemia    Tobacco abuse    Gastroparesis due to DM    Cannabis abuse    Elevated lipase    Diabetic peripheral neuropathy    Ganglion of right wrist    Prostate cancer metastatic to intrapelvic lymph node    Mass of both adrenal glands    Primary erectile dysfunction    Sleep apnea    Vitamin D deficiency    Depression    Osteopenia    Dehydration    Chronic kidney disease (CKD) stage G3a/A1, moderately decreased glomerular filtration rate (GFR) between 45-59 mL/min/1.73 square meter and albuminuria creatinine ratio less than 30 mg/g    Aortic atherosclerosis    Encounter for monitoring androgen deprivation therapy    Ileus    Vallecular cyst           Past Surgical History   Past Surgical History:   Procedure Laterality Date    ESOPHAGOGASTRODUODENOSCOPY N/A 12/5/2022    Procedure: EGD (ESOPHAGOGASTRODUODENOSCOPY);  Surgeon: Beronica Valera MD;  Location: [facility];  Service:  Endoscopy;  Laterality: N/A;    LAPAROSCOPIC APPENDECTOMY N/A 7/5/2024    Procedure: APPENDECTOMY, LAPAROSCOPIC;  Surgeon: Salazar Scott MD;  Location: [facility] OR;  Service: General;  Laterality: N/A;    PROSTATECTOMY      WASHOUT  7/5/2024    Procedure: Abscess WASHOUT;  Surgeon: Salazar Scott MD;  Location: [facility] OR;  Service: General;;  abscess washout         Family History   Family History   Problem Relation Name Age of Onset    Hypertension Mother      Dementia Mother      Heart disease Mother      Cancer Father age 55     Prostate cancer Father age 55     Hypertension Brother      Pancreatic cancer Neg Hx      Breast cancer Neg Hx             Social History   .  Social History     Socioeconomic History    Marital status: Significant Other   Tobacco Use    Smoking status: Every Day     Types: Cigars    Smokeless tobacco: Never    Tobacco comments:     2-3 cigars/day   Substance and Sexual Activity    Alcohol use: Yes     Comment: Occaisionally throughout the week    Drug use: Yes     Types: Marijuana     Comment: Remote crack cocaine use. No IVDU. Daily marijuana use.    Sexual activity: Yes     Partners: Female   Social History Narrative    Lives on the [facility] accompanied by partner Gretel. Has adult sone and daughter in [place] and [place]. Works as a cook.      Social Drivers of Health     Financial Resource Strain: Low Risk  (7/8/2024)    Overall Financial Resource Strain (CARDIA)     Difficulty of Paying Living Expenses: Not very hard   Food Insecurity: No Food Insecurity (7/8/2024)    Hunger Vital Sign     Worried About Running Out of Food in the Last Year: Never true     Ran Out of Food in the Last Year: Never true   Transportation Needs: No Transportation Needs (7/8/2024)    TRANSPORTATION NEEDS     Transportation : No   Physical Activity: Patient Unable To Answer (7/8/2024)    Exercise Vital Sign     Days of Exercise per Week: Patient unable to answer     Minutes of Exercise per Session:  Patient unable to answer   Stress: Patient Unable To Answer (7/8/2024)    [employer] - Occupational Stress Questionnaire     Feeling of Stress : Patient unable to answer   Housing Stability: Low Risk  (7/8/2024)    Housing Stability Vital Sign     Unable to Pay for Housing in the Last Year: No     Homeless in the Last Year: No         Allergies   Review of patient's allergies indicates:   Allergen Reactions    Lisinopril Shortness Of Breath           Physical Exam     Vitals:    10/15/24 1308   BP: 136/82         Body mass index is 30.3 kg/m².      General: AOx3, NAD   Respiratory:  Symmetric chest rise, normal effort  Nose: No gross nasal septal deviation. Inferior Turbinates WNL bilaterally. No septal perforation. No masses/lesions.   Oral Cavity:  Oral Tongue mobile, no lesions noted. Hard Palate WNL. No buccal or FOM lesions.  Oropharynx:  No masses/lesions of the posterior pharyngeal wall. Tonsillar fossa without lesions. Soft palate without masses. Midline uvula.   Neck: No scars.  No cervical lymphadenopathy, thyromegaly or thyroid nodules.  Normal range of motion.    Face: House Brackmann I bilaterally.     Flex Naso Vickie Hypo Procedures #2    Procedure:  Diagnostic flexible nasopharyngoscopy, laryngoscopy and hypopharyngoscopy:    Routine preparation with local atomizer with 1% neosynephrine/pontocaine with customary flexible endoscope.    Nasopharynx:  No lesions.   Mucosa:  No lesions.   Adenoids:  Present.  Posterior Choanae:  Patent.  Eustachian Tubes:  Patent.  Posterior pharynx:  No lesions.  Larynx/hypopharynx:   Epiglottis:  No lesions, without edema.   AE Folds:  No lesions.   Vocal cords:  No polyps, nodules, ulcers or lesions.   Mobility:  Equal and normal bilateral.   Hypopharynx:  No lesions.   Piriform sinus:  No pooling, no lesions.   Post Cricoid:  No erythema, no edema.    Additional Findings:  Benign-appearing vallecular cyst abutting the right lingual surface  of the epiglottis.    Assessment/Plan  Problem List Items Addressed This Visit          ENT    Vallecular cyst - Primary     Benign-appearing vallecular cyst.  Reassured.  Return in 3 months

## 2024-10-25 ENCOUNTER — OFFICE VISIT (OUTPATIENT)
Dept: UROLOGY | Facility: CLINIC | Age: 59
End: 2024-10-25
Payer: MEDICAID

## 2024-10-25 VITALS
HEIGHT: 71 IN | WEIGHT: 216.06 LBS | SYSTOLIC BLOOD PRESSURE: 124 MMHG | HEART RATE: 92 BPM | DIASTOLIC BLOOD PRESSURE: 72 MMHG | BODY MASS INDEX: 30.25 KG/M2

## 2024-10-25 DIAGNOSIS — Z87.898 HISTORY OF GROSS HEMATURIA: Primary | ICD-10-CM

## 2024-10-25 DIAGNOSIS — Z92.3 STATUS POST RADIATION THERAPY: ICD-10-CM

## 2024-10-25 DIAGNOSIS — Z85.46 HISTORY OF PROSTATE CANCER: ICD-10-CM

## 2024-10-25 LAB
BILIRUB UR QL STRIP: NEGATIVE
CLARITY UR REFRACT.AUTO: CLEAR
COLOR UR AUTO: YELLOW
GLUCOSE UR QL STRIP: NEGATIVE
HGB UR QL STRIP: NEGATIVE
KETONES UR QL STRIP: NEGATIVE
LEUKOCYTE ESTERASE UR QL STRIP: NEGATIVE
NITRITE UR QL STRIP: NEGATIVE
PH UR STRIP: 6 [PH] (ref 5–8)
PROT UR QL STRIP: NEGATIVE
SP GR UR STRIP: 1.01 (ref 1–1.03)
URN SPEC COLLECT METH UR: NORMAL

## 2024-10-25 PROCEDURE — 99214 OFFICE O/P EST MOD 30 MIN: CPT | Mod: PBBFAC,PO | Performed by: UROLOGY

## 2024-10-25 PROCEDURE — 81003 URINALYSIS AUTO W/O SCOPE: CPT | Performed by: UROLOGY

## 2024-10-25 PROCEDURE — 87086 URINE CULTURE/COLONY COUNT: CPT | Performed by: UROLOGY

## 2024-10-25 PROCEDURE — 88120 CYTP URNE 3-5 PROBES EA SPEC: CPT | Performed by: UROLOGY

## 2024-10-25 PROCEDURE — 99999 PR PBB SHADOW E&M-EST. PATIENT-LVL IV: CPT | Mod: PBBFAC,,, | Performed by: UROLOGY

## 2024-10-25 NOTE — PROGRESS NOTES
Subjective:      Patient ID: Mauro Benitez is a 58 y.o. male.    Chief Complaint: prostate cancer follow up    Mr. Wade is 58-year-old gentleman with a history of radical robotic assisted prostatectomy in 2015.  Patient was found have recurrent follow up requiring radiation therapy and hormonal ablation.  The patient has been followed by Radiation Oncology, Hematology Oncology and myself.  He has been on androgen deprivation therapy.  His dose of Trelstar was on June 19, 2024.  Patient is here for his quarterly follow-up and will be seen every 3 months with a PSA over the next year and a half and then every 6 months thereafter.  The patient's most recent PSA was less than 0.01.  Patient had gross hematuria 5 days ago.  Will evaluate urine for cytology and check urinalysis and urine culture.  If normal will need cystoscopy in the office.  This may be secondary to radiation cystitis.  Patient is a cigar smoker will send urine FISH test.    Follow-up  Chronicity: History of prostate cancer with recent gross hematuria. The current episode started in the past 7 days. The problem occurs rarely. The problem has been resolved. Pertinent negatives include no abdominal pain, anorexia, arthralgias, change in bowel habit, chest pain, chills, congestion, coughing, diaphoresis, fatigue, fever, headaches, joint swelling, myalgias, nausea, neck pain, numbness, rash, sore throat, swollen glands, urinary symptoms, vertigo, visual change, vomiting or weakness. Nothing aggravates the symptoms. Treatments tried: History of robotic radical prostatectomy with postop radiation and hormonal ablation. The treatment provided significant relief.     Review of Systems   Constitutional:  Negative for activity change, appetite change, chills, diaphoresis, fatigue, fever and unexpected weight change.   HENT:  Negative for congestion, hearing loss, sinus pressure, sore throat and trouble swallowing.    Eyes:  Negative for photophobia, pain,  discharge and visual disturbance.   Respiratory:  Negative for apnea, cough and shortness of breath.    Cardiovascular:  Negative for chest pain, palpitations and leg swelling.   Gastrointestinal:  Negative for abdominal distention, abdominal pain, anal bleeding, anorexia, blood in stool, change in bowel habit, constipation, diarrhea, nausea, rectal pain and vomiting.   Endocrine: Negative for cold intolerance, heat intolerance, polydipsia, polyphagia and polyuria.   Genitourinary:  Negative for decreased urine volume, difficulty urinating, dysuria, enuresis, flank pain, frequency, genital sores, hematuria, penile discharge, penile pain, penile swelling, scrotal swelling, testicular pain and urgency.   Musculoskeletal:  Negative for arthralgias, back pain, joint swelling, myalgias and neck pain.   Skin:  Negative for color change, pallor, rash and wound.   Allergic/Immunologic: Negative for environmental allergies, food allergies and immunocompromised state.   Neurological:  Negative for dizziness, vertigo, seizures, weakness, numbness and headaches.   Hematological:  Negative for adenopathy. Does not bruise/bleed easily.   Psychiatric/Behavioral: Negative.        Objective:     Physical Exam  Vitals and nursing note reviewed.   Constitutional:       General: He is not in acute distress.     Appearance: Normal appearance. He is well-developed. He is not ill-appearing, toxic-appearing or diaphoretic.   HENT:      Head: Normocephalic and atraumatic.      Right Ear: External ear normal. There is no impacted cerumen.      Left Ear: External ear normal. There is no impacted cerumen.      Nose: Nose normal. No congestion or rhinorrhea.      Mouth/Throat:      Mouth: Mucous membranes are moist.      Pharynx: Oropharynx is clear. No oropharyngeal exudate or posterior oropharyngeal erythema.   Eyes:      General: No scleral icterus.        Right eye: No discharge.         Left eye: No discharge.      Extraocular Movements:  Extraocular movements intact.      Conjunctiva/sclera: Conjunctivae normal.      Pupils: Pupils are equal, round, and reactive to light.   Cardiovascular:      Rate and Rhythm: Normal rate and regular rhythm.      Heart sounds: Normal heart sounds.   Pulmonary:      Effort: Pulmonary effort is normal.   Abdominal:      General: Bowel sounds are normal. There is no distension.      Palpations: Abdomen is soft. There is no mass.      Tenderness: There is no abdominal tenderness. There is no right CVA tenderness, left CVA tenderness, guarding or rebound.      Hernia: No hernia is present.   Genitourinary:     Penis: Normal.       Testes: Normal.      Comments: Patient with no CVA tenderness, normal penis and scrotum.  Bladder nontender.  Musculoskeletal:         General: Normal range of motion.      Cervical back: Normal range of motion and neck supple.   Skin:     General: Skin is warm and dry.      Capillary Refill: Capillary refill takes less than 2 seconds.   Neurological:      General: No focal deficit present.      Mental Status: He is alert and oriented to person, place, and time.      Deep Tendon Reflexes: Reflexes are normal and symmetric.   Psychiatric:         Mood and Affect: Mood normal.         Behavior: Behavior normal.         Thought Content: Thought content normal.         Judgment: Judgment normal.        Assessment:      1. History of gross hematuria    2. History of prostate cancer    3. Status post radiation therapy      Plan:     Patient Instructions   Schedule patient in office in the next week to 2 weeks for cystoscopy  Send urinalysis for cytology and FISH testing.

## 2024-10-25 NOTE — PATIENT INSTRUCTIONS
Schedule patient in office in the next week to 2 weeks for cystoscopy  Send urinalysis for cytology and FISH testing.

## 2024-10-26 LAB — BACTERIA UR CULT: NORMAL

## 2024-11-01 ENCOUNTER — OFFICE VISIT (OUTPATIENT)
Dept: UROLOGY | Facility: CLINIC | Age: 59
End: 2024-11-01
Payer: MEDICAID

## 2024-11-01 VITALS
HEIGHT: 71 IN | DIASTOLIC BLOOD PRESSURE: 72 MMHG | HEART RATE: 92 BPM | BODY MASS INDEX: 30.25 KG/M2 | WEIGHT: 216.06 LBS | SYSTOLIC BLOOD PRESSURE: 124 MMHG

## 2024-11-01 DIAGNOSIS — Z87.898 HISTORY OF GROSS HEMATURIA: Primary | ICD-10-CM

## 2024-11-01 DIAGNOSIS — Z80.42 FAMILY HISTORY OF PROSTATE CANCER IN FATHER: ICD-10-CM

## 2024-11-01 DIAGNOSIS — C77.5 PROSTATE CANCER METASTATIC TO INTRAPELVIC LYMPH NODE: ICD-10-CM

## 2024-11-01 DIAGNOSIS — Z85.46 HISTORY OF PROSTATE CANCER: ICD-10-CM

## 2024-11-01 DIAGNOSIS — C61 PROSTATE CANCER METASTATIC TO INTRAPELVIC LYMPH NODE: ICD-10-CM

## 2024-11-01 PROCEDURE — 99999 PR PBB SHADOW E&M-EST. PATIENT-LVL IV: CPT | Mod: PBBFAC,,, | Performed by: UROLOGY

## 2024-11-01 PROCEDURE — 99214 OFFICE O/P EST MOD 30 MIN: CPT | Mod: PBBFAC,PO | Performed by: UROLOGY

## 2024-12-05 ENCOUNTER — TELEPHONE (OUTPATIENT)
Dept: HEMATOLOGY/ONCOLOGY | Facility: CLINIC | Age: 59
End: 2024-12-05
Payer: MEDICAID

## 2024-12-11 ENCOUNTER — TELEPHONE (OUTPATIENT)
Dept: HEMATOLOGY/ONCOLOGY | Facility: CLINIC | Age: 59
End: 2024-12-11
Payer: MEDICAID

## 2024-12-11 DIAGNOSIS — C61 PROSTATE CANCER METASTATIC TO INTRAPELVIC LYMPH NODE: Primary | ICD-10-CM

## 2024-12-11 DIAGNOSIS — Z92.3 STATUS POST RADIATION THERAPY: ICD-10-CM

## 2024-12-11 DIAGNOSIS — N18.31 CHRONIC KIDNEY DISEASE (CKD) STAGE G3A/A1, MODERATELY DECREASED GLOMERULAR FILTRATION RATE (GFR) BETWEEN 45-59 ML/MIN/1.73 SQUARE METER AND ALBUMINURIA CREATININE RATIO LESS THAN 30 MG/G: ICD-10-CM

## 2024-12-11 DIAGNOSIS — R74.8 ELEVATED LIPASE: ICD-10-CM

## 2024-12-11 DIAGNOSIS — C77.5 PROSTATE CANCER METASTATIC TO INTRAPELVIC LYMPH NODE: Primary | ICD-10-CM

## 2024-12-11 DIAGNOSIS — E86.0 DEHYDRATION: ICD-10-CM

## 2024-12-11 DIAGNOSIS — C61 PROSTATE CANCER: ICD-10-CM

## 2024-12-11 NOTE — TELEPHONE ENCOUNTER
NA LVM with pt's significant other to confirm appt for 12/12/24 and check if pt was getting labs done today.

## 2024-12-19 ENCOUNTER — LAB VISIT (OUTPATIENT)
Dept: LAB | Facility: HOSPITAL | Age: 59
End: 2024-12-19
Payer: MEDICAID

## 2024-12-19 DIAGNOSIS — C77.5 PROSTATE CANCER METASTATIC TO INTRAPELVIC LYMPH NODE: ICD-10-CM

## 2024-12-19 DIAGNOSIS — C61 PROSTATE CANCER METASTATIC TO INTRAPELVIC LYMPH NODE: ICD-10-CM

## 2024-12-19 DIAGNOSIS — R74.8 ELEVATED LIPASE: ICD-10-CM

## 2024-12-19 DIAGNOSIS — C61 PROSTATE CANCER: ICD-10-CM

## 2024-12-19 DIAGNOSIS — N18.31 CHRONIC KIDNEY DISEASE (CKD) STAGE G3A/A1, MODERATELY DECREASED GLOMERULAR FILTRATION RATE (GFR) BETWEEN 45-59 ML/MIN/1.73 SQUARE METER AND ALBUMINURIA CREATININE RATIO LESS THAN 30 MG/G: ICD-10-CM

## 2024-12-19 DIAGNOSIS — Z92.3 STATUS POST RADIATION THERAPY: ICD-10-CM

## 2024-12-19 LAB
ALBUMIN SERPL BCP-MCNC: 3.7 G/DL (ref 3.5–5.2)
ALP SERPL-CCNC: 92 U/L (ref 40–150)
ALT SERPL W/O P-5'-P-CCNC: 15 U/L (ref 10–44)
ANION GAP SERPL CALC-SCNC: 8 MMOL/L (ref 8–16)
AST SERPL-CCNC: 12 U/L (ref 10–40)
BILIRUB SERPL-MCNC: 0.6 MG/DL (ref 0.1–1)
BUN SERPL-MCNC: 19 MG/DL (ref 6–20)
CALCIUM SERPL-MCNC: 9.8 MG/DL (ref 8.7–10.5)
CHLORIDE SERPL-SCNC: 110 MMOL/L (ref 95–110)
CO2 SERPL-SCNC: 19 MMOL/L (ref 23–29)
COMPLEXED PSA SERPL-MCNC: <0.01 NG/ML (ref 0–4)
CREAT SERPL-MCNC: 1.1 MG/DL (ref 0.5–1.4)
ERYTHROCYTE [DISTWIDTH] IN BLOOD BY AUTOMATED COUNT: 15.1 % (ref 11.5–14.5)
EST. GFR  (NO RACE VARIABLE): >60 ML/MIN/1.73 M^2
GLUCOSE SERPL-MCNC: 195 MG/DL (ref 70–110)
HCT VFR BLD AUTO: 37.7 % (ref 40–54)
HGB BLD-MCNC: 12 G/DL (ref 14–18)
IMM GRANULOCYTES # BLD AUTO: 0.01 K/UL (ref 0–0.04)
MCH RBC QN AUTO: 30.7 PG (ref 27–31)
MCHC RBC AUTO-ENTMCNC: 31.8 G/DL (ref 32–36)
MCV RBC AUTO: 96 FL (ref 82–98)
NEUTROPHILS # BLD AUTO: 4.4 K/UL (ref 1.8–7.7)
PLATELET # BLD AUTO: 344 K/UL (ref 150–450)
PMV BLD AUTO: 9.8 FL (ref 9.2–12.9)
POTASSIUM SERPL-SCNC: 5.4 MMOL/L (ref 3.5–5.1)
PROT SERPL-MCNC: 7.5 G/DL (ref 6–8.4)
RBC # BLD AUTO: 3.91 M/UL (ref 4.6–6.2)
SODIUM SERPL-SCNC: 137 MMOL/L (ref 136–145)
WBC # BLD AUTO: 6.17 K/UL (ref 3.9–12.7)

## 2024-12-19 PROCEDURE — 80053 COMPREHEN METABOLIC PANEL: CPT | Performed by: HOSPITALIST

## 2024-12-19 PROCEDURE — 85027 COMPLETE CBC AUTOMATED: CPT | Performed by: HOSPITALIST

## 2024-12-19 PROCEDURE — 84153 ASSAY OF PSA TOTAL: CPT | Performed by: HOSPITALIST

## 2024-12-19 PROCEDURE — 36415 COLL VENOUS BLD VENIPUNCTURE: CPT | Performed by: HOSPITALIST

## 2025-01-13 ENCOUNTER — OFFICE VISIT (OUTPATIENT)
Dept: OTOLARYNGOLOGY | Facility: CLINIC | Age: 60
End: 2025-01-13
Payer: MEDICAID

## 2025-01-13 DIAGNOSIS — J38.7 VALLECULAR CYST: Primary | ICD-10-CM

## 2025-01-13 PROCEDURE — 99999 PR PBB SHADOW E&M-EST. PATIENT-LVL III: CPT | Mod: PBBFAC,,, | Performed by: OTOLARYNGOLOGY

## 2025-01-13 PROCEDURE — 99499 UNLISTED E&M SERVICE: CPT | Mod: S$PBB,,, | Performed by: OTOLARYNGOLOGY

## 2025-01-13 PROCEDURE — 99213 OFFICE O/P EST LOW 20 MIN: CPT | Mod: PBBFAC | Performed by: OTOLARYNGOLOGY

## 2025-01-14 PROBLEM — E83.9 CHRONIC KIDNEY DISEASE-MINERAL AND BONE DISORDER (CKD-MBD): Status: ACTIVE | Noted: 2025-01-14

## 2025-01-14 PROBLEM — E87.20 ACIDOSIS: Status: ACTIVE | Noted: 2025-01-14

## 2025-01-14 PROBLEM — E83.42 HYPOMAGNESEMIA: Status: ACTIVE | Noted: 2025-01-14

## 2025-01-14 PROBLEM — N18.9 ANEMIA OF CHRONIC RENAL FAILURE: Status: ACTIVE | Noted: 2025-01-14

## 2025-01-14 PROBLEM — N18.9 CHRONIC KIDNEY DISEASE-MINERAL AND BONE DISORDER (CKD-MBD): Status: ACTIVE | Noted: 2025-01-14

## 2025-01-14 PROBLEM — N25.81 SECONDARY HYPERPARATHYROIDISM OF RENAL ORIGIN: Status: ACTIVE | Noted: 2025-01-14

## 2025-01-14 PROBLEM — R80.9 PROTEINURIA: Status: ACTIVE | Noted: 2025-01-14

## 2025-01-14 PROBLEM — D63.1 ANEMIA OF CHRONIC RENAL FAILURE: Status: ACTIVE | Noted: 2025-01-14

## 2025-01-14 PROBLEM — D50.9 IRON DEFICIENCY ANEMIA: Status: ACTIVE | Noted: 2025-01-14

## 2025-01-14 PROBLEM — M89.9 CHRONIC KIDNEY DISEASE-MINERAL AND BONE DISORDER (CKD-MBD): Status: ACTIVE | Noted: 2025-01-14

## 2025-02-11 ENCOUNTER — PROCEDURE VISIT (OUTPATIENT)
Dept: UROLOGY | Facility: CLINIC | Age: 60
End: 2025-02-11
Payer: MEDICAID

## 2025-02-11 VITALS
DIASTOLIC BLOOD PRESSURE: 77 MMHG | BODY MASS INDEX: 29.55 KG/M2 | HEART RATE: 74 BPM | WEIGHT: 211.88 LBS | SYSTOLIC BLOOD PRESSURE: 120 MMHG

## 2025-02-11 DIAGNOSIS — R31.0 GROSS HEMATURIA: Primary | ICD-10-CM

## 2025-02-11 DIAGNOSIS — C61 PROSTATE CANCER METASTATIC TO INTRAPELVIC LYMPH NODE: ICD-10-CM

## 2025-02-11 DIAGNOSIS — Z80.42 FAMILY HX OF PROSTATE CANCER: ICD-10-CM

## 2025-02-11 DIAGNOSIS — C77.5 PROSTATE CANCER METASTATIC TO INTRAPELVIC LYMPH NODE: ICD-10-CM

## 2025-02-11 PROCEDURE — 52000 CYSTOURETHROSCOPY: CPT | Mod: PBBFAC,PO | Performed by: UROLOGY

## 2025-02-11 PROCEDURE — 88112 CYTOPATH CELL ENHANCE TECH: CPT | Performed by: PATHOLOGY

## 2025-02-11 PROCEDURE — 88120 CYTP URNE 3-5 PROBES EA SPEC: CPT | Performed by: UROLOGY

## 2025-02-11 RX ORDER — CIPROFLOXACIN 500 MG/1
500 TABLET ORAL 2 TIMES DAILY
Qty: 10 TABLET | Refills: 0 | Status: SHIPPED | OUTPATIENT
Start: 2025-02-11 | End: 2025-02-16

## 2025-02-11 NOTE — PATIENT INSTRUCTIONS
Patient to obtain PSA in 1 month and notify patient of results.  Patient to obtain urine FISH test and cytology today.  Notify patient of results.  Patient to follow up in 4 months with repeat PSA.

## 2025-02-11 NOTE — PROCEDURES
Cystoscopy    Date/Time: 2/11/2025 9:30 AM    Performed by: Edward Edmond MD  Authorized by: Edward Edmond MD    Consent Done?:  Yes (Written)  Timeout: prior to procedure the correct patient, procedure, and site was verified    Prep: patient was prepped and draped in usual sterile fashion    Local anesthesia used?: Yes    Anesthesia:  Intraurethral instillation  Local anesthetic:  Lidocaine 2% topical gel  Anesthetic total (ml):  10  Indications: hematuria    Position:  Dorsal lithotomy  Anesthesia:  Intraurethral instillation  Patient sedated?: No    Preparation: Patient was prepped and draped in usual sterile fashion    Scope type:  Flexible cystoscopeNoNo  Stent inserted: No    Stent removed: No    External exam normal: Yes    Digital exam performed: No    Urethra normal: Yes    Prostate normal: Surgically absent.    Bladder neck normal: No (Few cystic lesions at bladder neck does not appear transitional cell in nature but like cystitis cystica.  Patient also with dilated blood vessels at bladder neck with a possible hemangioma in the bladder.)    Bladder normal: No (Dilated varicosities near bladder neck with small hemangioma on right lateral wall)

## 2025-02-14 LAB
FINAL PATHOLOGIC DIAGNOSIS: ABNORMAL
Lab: ABNORMAL

## 2025-03-21 ENCOUNTER — TELEPHONE (OUTPATIENT)
Dept: OTOLARYNGOLOGY | Facility: CLINIC | Age: 60
End: 2025-03-21
Payer: MEDICAID

## 2025-03-21 NOTE — TELEPHONE ENCOUNTER
----- Message from Roberto Dickens MD sent at 1/13/2025 11:17 AM CST -----  Please reschedule him once he has recovered from flu.

## 2025-03-31 ENCOUNTER — OFFICE VISIT (OUTPATIENT)
Dept: OTOLARYNGOLOGY | Facility: CLINIC | Age: 60
End: 2025-03-31
Payer: MEDICAID

## 2025-03-31 VITALS
DIASTOLIC BLOOD PRESSURE: 74 MMHG | SYSTOLIC BLOOD PRESSURE: 126 MMHG | WEIGHT: 197.75 LBS | HEART RATE: 84 BPM | BODY MASS INDEX: 27.58 KG/M2

## 2025-03-31 DIAGNOSIS — J38.7 VALLECULAR CYST: Primary | ICD-10-CM

## 2025-03-31 PROCEDURE — 99214 OFFICE O/P EST MOD 30 MIN: CPT | Mod: PBBFAC | Performed by: OTOLARYNGOLOGY

## 2025-03-31 PROCEDURE — 99213 OFFICE O/P EST LOW 20 MIN: CPT | Mod: 25,S$PBB,, | Performed by: OTOLARYNGOLOGY

## 2025-03-31 PROCEDURE — 3078F DIAST BP <80 MM HG: CPT | Mod: CPTII,,, | Performed by: OTOLARYNGOLOGY

## 2025-03-31 PROCEDURE — 1160F RVW MEDS BY RX/DR IN RCRD: CPT | Mod: CPTII,,, | Performed by: OTOLARYNGOLOGY

## 2025-03-31 PROCEDURE — 31575 DIAGNOSTIC LARYNGOSCOPY: CPT | Mod: PBBFAC | Performed by: OTOLARYNGOLOGY

## 2025-03-31 PROCEDURE — 99999 PR PBB SHADOW E&M-EST. PATIENT-LVL IV: CPT | Mod: PBBFAC,,, | Performed by: OTOLARYNGOLOGY

## 2025-03-31 PROCEDURE — 3008F BODY MASS INDEX DOCD: CPT | Mod: CPTII,,, | Performed by: OTOLARYNGOLOGY

## 2025-03-31 PROCEDURE — 1159F MED LIST DOCD IN RCRD: CPT | Mod: CPTII,,, | Performed by: OTOLARYNGOLOGY

## 2025-03-31 PROCEDURE — 31575 DIAGNOSTIC LARYNGOSCOPY: CPT | Mod: S$PBB,,, | Performed by: OTOLARYNGOLOGY

## 2025-03-31 PROCEDURE — 3074F SYST BP LT 130 MM HG: CPT | Mod: CPTII,,, | Performed by: OTOLARYNGOLOGY

## 2025-03-31 PROCEDURE — 4010F ACE/ARB THERAPY RXD/TAKEN: CPT | Mod: CPTII,,, | Performed by: OTOLARYNGOLOGY

## 2025-03-31 PROCEDURE — 3066F NEPHROPATHY DOC TX: CPT | Mod: CPTII,,, | Performed by: OTOLARYNGOLOGY

## 2025-03-31 NOTE — PROGRESS NOTES
Chief complaint:  Lesion noted on intubation      HPI   59 y.o. male presents for evaluation of a lesion of the head neck noted up during recent intubation for surgery.  No complaints.  No pain.  No dysphagia.  He does smoke.    No complaints since last eval.    Review of Systems   Constitutional: Negative for fatigue and unexpected weight change.   HENT: Per HPI.  Eyes: Negative for visual disturbance.   Respiratory: Negative for shortness of breath, hemoptysis   Cardiovascular: Negative for chest pain and palpitations.   Musculoskeletal: Negative for decreased ROM, back pain.   Skin: Negative for rash, sunburn, itching.   Neurological: Negative for dizziness and seizures.   Hematological: Negative for adenopathy. Does not bruise/bleed easily.   Endocrine: Negative for rapid weight loss/weight gain, heat/cold intolerance.     Past Medical History   Patient Active Problem List   Diagnosis    Chest pain    Hypertension    Type 2 diabetes mellitus with diabetic polyneuropathy, without long-term current use of insulin    GERD (gastroesophageal reflux disease)    Mixed hyperlipidemia    Tobacco abuse    Gastroparesis due to DM    Cannabis abuse    Elevated lipase    Diabetic peripheral neuropathy    Ganglion of right wrist    Prostate cancer metastatic to intrapelvic lymph node    Mass of both adrenal glands    Primary erectile dysfunction    Sleep apnea    Vitamin D deficiency    Depression    Osteopenia    Dehydration    Chronic kidney disease (CKD) stage G3a/A1, moderately decreased glomerular filtration rate (GFR) between 45-59 mL/min/1.73 square meter and albuminuria creatinine ratio less than 30 mg/g    Status post radiation therapy    Aortic atherosclerosis    Encounter for monitoring androgen deprivation therapy    Ileus    Vallecular cyst    History of gross hematuria    History of prostate cancer    Anemia of chronic renal failure    Iron deficiency anemia    Secondary hyperparathyroidism of renal origin     Chronic kidney disease-mineral and bone disorder (CKD-MBD)    Proteinuria    Hypomagnesemia    Acidosis           Past Surgical History   Past Surgical History:   Procedure Laterality Date    ESOPHAGOGASTRODUODENOSCOPY N/A 12/5/2022    Procedure: EGD (ESOPHAGOGASTRODUODENOSCOPY);  Surgeon: Beronica Valera MD;  Location: [facility] ENDO;  Service: Endoscopy;  Laterality: N/A;    LAPAROSCOPIC APPENDECTOMY N/A 7/5/2024    Procedure: APPENDECTOMY, LAPAROSCOPIC;  Surgeon: Salazar Scott MD;  Location: [facility] OR;  Service: General;  Laterality: N/A;    PROSTATECTOMY      WASHOUT  7/5/2024    Procedure: Abscess WASHOUT;  Surgeon: Salazar Scott MD;  Location: [facility] OR;  Service: General;;  abscess washout         Family History   Family History   Problem Relation Name Age of Onset    Hypertension Mother      Dementia Mother      Heart disease Mother      Cancer Father age 55     Prostate cancer Father age 55     Hypertension Brother      Pancreatic cancer Neg Hx      Breast cancer Neg Hx             Social History   .  Social History     Socioeconomic History    Marital status: Significant Other   Tobacco Use    Smoking status: Every Day     Types: Cigars    Smokeless tobacco: Never    Tobacco comments:     2-3 cigars/day   Substance and Sexual Activity    Alcohol use: Yes     Comment: Occaisionally throughout the week    Drug use: Yes     Types: Marijuana     Comment: Remote crack cocaine use. No IVDU. Daily marijuana use.    Sexual activity: Yes     Partners: Female   Social History Narrative    Lives on the [facility] accompanied by partner Gretel. Has adult sone and daughter in [place] and [place]. Works as a cook.      Social Drivers of Health     Financial Resource Strain: Low Risk  (7/8/2024)    Overall Financial Resource Strain (CARDIA)     Difficulty of Paying Living Expenses: Not very hard   Food Insecurity: No Food Insecurity (7/8/2024)    Hunger Vital Sign     Worried About Running Out of Food in the Last  Year: Never true     Ran Out of Food in the Last Year: Never true   Transportation Needs: No Transportation Needs (7/8/2024)    TRANSPORTATION NEEDS     Transportation : No   Physical Activity: Patient Unable To Answer (7/8/2024)    Exercise Vital Sign     Days of Exercise per Week: Patient unable to answer     Minutes of Exercise per Session: Patient unable to answer   Stress: Patient Unable To Answer (7/8/2024)    [employer] - Occupational Stress Questionnaire     Feeling of Stress : Patient unable to answer   Housing Stability: Unknown (7/8/2024)    Housing Stability Vital Sign     Unable to Pay for Housing in the Last Year: No     Homeless in the Last Year: No         Allergies   Review of patient's allergies indicates:   Allergen Reactions    Lisinopril Shortness Of Breath           Physical Exam     Vitals:    03/31/25 1031   BP: 126/74   Pulse: 84         Body mass index is 27.58 kg/m².      General: AOx3, NAD   Respiratory:  Symmetric chest rise, normal effort  Nose: No gross nasal septal deviation. Inferior Turbinates WNL bilaterally. No septal perforation. No masses/lesions.   Oral Cavity:  Oral Tongue mobile, no lesions noted. Hard Palate WNL. No buccal or FOM lesions.  Oropharynx:  No masses/lesions of the posterior pharyngeal wall. Tonsillar fossa without lesions. Soft palate without masses. Midline uvula.   Neck: No scars.  No cervical lymphadenopathy, thyromegaly or thyroid nodules.  Normal range of motion.    Face: House Brackmann I bilaterally.     Flex Naso Vickie Hypo Procedures #2    Procedure:  Diagnostic flexible nasopharyngoscopy, laryngoscopy and hypopharyngoscopy:    Routine preparation with local atomizer with 1% neosynephrine/pontocaine with customary flexible endoscope.    Nasopharynx:  No lesions.   Mucosa:  No lesions.   Adenoids:  Present.  Posterior Choanae:  Patent.  Eustachian Tubes:  Patent.  Posterior pharynx:  No  lesions.  Larynx/hypopharynx:   Epiglottis:  No lesions, without edema.   AE Folds:  No lesions.   Vocal cords:  No polyps, nodules, ulcers or lesions.   Mobility:  Equal and normal bilateral.   Hypopharynx:  No lesions.   Piriform sinus:  No pooling, no lesions.   Post Cricoid:  No erythema, no edema.    Additional Findings:  Benign-appearing vallecular cyst abutting the right lingual surface of the epiglottis.    Assessment/Plan  Problem List Items Addressed This Visit          ENT    Vallecular cyst - Primary    Stable. RTC PRN.

## 2025-08-08 DIAGNOSIS — I10 HYPERTENSION, UNSPECIFIED TYPE: ICD-10-CM

## 2025-08-08 RX ORDER — CARVEDILOL 25 MG/1
25 TABLET ORAL 2 TIMES DAILY
Qty: 180 TABLET | Refills: 2 | Status: SHIPPED | OUTPATIENT
Start: 2025-08-08

## (undated) DEVICE — SHEARS HARMONIC 5CM 36CM

## (undated) DEVICE — TIP SUCTION IRRIGATOR

## (undated) DEVICE — DRAIN FLAT JP 10X4MM P

## (undated) DEVICE — SUT MCRYL PLUS 4-0 PS2 27IN

## (undated) DEVICE — CONTAINER MULTIPURPOSE/SPECIME

## (undated) DEVICE — TROCAR ENDOPATH XCEL 5X75MM

## (undated) DEVICE — NDL HYPO REG 25G X 1 1/2

## (undated) DEVICE — TROCAR KII FIOS 11MM X 100MM

## (undated) DEVICE — TRAY FOLEY 16FR INFECTION CONT

## (undated) DEVICE — Device

## (undated) DEVICE — MANIFOLD 4 PORT

## (undated) DEVICE — SYS CLSR WND ENDOSCP XL

## (undated) DEVICE — GLOVE SIGNATURE ESSNTL LTX 7.5

## (undated) DEVICE — SUT ETHIBOND 0 CR/CT-2 8-18

## (undated) DEVICE — SUT ETHICON 3-0 BLK MONO PS

## (undated) DEVICE — ADHESIVE DERMABOND ADVANCED

## (undated) DEVICE — EVACUATOR WOUND BULB 100CC

## (undated) DEVICE — BAG TISS RETRV MONARCH 10MM

## (undated) DEVICE — TROCAR ENDOPATH XCEL 5X100MM

## (undated) DEVICE — ELECTRODE REM PLYHSV RETURN 9

## (undated) DEVICE — IRRIGATOR ENDOSCOPY DISP.